# Patient Record
Sex: FEMALE | Race: WHITE | NOT HISPANIC OR LATINO | Employment: OTHER | URBAN - METROPOLITAN AREA
[De-identification: names, ages, dates, MRNs, and addresses within clinical notes are randomized per-mention and may not be internally consistent; named-entity substitution may affect disease eponyms.]

---

## 2017-01-23 ENCOUNTER — HOSPITAL ENCOUNTER (OUTPATIENT)
Dept: MAMMOGRAPHY | Facility: HOSPITAL | Age: 42
Discharge: HOME | End: 2017-01-23
Attending: OBSTETRICS & GYNECOLOGY | Admitting: OBSTETRICS & GYNECOLOGY

## 2017-01-23 DIAGNOSIS — Z12.31 VISIT FOR SCREENING MAMMOGRAM: ICD-10-CM

## 2017-01-23 PROCEDURE — 77063 BREAST TOMOSYNTHESIS BI: CPT

## 2017-01-23 PROCEDURE — 77063 BREAST TOMOSYNTHESIS BI: CPT | Performed by: RADIOLOGY

## 2017-01-23 PROCEDURE — G0202 SCR MAMMO BI INCL CAD: HCPCS

## 2017-01-23 PROCEDURE — 77067 SCR MAMMO BI INCL CAD: CPT | Performed by: RADIOLOGY

## 2017-10-09 ENCOUNTER — OFFICE VISIT (OUTPATIENT)
Dept: INTERNAL MEDICINE | Facility: CLINIC | Age: 42
End: 2017-10-09

## 2017-10-09 VITALS
DIASTOLIC BLOOD PRESSURE: 66 MMHG | BODY MASS INDEX: 19.99 KG/M2 | RESPIRATION RATE: 16 BRPM | HEART RATE: 72 BPM | OXYGEN SATURATION: 97 % | SYSTOLIC BLOOD PRESSURE: 94 MMHG | HEIGHT: 65 IN | WEIGHT: 120 LBS

## 2017-10-09 DIAGNOSIS — M54.41 CHRONIC RIGHT-SIDED LOW BACK PAIN WITH RIGHT-SIDED SCIATICA: ICD-10-CM

## 2017-10-09 DIAGNOSIS — Z00.00 ANNUAL PHYSICAL EXAM: Primary | ICD-10-CM

## 2017-10-09 DIAGNOSIS — J30.89 CHRONIC NON-SEASONAL ALLERGIC RHINITIS, UNSPECIFIED TRIGGER: ICD-10-CM

## 2017-10-09 DIAGNOSIS — N32.81 OAB (OVERACTIVE BLADDER): ICD-10-CM

## 2017-10-09 DIAGNOSIS — G89.29 CHRONIC RIGHT-SIDED LOW BACK PAIN WITH RIGHT-SIDED SCIATICA: ICD-10-CM

## 2017-10-09 LAB
25(OH)D3 SERPL-MCNC: 11.3 NG/ML
ALBUMIN SERPL-MCNC: 4.6 G/DL (ref 3.2–4.8)
ALBUMIN/GLOB SERPL: 1.8 G/DL (ref 1.5–2.5)
ALP SERPL-CCNC: 47 U/L (ref 25–100)
ALT SERPL W P-5'-P-CCNC: 13 U/L (ref 7–40)
ANION GAP SERPL CALCULATED.3IONS-SCNC: 7 MMOL/L (ref 3–11)
ARTICHOKE IGE QN: 79 MG/DL (ref 0–130)
AST SERPL-CCNC: 15 U/L (ref 0–33)
BILIRUB BLD-MCNC: NEGATIVE MG/DL
BILIRUB SERPL-MCNC: 0.7 MG/DL (ref 0.3–1.2)
BUN BLD-MCNC: 16 MG/DL (ref 9–23)
BUN/CREAT SERPL: 20 (ref 7–25)
CALCIUM SPEC-SCNC: 9.5 MG/DL (ref 8.7–10.4)
CHLORIDE SERPL-SCNC: 108 MMOL/L (ref 99–109)
CHOLEST SERPL-MCNC: 164 MG/DL (ref 0–200)
CLARITY, POC: CLEAR
CO2 SERPL-SCNC: 24 MMOL/L (ref 20–31)
COLOR UR: YELLOW
CREAT BLD-MCNC: 0.8 MG/DL (ref 0.6–1.3)
DEPRECATED RDW RBC AUTO: 39.4 FL (ref 37–54)
ERYTHROCYTE [DISTWIDTH] IN BLOOD BY AUTOMATED COUNT: 12.2 % (ref 11.3–14.5)
GFR SERPL CREATININE-BSD FRML MDRD: 78 ML/MIN/1.73
GLOBULIN UR ELPH-MCNC: 2.6 GM/DL
GLUCOSE BLD-MCNC: 83 MG/DL (ref 70–100)
GLUCOSE UR STRIP-MCNC: NEGATIVE MG/DL
HCT VFR BLD AUTO: 43.1 % (ref 34.5–44)
HDLC SERPL-MCNC: 76 MG/DL (ref 40–60)
HGB BLD-MCNC: 14.5 G/DL (ref 11.5–15.5)
KETONES UR QL: NEGATIVE
LEUKOCYTE EST, POC: ABNORMAL
MCH RBC QN AUTO: 30 PG (ref 27–31)
MCHC RBC AUTO-ENTMCNC: 33.6 G/DL (ref 32–36)
MCV RBC AUTO: 89 FL (ref 80–99)
NITRITE UR-MCNC: NEGATIVE MG/ML
PH UR: 5 [PH] (ref 5–8)
PLATELET # BLD AUTO: 228 10*3/MM3 (ref 150–450)
PMV BLD AUTO: 11.7 FL (ref 6–12)
POTASSIUM BLD-SCNC: 4.9 MMOL/L (ref 3.5–5.5)
PROT SERPL-MCNC: 7.2 G/DL (ref 5.7–8.2)
PROT UR STRIP-MCNC: ABNORMAL MG/DL
RBC # BLD AUTO: 4.84 10*6/MM3 (ref 3.89–5.14)
RBC # UR STRIP: ABNORMAL /UL
SODIUM BLD-SCNC: 139 MMOL/L (ref 132–146)
SP GR UR: 1.02 (ref 1–1.03)
TRIGL SERPL-MCNC: 68 MG/DL (ref 0–150)
TSH SERPL DL<=0.05 MIU/L-ACNC: 1.25 MIU/ML (ref 0.35–5.35)
UROBILINOGEN UR QL: ABNORMAL
WBC NRBC COR # BLD: 6.01 10*3/MM3 (ref 3.5–10.8)

## 2017-10-09 PROCEDURE — 82306 VITAMIN D 25 HYDROXY: CPT | Performed by: INTERNAL MEDICINE

## 2017-10-09 PROCEDURE — 90471 IMMUNIZATION ADMIN: CPT | Performed by: INTERNAL MEDICINE

## 2017-10-09 PROCEDURE — 99213 OFFICE O/P EST LOW 20 MIN: CPT | Performed by: INTERNAL MEDICINE

## 2017-10-09 PROCEDURE — 80053 COMPREHEN METABOLIC PANEL: CPT | Performed by: INTERNAL MEDICINE

## 2017-10-09 PROCEDURE — 85027 COMPLETE CBC AUTOMATED: CPT | Performed by: INTERNAL MEDICINE

## 2017-10-09 PROCEDURE — 81003 URINALYSIS AUTO W/O SCOPE: CPT | Performed by: INTERNAL MEDICINE

## 2017-10-09 PROCEDURE — 80061 LIPID PANEL: CPT | Performed by: INTERNAL MEDICINE

## 2017-10-09 PROCEDURE — 84443 ASSAY THYROID STIM HORMONE: CPT | Performed by: INTERNAL MEDICINE

## 2017-10-09 PROCEDURE — 99396 PREV VISIT EST AGE 40-64: CPT | Performed by: INTERNAL MEDICINE

## 2017-10-09 PROCEDURE — 90686 IIV4 VACC NO PRSV 0.5 ML IM: CPT | Performed by: INTERNAL MEDICINE

## 2017-10-09 RX ORDER — MONTELUKAST SODIUM 10 MG/1
TABLET ORAL
Qty: 30 TABLET | Refills: 11 | Status: SHIPPED | OUTPATIENT
Start: 2017-10-09 | End: 2018-02-19

## 2017-10-09 RX ORDER — CYCLOBENZAPRINE HCL 10 MG
10 TABLET ORAL NIGHTLY PRN
Qty: 30 TABLET | Refills: 5 | Status: SHIPPED | OUTPATIENT
Start: 2017-10-09 | End: 2018-11-08 | Stop reason: SDUPTHER

## 2017-10-09 NOTE — PROGRESS NOTES
Chief Complaint   Patient presents with   • Annual Exam       History of Present Illness  HM, Adult Female: The patient is being seen for a health maintenance evaluation.   Social history:  with Children. Occupation: .  General Health: The patient's health is described as good. She has regular dental visits. She denies vision problems. She denies hearing loss. Immunizations status: up to date.   Lifestyle:. She consumes a diverse and healthy diet. She does not have any weight concerns. She exercises regularly.Swims regularly and has had Yoga sessions at work.She does not use tobacco. She denies alcohol use. She denies drug use.   Reproductive health:. She reports normal menses. S/p Tubal.  Screening: Cancer screening reviewed and current.   Metabolic screening reviewed and current.   Risk screening reviewed and current.       Concerned about her HAs worse over the frontal area.  Neck is continuing to hurt, and mid and lower back pain. Helped by flexeril and ibuprofen.  Has seen [employer]now trying some hemp oil, whgich makes her feel better.  Periods regular, having incontinence issues, and given oxybutinin from   Has been on local honey, allergies have been at bay.  Co- Worker       Review of Systems   Constitutional: Negative.  Negative for chills and fever.   HENT: Negative for ear discharge, ear pain, sinus pressure and sore throat.    Respiratory: Negative for cough, chest tightness and shortness of breath.    Cardiovascular: Negative for chest pain, palpitations and leg swelling.   Gastrointestinal: Negative for diarrhea, nausea and vomiting.   Genitourinary: Positive for urgency.   Musculoskeletal: Positive for back pain, myalgias and neck pain. Negative for arthralgias.   Neurological: Positive for headaches. Negative for dizziness and syncope.   Psychiatric/Behavioral: Negative for confusion and sleep disturbance.       Patient Active Problem List   Diagnosis   • IBS (irritable  "bowel syndrome)   • Vitamin D deficiency       Social History     Social History   • Marital status:      Spouse name: N/A   • Number of children: N/A   • Years of education: N/A     Occupational History   • Not on file.     Social History Main Topics   • Smoking status: Never Smoker   • Smokeless tobacco: Not on file   • Alcohol use Yes      Comment: SOCIAL   • Drug use: Not on file   • Sexual activity: Not on file     Other Topics Concern   • Not on file     Social History Narrative       Current Outpatient Prescriptions on File Prior to Visit   Medication Sig Dispense Refill   • vitamin D (ERGOCALCIFEROL) 62946 UNITS capsule capsule Take 50,000 Units by mouth Every 7 (Seven) Days.       No current facility-administered medications on file prior to visit.        Allergies   Allergen Reactions   • Penicillins        BP 94/66 (BP Location: Right arm, Patient Position: Sitting)  Pulse 72 Comment: regular  Resp 16 Comment: normal  Ht 65\" (165.1 cm)  Wt 120 lb (54.4 kg)  SpO2 97% Comment: RA  BMI 19.97 kg/m2           The following portions of the patient's history were reviewed and updated as appropriate: allergies, current medications, past family history, past medical history, past social history, past surgical history and problem list.    Physical Exam   Constitutional: She is oriented to person, place, and time. She appears well-developed and well-nourished.   HENT:   Head: Normocephalic and atraumatic.   Right Ear: External ear normal.   Left Ear: External ear normal.   Mouth/Throat: No oropharyngeal exudate.   Eyes: Conjunctivae are normal. Pupils are equal, round, and reactive to light.   Neck: Neck supple. No thyromegaly present.   Cardiovascular: Normal rate, regular rhythm and intact distal pulses.  Exam reveals no friction rub.    No murmur heard.  Pulmonary/Chest: Effort normal and breath sounds normal. No respiratory distress. She has no wheezes. She has no rales.   Abdominal: Soft. Bowel " sounds are normal. She exhibits no distension. There is no tenderness. There is no rebound and no guarding.   Musculoskeletal: She exhibits tenderness (over upper aspect of neck.). She exhibits no edema.   Lymphadenopathy:     She has no cervical adenopathy.   Neurological: She is alert and oriented to person, place, and time. She displays normal reflexes. No cranial nerve deficit. Coordination normal.   Skin: Skin is warm and dry.   Psychiatric: She has a normal mood and affect. Judgment normal.   A little anxious/ stressed   Nursing note and vitals reviewed.      Results for orders placed or performed in visit on 10/09/17   CBC (No Diff)   Result Value Ref Range    WBC 6.01 3.50 - 10.80 10*3/mm3    RBC 4.84 3.89 - 5.14 10*6/mm3    Hemoglobin 14.5 11.5 - 15.5 g/dL    Hematocrit 43.1 34.5 - 44.0 %    MCV 89.0 80.0 - 99.0 fL    MCH 30.0 27.0 - 31.0 pg    MCHC 33.6 32.0 - 36.0 g/dL    RDW 12.2 11.3 - 14.5 %    RDW-SD 39.4 37.0 - 54.0 fl    MPV 11.7 6.0 - 12.0 fL    Platelets 228 150 - 450 10*3/mm3   Comprehensive Metabolic Panel   Result Value Ref Range    Glucose 83 70 - 100 mg/dL    BUN 16 9 - 23 mg/dL    Creatinine 0.80 0.60 - 1.30 mg/dL    Sodium 139 132 - 146 mmol/L    Potassium 4.9 3.5 - 5.5 mmol/L    Chloride 108 99 - 109 mmol/L    CO2 24.0 20.0 - 31.0 mmol/L    Calcium 9.5 8.7 - 10.4 mg/dL    Total Protein 7.2 5.7 - 8.2 g/dL    Albumin 4.60 3.20 - 4.80 g/dL    ALT (SGPT) 13 7 - 40 U/L    AST (SGOT) 15 0 - 33 U/L    Alkaline Phosphatase 47 25 - 100 U/L    Total Bilirubin 0.7 0.3 - 1.2 mg/dL    eGFR Non African Amer 78 >60 mL/min/1.73    Globulin 2.6 gm/dL    A/G Ratio 1.8 1.5 - 2.5 g/dL    BUN/Creatinine Ratio 20.0 7.0 - 25.0    Anion Gap 7.0 3.0 - 11.0 mmol/L   Lipid Panel   Result Value Ref Range    Total Cholesterol 164 0 - 200 mg/dL    Triglycerides 68 0 - 150 mg/dL    HDL Cholesterol 76 (H) 40 - 60 mg/dL    LDL Cholesterol  79 0 - 130 mg/dL   TSH   Result Value Ref Range    TSH 1.255 0.350 - 5.350 mIU/mL    Vitamin D 25 Hydroxy   Result Value Ref Range    25 Hydroxy, Vitamin D 11.3 ng/ml   POCT urinalysis dipstick, automated   Result Value Ref Range    Color Yellow Yellow, Straw, Dark Yellow, Clarissa    Clarity, UA Clear Clear    Glucose, UA Negative Negative, 1000 mg/dL (3+) mg/dL    Bilirubin Negative Negative    Ketones, UA Negative Negative    Specific Gravity  1.020 1.005 - 1.030    Blood, UA 50 Nick/ul (A) Negative    pH, Urine 5.0 5.0 - 8.0    Protein, POC Trace (A) Negative mg/dL    Urobilinogen, UA 1 E.U./dL  (A) Normal    Leukocytes 25 Evangelista/ul (A) Negative    Nitrite, UA Negative Negative       Celsa was seen today for annual exam.    Diagnoses and all orders for this visit:    Annual physical exam  -     CBC (No Diff)  -     Comprehensive Metabolic Panel  -     Lipid Panel  -     TSH  -     Vitamin D 25 Hydroxy    Chronic right-sided low back pain with right-sided sciatica  -     cyclobenzaprine (FLEXERIL) 10 MG tablet; Take 1 tablet by mouth At Night As Needed for Muscle Spasms.  -     Ambulatory Referral to Physical Therapy Evaluate and treat  -     XR Spine Lumbar AP & Lateral; Future    OAB (overactive bladder)  -     Mirabegron ER (MYRBETRIQ) 25 MG tablet sustained-release 24 hour 24 hr tablet; Take 1 tablet by mouth Daily.  -     Ambulatory Referral to Physical Therapy Evaluate and treat  -     POCT urinalysis dipstick, automated    Chronic non-seasonal allergic rhinitis, unspecified trigger  -     montelukast (SINGULAIR) 10 MG tablet; One PO daily    Other orders  -     Flu Vaccine Quad PF >18YR      Modifier 25: Allergic rhinitis, Neck pain, back pain, and OAB- start meds and PT as above.    Health Maintenance   Topic Date Due   • INFLUENZA VACCINE  08/01/2017   • PAP SMEAR  10/03/2019   • TDAP/TD VACCINES (2 - Td) 10/03/2026       Discussion/Summary  Impression: health maintenance visit. Currently, she eats an adequate diet and has an adequate exercise regimen.   Cervical cancer screening:Pap smear is  current.   Breast cancer screening: mammogram is current.   Colorectal cancer screening: colonoscopy is due at 49.  Osteoporosis screening: Bone mineral density test is not indicated.   Screening lab work includes hemoglobin, glucose, lipid profile, thyroid function testing, 25-hydroxyvitamin D and urinalysis.   Immunizations are needed, immunizations will be given as outlined in the orders     Return in about 1 year (around 10/9/2018) for Annual physical.

## 2017-10-17 ENCOUNTER — HOSPITAL ENCOUNTER (OUTPATIENT)
Dept: GENERAL RADIOLOGY | Facility: HOSPITAL | Age: 42
Discharge: HOME | End: 2017-10-17
Attending: INTERNAL MEDICINE | Admitting: INTERNAL MEDICINE

## 2017-10-17 DIAGNOSIS — G89.29 CHRONIC RIGHT-SIDED LOW BACK PAIN WITH RIGHT-SIDED SCIATICA: ICD-10-CM

## 2017-10-17 DIAGNOSIS — M54.41 CHRONIC RIGHT-SIDED LOW BACK PAIN WITH RIGHT-SIDED SCIATICA: ICD-10-CM

## 2017-10-17 PROCEDURE — 72100 X-RAY EXAM L-S SPINE 2/3 VWS: CPT

## 2017-10-19 ENCOUNTER — TELEPHONE (OUTPATIENT)
Dept: INTERNAL MEDICINE | Facility: CLINIC | Age: 42
End: 2017-10-19

## 2017-10-19 NOTE — TELEPHONE ENCOUNTER
PT CALLED RETURNING A PHONE CALL ABOUT DISCUSSING HER RADIOGRAPH.     PLEASE CONTACT PT WHEN ABLE TO DO SO.     THANKS!

## 2017-10-31 ENCOUNTER — TELEPHONE (OUTPATIENT)
Dept: INTERNAL MEDICINE | Facility: CLINIC | Age: 42
End: 2017-10-31

## 2017-11-01 NOTE — TELEPHONE ENCOUNTER
To be seen, so we can get CT scan to further evaluate and characterize the calcification in her kidney.  OK to overbook with physical if needed.    thanks

## 2017-11-03 ENCOUNTER — OFFICE VISIT (OUTPATIENT)
Dept: INTERNAL MEDICINE | Facility: CLINIC | Age: 42
End: 2017-11-03

## 2017-11-03 VITALS
RESPIRATION RATE: 16 BRPM | OXYGEN SATURATION: 97 % | SYSTOLIC BLOOD PRESSURE: 108 MMHG | DIASTOLIC BLOOD PRESSURE: 62 MMHG | BODY MASS INDEX: 20 KG/M2 | WEIGHT: 120.2 LBS | HEART RATE: 68 BPM

## 2017-11-03 DIAGNOSIS — R31.9 HEMATURIA, UNSPECIFIED TYPE: ICD-10-CM

## 2017-11-03 DIAGNOSIS — N20.0 KIDNEY STONE: Primary | ICD-10-CM

## 2017-11-03 LAB
BILIRUB BLD-MCNC: NEGATIVE MG/DL
CLARITY, POC: CLEAR
COLOR UR: YELLOW
GLUCOSE UR STRIP-MCNC: NEGATIVE MG/DL
KETONES UR QL: NEGATIVE
LEUKOCYTE EST, POC: NEGATIVE
NITRITE UR-MCNC: NEGATIVE MG/ML
PH UR: 6 [PH] (ref 5–8)
PROT UR STRIP-MCNC: NEGATIVE MG/DL
RBC # UR STRIP: ABNORMAL /UL
SP GR UR: 1.02 (ref 1–1.03)
UROBILINOGEN UR QL: NORMAL

## 2017-11-03 PROCEDURE — 81003 URINALYSIS AUTO W/O SCOPE: CPT | Performed by: INTERNAL MEDICINE

## 2017-11-03 PROCEDURE — 99214 OFFICE O/P EST MOD 30 MIN: CPT | Performed by: INTERNAL MEDICINE

## 2017-11-03 NOTE — PROGRESS NOTES
Flank Pain (Pt stated that her x ray showed a 6mm stone in left kidney. )    Subjective   Celsa Velez is a 42 y.o. female is here today for follow-up.    History of Present Illness   Celsa is here with left flank pain, recent abdominal xray showed a 6mm left kidney calcification. Worried if pain is from stone. Last visit UA +ve , negative culture though.    Current Outpatient Prescriptions:   •  cyclobenzaprine (FLEXERIL) 10 MG tablet, Take 1 tablet by mouth At Night As Needed for Muscle Spasms., Disp: 30 tablet, Rfl: 5  •  montelukast (SINGULAIR) 10 MG tablet, One PO daily, Disp: 30 tablet, Rfl: 11  •  vitamin D (ERGOCALCIFEROL) 42747 UNITS capsule capsule, Take 50,000 Units by mouth Every 7 (Seven) Days., Disp: , Rfl:       The following portions of the patient's history were reviewed and updated as appropriate: allergies, current medications, past family history, past medical history, past social history, past surgical history and problem list.    Review of Systems   Constitutional: Negative.  Negative for chills and fever.   HENT: Negative for ear discharge, ear pain, sinus pressure and sore throat.    Respiratory: Negative for cough, chest tightness and shortness of breath.    Cardiovascular: Negative for chest pain, palpitations and leg swelling.   Gastrointestinal: Negative for diarrhea, nausea and vomiting.   Genitourinary: Positive for flank pain (left, better with pressure).   Musculoskeletal: Negative for arthralgias, back pain and myalgias.   Neurological: Negative for dizziness, syncope and headaches.   Psychiatric/Behavioral: Negative for confusion and sleep disturbance.       Objective   /62  Pulse 68  Resp 16  Wt 120 lb 3.2 oz (54.5 kg)  SpO2 97%  BMI 20 kg/m2  Physical Exam   Constitutional: She is oriented to person, place, and time. She appears well-developed and well-nourished.   HENT:   Head: Normocephalic and atraumatic.   Cardiovascular: Normal rate and regular rhythm.     Pulmonary/Chest: Effort normal and breath sounds normal.   Abdominal: Soft. Bowel sounds are normal. There is tenderness (left flank).   Neurological: She is alert and oriented to person, place, and time.         Results for orders placed or performed in visit on 11/03/17   POCT urinalysis dipstick, automated   Result Value Ref Range    Color Yellow Yellow, Straw, Dark Yellow, Clarissa    Clarity, UA Clear Clear    Glucose, UA Negative Negative, 1000 mg/dL (3+) mg/dL    Bilirubin Negative Negative    Ketones, UA Negative Negative    Specific Gravity  1.020 1.005 - 1.030    Blood, UA 50 Nick/ul (A) Negative    pH, Urine 6.0 5.0 - 8.0    Protein, POC Negative Negative mg/dL    Urobilinogen, UA Normal Normal    Leukocytes Negative Negative    Nitrite, UA Negative Negative             Assessment/Plan   Diagnoses and all orders for this visit:    Kidney stone  Comments:  Persistent hematuria  Orders:  -     POCT urinalysis dipstick, automated  -     CT Abdomen Pelvis Stone Protocol  -     Ambulatory Referral to Urology    Hematuria, unspecified type  -     CT Abdomen Pelvis Stone Protocol  -     Ambulatory Referral to Urology      Flank pain with hematuria, proceed with CT scan.           Return if symptoms worsen or fail to improve.

## 2017-11-07 ENCOUNTER — HOSPITAL ENCOUNTER (OUTPATIENT)
Dept: CT IMAGING | Facility: HOSPITAL | Age: 42
Discharge: HOME | End: 2017-11-07
Attending: INTERNAL MEDICINE | Admitting: INTERNAL MEDICINE

## 2017-11-07 PROCEDURE — 74176 CT ABD & PELVIS W/O CONTRAST: CPT

## 2017-11-08 ENCOUNTER — TELEPHONE (OUTPATIENT)
Dept: INTERNAL MEDICINE | Facility: CLINIC | Age: 42
End: 2017-11-08

## 2017-11-08 NOTE — TELEPHONE ENCOUNTER
Please let her know:  There is a 6 mm non obstructing stone in hr left kidney,  We can fax the results to her urologist, but it would be best if she took her CT in a disk to the appointment.  Can call where she got the CT and would download it for her.

## 2017-11-08 NOTE — TELEPHONE ENCOUNTER
WANTS RADIOLOGY RESULTS.  GOING TO A UROLOGIST TOMORROW DEPENDS ON THESE RESULTS. CAN SOMEONE CALL HER TODAY?

## 2018-02-09 ENCOUNTER — TRANSCRIBE ORDERS (OUTPATIENT)
Dept: ADMINISTRATIVE | Facility: HOSPITAL | Age: 43
End: 2018-02-09

## 2018-02-09 DIAGNOSIS — Z12.31 VISIT FOR SCREENING MAMMOGRAM: Primary | ICD-10-CM

## 2018-02-19 ENCOUNTER — OFFICE VISIT (OUTPATIENT)
Dept: INTERNAL MEDICINE | Facility: CLINIC | Age: 43
End: 2018-02-19

## 2018-02-19 VITALS
WEIGHT: 124.13 LBS | RESPIRATION RATE: 18 BRPM | DIASTOLIC BLOOD PRESSURE: 60 MMHG | BODY MASS INDEX: 20.66 KG/M2 | SYSTOLIC BLOOD PRESSURE: 118 MMHG | HEART RATE: 62 BPM

## 2018-02-19 DIAGNOSIS — R31.9 HEMATURIA, UNSPECIFIED TYPE: Primary | ICD-10-CM

## 2018-02-19 LAB
BILIRUB BLD-MCNC: NEGATIVE MG/DL
CLARITY, POC: CLEAR
COLOR UR: YELLOW
GLUCOSE UR STRIP-MCNC: NEGATIVE MG/DL
KETONES UR QL: NEGATIVE
LEUKOCYTE EST, POC: NEGATIVE
NITRITE UR-MCNC: NEGATIVE MG/ML
PH UR: 5 [PH] (ref 5–8)
PROT UR STRIP-MCNC: NEGATIVE MG/DL
RBC # UR STRIP: NEGATIVE /UL
SP GR UR: 1.02 (ref 1–1.03)
UROBILINOGEN UR QL: NORMAL

## 2018-02-19 PROCEDURE — 99213 OFFICE O/P EST LOW 20 MIN: CPT | Performed by: INTERNAL MEDICINE

## 2018-02-19 PROCEDURE — 81003 URINALYSIS AUTO W/O SCOPE: CPT | Performed by: INTERNAL MEDICINE

## 2018-02-19 NOTE — PROGRESS NOTES
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    History of Present Illness   Has seen Dr. Sheffield, who adv. Her to just keep an eye.  Off singulair, and fatigue, and left forehead eczema is better.    Current Outpatient Prescriptions:   •  Cholecalciferol (VITAMIN D3) 5000 units capsule capsule, Take 5,000 Units by mouth Daily., Disp: , Rfl:   •  cyclobenzaprine (FLEXERIL) 10 MG tablet, Take 1 tablet by mouth At Night As Needed for Muscle Spasms., Disp: 30 tablet, Rfl: 5  •  vitamin D (ERGOCALCIFEROL) 92104 UNITS capsule capsule, Take 50,000 Units by mouth Every 7 (Seven) Days., Disp: , Rfl:       The following portions of the patient's history were reviewed and updated as appropriate: allergies, current medications, past family history, past medical history, past social history, past surgical history and problem list.    Review of Systems   Genitourinary: Positive for hematuria (better). Negative for flank pain, frequency, pelvic pain and urgency.       Objective   /60 (BP Location: Left arm, Patient Position: Sitting)  Pulse 62  Resp 18  Wt 56.3 kg (124 lb 2 oz)  BMI 20.66 kg/m2  Physical Exam   Constitutional: She is oriented to person, place, and time. She appears well-developed and well-nourished.   HENT:   Head: Normocephalic and atraumatic.   Mouth/Throat: No oropharyngeal exudate.   Cardiovascular: Normal rate and regular rhythm.    Pulmonary/Chest: Effort normal and breath sounds normal.   Abdominal: Soft. Bowel sounds are normal. She exhibits no distension. There is no tenderness.   Musculoskeletal: She exhibits no edema.   Neurological: She is alert and oriented to person, place, and time.   Psychiatric: She has a normal mood and affect. Judgment normal.   Nursing note and vitals reviewed.        Results for orders placed or performed in visit on 02/19/18   POC Urinalysis Dipstick, Automated   Result Value Ref Range    Color Yellow  Yellow, Straw, Dark Yellow, Clarissa    Clarity, UA Clear Clear    Glucose, UA Negative Negative, 1000 mg/dL (3+) mg/dL    Bilirubin Negative Negative    Ketones, UA Negative Negative    Specific Gravity  1.020 1.005 - 1.030    Blood, UA Negative Negative    pH, Urine 5.0 5.0 - 8.0    Protein, POC Negative Negative mg/dL    Urobilinogen, UA Normal Normal    Leukocytes Negative Negative    Nitrite, UA Negative Negative             Assessment/Plan   Diagnoses and all orders for this visit:    Hematuria, unspecified type  -     POC Urinalysis Dipstick, Automated    Other orders  -     Cholecalciferol (VITAMIN D3) 5000 units capsule capsule; Take 5,000 Units by mouth Daily.      Counseled to continue to monitor her UA at work. Negative today. Hence no need for any further workup unless worse.           Return for Next scheduled follow up.

## 2018-03-12 ENCOUNTER — HOSPITAL ENCOUNTER (OUTPATIENT)
Dept: MAMMOGRAPHY | Facility: HOSPITAL | Age: 43
Discharge: HOME | End: 2018-03-12
Attending: INTERNAL MEDICINE | Admitting: INTERNAL MEDICINE

## 2018-03-12 DIAGNOSIS — Z12.31 VISIT FOR SCREENING MAMMOGRAM: ICD-10-CM

## 2018-03-12 PROCEDURE — 77067 SCR MAMMO BI INCL CAD: CPT | Performed by: RADIOLOGY

## 2018-03-12 PROCEDURE — 77063 BREAST TOMOSYNTHESIS BI: CPT | Performed by: RADIOLOGY

## 2018-03-12 PROCEDURE — 77063 BREAST TOMOSYNTHESIS BI: CPT

## 2018-03-12 PROCEDURE — 77067 SCR MAMMO BI INCL CAD: CPT

## 2018-11-08 ENCOUNTER — OFFICE VISIT (OUTPATIENT)
Dept: INTERNAL MEDICINE | Facility: CLINIC | Age: 43
End: 2018-11-08

## 2018-11-08 VITALS
SYSTOLIC BLOOD PRESSURE: 100 MMHG | OXYGEN SATURATION: 99 % | WEIGHT: 127.6 LBS | DIASTOLIC BLOOD PRESSURE: 70 MMHG | BODY MASS INDEX: 21.26 KG/M2 | HEART RATE: 74 BPM | HEIGHT: 65 IN

## 2018-11-08 DIAGNOSIS — Z00.00 ROUTINE GENERAL MEDICAL EXAMINATION AT A HEALTH CARE FACILITY: Primary | ICD-10-CM

## 2018-11-08 DIAGNOSIS — N32.81 OAB (OVERACTIVE BLADDER): ICD-10-CM

## 2018-11-08 DIAGNOSIS — M54.41 CHRONIC RIGHT-SIDED LOW BACK PAIN WITH RIGHT-SIDED SCIATICA: ICD-10-CM

## 2018-11-08 DIAGNOSIS — G89.29 CHRONIC RIGHT-SIDED LOW BACK PAIN WITH RIGHT-SIDED SCIATICA: ICD-10-CM

## 2018-11-08 LAB
25(OH)D3 SERPL-MCNC: 18.1 NG/ML
ALBUMIN SERPL-MCNC: 4.5 G/DL (ref 3.2–4.8)
ALBUMIN/GLOB SERPL: 2.3 G/DL (ref 1.5–2.5)
ALP SERPL-CCNC: 46 U/L (ref 25–100)
ALT SERPL W P-5'-P-CCNC: 19 U/L (ref 7–40)
ANION GAP SERPL CALCULATED.3IONS-SCNC: 7 MMOL/L (ref 3–11)
ARTICHOKE IGE QN: 73 MG/DL (ref 0–130)
AST SERPL-CCNC: 21 U/L (ref 0–33)
BILIRUB BLD-MCNC: NEGATIVE MG/DL
BILIRUB SERPL-MCNC: 0.6 MG/DL (ref 0.3–1.2)
BUN BLD-MCNC: 15 MG/DL (ref 9–23)
BUN/CREAT SERPL: 21.7 (ref 7–25)
CALCIUM SPEC-SCNC: 9.2 MG/DL (ref 8.7–10.4)
CHLORIDE SERPL-SCNC: 102 MMOL/L (ref 99–109)
CHOLEST SERPL-MCNC: 152 MG/DL (ref 0–200)
CLARITY, POC: CLEAR
CO2 SERPL-SCNC: 28 MMOL/L (ref 20–31)
COLOR UR: YELLOW
CREAT BLD-MCNC: 0.69 MG/DL (ref 0.6–1.3)
DEPRECATED RDW RBC AUTO: 41 FL (ref 37–54)
ERYTHROCYTE [DISTWIDTH] IN BLOOD BY AUTOMATED COUNT: 12.5 % (ref 11.3–14.5)
GFR SERPL CREATININE-BSD FRML MDRD: 92 ML/MIN/1.73
GLOBULIN UR ELPH-MCNC: 2 GM/DL
GLUCOSE BLD-MCNC: 78 MG/DL (ref 70–100)
GLUCOSE UR STRIP-MCNC: NEGATIVE MG/DL
HCT VFR BLD AUTO: 42.6 % (ref 34.5–44)
HDLC SERPL-MCNC: 76 MG/DL (ref 40–60)
HGB BLD-MCNC: 14 G/DL (ref 11.5–15.5)
KETONES UR QL: ABNORMAL
LEUKOCYTE EST, POC: NEGATIVE
MCH RBC QN AUTO: 29.6 PG (ref 27–31)
MCHC RBC AUTO-ENTMCNC: 32.9 G/DL (ref 32–36)
MCV RBC AUTO: 90.1 FL (ref 80–99)
NITRITE UR-MCNC: NEGATIVE MG/ML
PH UR: 8 [PH] (ref 5–8)
PLATELET # BLD AUTO: 256 10*3/MM3 (ref 150–450)
PMV BLD AUTO: 12 FL (ref 6–12)
POTASSIUM BLD-SCNC: 4.6 MMOL/L (ref 3.5–5.5)
PROT SERPL-MCNC: 6.5 G/DL (ref 5.7–8.2)
PROT UR STRIP-MCNC: NEGATIVE MG/DL
RBC # BLD AUTO: 4.73 10*6/MM3 (ref 3.89–5.14)
RBC # UR STRIP: NEGATIVE /UL
SODIUM BLD-SCNC: 137 MMOL/L (ref 132–146)
SP GR UR: 1.01 (ref 1–1.03)
TRIGL SERPL-MCNC: 60 MG/DL (ref 0–150)
TSH SERPL DL<=0.05 MIU/L-ACNC: 0.92 MIU/ML (ref 0.35–5.35)
UROBILINOGEN UR QL: NORMAL
VIT B12 BLD-MCNC: 321 PG/ML (ref 211–911)
WBC NRBC COR # BLD: 8.62 10*3/MM3 (ref 3.5–10.8)

## 2018-11-08 PROCEDURE — 82306 VITAMIN D 25 HYDROXY: CPT | Performed by: INTERNAL MEDICINE

## 2018-11-08 PROCEDURE — 80053 COMPREHEN METABOLIC PANEL: CPT | Performed by: INTERNAL MEDICINE

## 2018-11-08 PROCEDURE — 85027 COMPLETE CBC AUTOMATED: CPT | Performed by: INTERNAL MEDICINE

## 2018-11-08 PROCEDURE — 82607 VITAMIN B-12: CPT | Performed by: INTERNAL MEDICINE

## 2018-11-08 PROCEDURE — 80061 LIPID PANEL: CPT | Performed by: INTERNAL MEDICINE

## 2018-11-08 PROCEDURE — 99396 PREV VISIT EST AGE 40-64: CPT | Performed by: INTERNAL MEDICINE

## 2018-11-08 PROCEDURE — 81003 URINALYSIS AUTO W/O SCOPE: CPT | Performed by: INTERNAL MEDICINE

## 2018-11-08 PROCEDURE — 84443 ASSAY THYROID STIM HORMONE: CPT | Performed by: INTERNAL MEDICINE

## 2018-11-08 RX ORDER — SOLIFENACIN SUCCINATE 5 MG/1
5 TABLET, FILM COATED ORAL DAILY
Qty: 30 TABLET | Refills: 2 | Status: SHIPPED | OUTPATIENT
Start: 2018-11-08 | End: 2019-11-11

## 2018-11-08 RX ORDER — CYCLOBENZAPRINE HCL 10 MG
10 TABLET ORAL NIGHTLY PRN
Qty: 30 TABLET | Refills: 3 | Status: SHIPPED | OUTPATIENT
Start: 2018-11-08 | End: 2019-11-11

## 2018-11-08 RX ORDER — LORATADINE 10 MG/1
1 CAPSULE, LIQUID FILLED ORAL
COMMUNITY
End: 2019-11-11

## 2018-11-08 NOTE — PROGRESS NOTES
Chief Complaint   Patient presents with   • Annual Exam   • Back Pain       History of Present Illness  HM, Adult Female: The patient is being seen for a health maintenance evaluation.   Social history:  with Children. Occupation: .  General Health: The patient's health is described as good. She has regular dental visits. She denies vision problems. She denies hearing loss. Immunizations status: up to date.   Lifestyle:. She consumes a diverse and healthy diet. She does not have any weight concerns. She exercises regularly.Swims regularly and has had Yoga sessions at work.She does not use tobacco. She denies alcohol use. She denies drug use.   Reproductive health:. She reports normal menses. S/p Tubal.  Screening: Cancer screening reviewed and current.   Metabolic screening reviewed and current.   Risk screening reviewed and current.     Back is acting up, rt sided ache, and feels like rt. Leg is draggy.  Also bladder issues are better, sometimes worse.    Review of Systems   Constitutional: Negative for chills, fatigue and fever.   HENT: Negative for ear pain, hearing loss, nosebleeds, postnasal drip, sinus pressure and sore throat.    Eyes: Negative for pain, redness and itching.   Respiratory: Negative for cough, shortness of breath and wheezing.    Cardiovascular: Negative for chest pain, palpitations and leg swelling.   Gastrointestinal: Negative for abdominal distention, abdominal pain, blood in stool, constipation and diarrhea.   Endocrine: Negative for cold intolerance, heat intolerance and polyuria.   Genitourinary: Negative for dysuria, flank pain, hematuria and pelvic pain.   Musculoskeletal: Negative for back pain, joint swelling and myalgias.   Skin: Negative for pallor and wound.   Neurological: Negative for tremors, seizures, weakness, light-headedness and headaches.   Psychiatric/Behavioral: Negative for agitation, confusion, hallucinations and sleep disturbance.       Patient  "Active Problem List   Diagnosis   • IBS (irritable bowel syndrome)   • Vitamin D deficiency   • Hematuria       Social History     Socioeconomic History   • Marital status:      Spouse name: Not on file   • Number of children: Not on file   • Years of education: Not on file   • Highest education level: Not on file   Social Needs   • Financial resource strain: Not on file   • Food insecurity - worry: Not on file   • Food insecurity - inability: Not on file   • Transportation needs - medical: Not on file   • Transportation needs - non-medical: Not on file   Occupational History   • Not on file   Tobacco Use   • Smoking status: Never Smoker   Substance and Sexual Activity   • Alcohol use: Yes     Comment: SOCIAL   • Drug use: Not on file   • Sexual activity: Not on file   Other Topics Concern   • Not on file   Social History Narrative   • Not on file       Current Outpatient Medications on File Prior to Visit   Medication Sig Dispense Refill   • Cholecalciferol (VITAMIN D3) 5000 units capsule capsule Take 5,000 Units by mouth Daily.     • Loratadine (CLARITIN) 10 MG capsule Take 1 tablet by mouth.       No current facility-administered medications on file prior to visit.        Allergies   Allergen Reactions   • Penicillins        /70   Pulse 74   Ht 165.1 cm (65\")   Wt 57.9 kg (127 lb 9.6 oz)   SpO2 99% Comment: ra  BMI 21.23 kg/m²            The following portions of the patient's history were reviewed and updated as appropriate: allergies, current medications, past family history, past medical history, past social history, past surgical history and problem list.    Physical Exam   Constitutional: She is oriented to person, place, and time. She appears well-developed and well-nourished.   HENT:   Head: Normocephalic and atraumatic.   Right Ear: External ear normal.   Left Ear: External ear normal.   Mouth/Throat: No oropharyngeal exudate.   Eyes: Conjunctivae are normal. Pupils are equal, round, and " reactive to light.   Neck: Neck supple. No thyromegaly present.   Cardiovascular: Normal rate, regular rhythm and intact distal pulses. Exam reveals no friction rub.   No murmur heard.  Pulmonary/Chest: Effort normal and breath sounds normal. No stridor. She has no wheezes.   Abdominal: Soft. Bowel sounds are normal. She exhibits no distension and no mass. There is no tenderness. There is no rebound.   Musculoskeletal: She exhibits no edema.   Neurological: She is alert and oriented to person, place, and time. No cranial nerve deficit.   Skin: Skin is warm and dry.   Psychiatric: She has a normal mood and affect. Her behavior is normal. Judgment and thought content normal.   Nursing note and vitals reviewed.      Results for orders placed or performed in visit on 11/08/18   CBC (No Diff)   Result Value Ref Range    WBC 8.62 3.50 - 10.80 10*3/mm3    RBC 4.73 3.89 - 5.14 10*6/mm3    Hemoglobin 14.0 11.5 - 15.5 g/dL    Hematocrit 42.6 34.5 - 44.0 %    MCV 90.1 80.0 - 99.0 fL    MCH 29.6 27.0 - 31.0 pg    MCHC 32.9 32.0 - 36.0 g/dL    RDW 12.5 11.3 - 14.5 %    RDW-SD 41.0 37.0 - 54.0 fl    MPV 12.0 6.0 - 12.0 fL    Platelets 256 150 - 450 10*3/mm3   Comprehensive Metabolic Panel   Result Value Ref Range    Glucose 78 70 - 100 mg/dL    BUN 15 9 - 23 mg/dL    Creatinine 0.69 0.60 - 1.30 mg/dL    Sodium 137 132 - 146 mmol/L    Potassium 4.6 3.5 - 5.5 mmol/L    Chloride 102 99 - 109 mmol/L    CO2 28.0 20.0 - 31.0 mmol/L    Calcium 9.2 8.7 - 10.4 mg/dL    Total Protein 6.5 5.7 - 8.2 g/dL    Albumin 4.50 3.20 - 4.80 g/dL    ALT (SGPT) 19 7 - 40 U/L    AST (SGOT) 21 0 - 33 U/L    Alkaline Phosphatase 46 25 - 100 U/L    Total Bilirubin 0.6 0.3 - 1.2 mg/dL    eGFR Non African Amer 92 >60 mL/min/1.73    Globulin 2.0 gm/dL    A/G Ratio 2.3 1.5 - 2.5 g/dL    BUN/Creatinine Ratio 21.7 7.0 - 25.0    Anion Gap 7.0 3.0 - 11.0 mmol/L   Lipid Panel   Result Value Ref Range    Total Cholesterol 152 0 - 200 mg/dL    Triglycerides 60 0 -  150 mg/dL    HDL Cholesterol 76 (H) 40 - 60 mg/dL    LDL Cholesterol  73 0 - 130 mg/dL   TSH   Result Value Ref Range    TSH 0.920 0.350 - 5.350 mIU/mL   Vitamin D 25 Hydroxy   Result Value Ref Range    25 Hydroxy, Vitamin D 18.1 ng/ml   Vitamin B12   Result Value Ref Range    Vitamin B-12 321 211 - 911 pg/mL   POCT urinalysis dipstick, automated   Result Value Ref Range    Color Yellow Yellow, Straw, Dark Yellow, Clarissa    Clarity, UA Clear Clear    Specific Gravity  1.010 1.005 - 1.030    pH, Urine 8.0 5.0 - 8.0    Leukocytes Negative Negative    Nitrite, UA Negative Negative    Protein, POC Negative Negative mg/dL    Glucose, UA Negative Negative, 1000 mg/dL (3+) mg/dL    Ketones, UA 15 mg/dL (A) Negative    Urobilinogen, UA Normal Normal    Bilirubin Negative Negative    Blood, UA Negative Negative       Celsa was seen today for annual exam and back pain.    Diagnoses and all orders for this visit:    Routine general medical examination at a health care facility  -     POCT urinalysis dipstick, automated  -     CBC (No Diff)  -     Comprehensive Metabolic Panel  -     Lipid Panel  -     TSH  -     Vitamin D 25 Hydroxy  -     Vitamin B12    Chronic right-sided low back pain with right-sided sciatica  -     cyclobenzaprine (FLEXERIL) 10 MG tablet; Take 1 tablet by mouth At Night As Needed for Muscle Spasms.    OAB (overactive bladder)  -     solifenacin (VESICARE) 5 MG tablet; Take 1 tablet by mouth Daily.          Health Maintenance   Topic Date Due   • COLONOSCOPY  11/08/2018   • PAP SMEAR  10/03/2019   • ANNUAL PHYSICAL  11/09/2019   • TDAP/TD VACCINES (2 - Td) 10/03/2026   • INFLUENZA VACCINE  Completed       Discussion/Summary  Impression: health maintenance visit. Currently, she eats an adequate diet and has an adequate exercise regimen.   Cervical cancer screening:Pap smear is current.   Breast cancer screening: mammogram is current.   Colorectal cancer screening: colonoscopy is due at 49.  Osteoporosis  screening: Bone mineral density test is not indicated.   Screening lab work includes hemoglobin, glucose, lipid profile, thyroid function testing, 25-hydroxyvitamin D and urinalysis.   Immunizations are needed, immunizations will be given as outlined in the orders     Return in about 1 year (around 11/8/2019).

## 2018-11-15 ENCOUNTER — TELEPHONE (OUTPATIENT)
Dept: INTERNAL MEDICINE | Facility: CLINIC | Age: 43
End: 2018-11-15

## 2018-11-15 NOTE — TELEPHONE ENCOUNTER
PT CALLED REGARDING HER RECENT LABS; HER VITAMIN D LEVEL WAS LOW AND  INSTRUCTED HER TO TAKE 5,000 UNITS OF VITAMIN D; PT STATED THAT SHE ALREADY TAKES 5,000 UNITS OF VITAMIN D AND HAS BEEN DOING SO FOR THE PAST YEAR; SHE WANT'S TO KNOW IF SHE NEEDS TO DOUBLE THE DOSAGE AND START TAKING 10,000 UNITS; PLEASE CALL PT AND ADVISE [phone]; IF NO ANSWER, PLEASE LEAVE DETAILED MESSAGE

## 2018-11-16 RX ORDER — ERGOCALCIFEROL 1.25 MG/1
50000 CAPSULE ORAL WEEKLY
Qty: 4 CAPSULE | Refills: 5 | Status: SHIPPED | OUTPATIENT
Start: 2018-11-16 | End: 2019-05-23 | Stop reason: SDUPTHER

## 2018-11-16 NOTE — TELEPHONE ENCOUNTER
Please let Pt. Know that I've sent in an Rx for the Vitamin D 50,000 IU weekly for the next 5-6 months.  This should help her levels come up, and after she completes it, she can go back to the 5000 IU daily to help maintain the level.

## 2019-02-08 ENCOUNTER — TRANSCRIBE ORDERS (OUTPATIENT)
Dept: ADMINISTRATIVE | Facility: HOSPITAL | Age: 44
End: 2019-02-08

## 2019-02-08 DIAGNOSIS — Z12.31 VISIT FOR SCREENING MAMMOGRAM: Primary | ICD-10-CM

## 2019-03-25 ENCOUNTER — HOSPITAL ENCOUNTER (OUTPATIENT)
Dept: MAMMOGRAPHY | Facility: HOSPITAL | Age: 44
Discharge: HOME | End: 2019-03-25
Admitting: INTERNAL MEDICINE

## 2019-03-25 DIAGNOSIS — Z12.31 VISIT FOR SCREENING MAMMOGRAM: ICD-10-CM

## 2019-03-25 PROCEDURE — 77067 SCR MAMMO BI INCL CAD: CPT

## 2019-03-25 PROCEDURE — 77063 BREAST TOMOSYNTHESIS BI: CPT

## 2019-03-25 PROCEDURE — 77067 SCR MAMMO BI INCL CAD: CPT | Performed by: RADIOLOGY

## 2019-03-25 PROCEDURE — 77063 BREAST TOMOSYNTHESIS BI: CPT | Performed by: RADIOLOGY

## 2019-05-23 RX ORDER — ERGOCALCIFEROL 1.25 MG/1
CAPSULE ORAL
Qty: 4 CAPSULE | Refills: 4 | Status: SHIPPED | OUTPATIENT
Start: 2019-05-23 | End: 2019-11-11

## 2019-07-08 ENCOUNTER — OFFICE VISIT (OUTPATIENT)
Dept: INTERNAL MEDICINE | Facility: CLINIC | Age: 44
End: 2019-07-08

## 2019-07-08 VITALS
HEART RATE: 66 BPM | DIASTOLIC BLOOD PRESSURE: 78 MMHG | SYSTOLIC BLOOD PRESSURE: 100 MMHG | WEIGHT: 127 LBS | TEMPERATURE: 98 F | BODY MASS INDEX: 21.16 KG/M2 | OXYGEN SATURATION: 99 % | HEIGHT: 65 IN

## 2019-07-08 DIAGNOSIS — R42 DIZZINESS: Primary | ICD-10-CM

## 2019-07-08 DIAGNOSIS — H65.93 MIDDLE EAR EFFUSION, BILATERAL: ICD-10-CM

## 2019-07-08 PROCEDURE — 99213 OFFICE O/P EST LOW 20 MIN: CPT | Performed by: NURSE PRACTITIONER

## 2019-07-08 RX ORDER — METHYLPREDNISOLONE 4 MG/1
TABLET ORAL
Qty: 1 EACH | Refills: 0 | Status: SHIPPED | OUTPATIENT
Start: 2019-07-08 | End: 2019-11-11

## 2019-07-08 RX ORDER — SCOLOPAMINE TRANSDERMAL SYSTEM 1 MG/1
1 PATCH, EXTENDED RELEASE TRANSDERMAL
Qty: 10 EACH | Refills: 0 | Status: SHIPPED | OUTPATIENT
Start: 2019-07-08 | End: 2019-11-11

## 2019-07-08 NOTE — PATIENT INSTRUCTIONS
Recommendations:  Oral antihistamine such as allegra or claritin or zyrtec: one tablet by mouth once daily  Nasal steroid spray such as flonase or Nasacort: 1 spray each nostril daily.

## 2019-07-08 NOTE — PROGRESS NOTES
Chief Complaint   Patient presents with   • Dizziness     with ear ache       History of Present Illness  43 y.o.female presents for dizziness and earache.  Onset sx a little over a week ago; had been on houseboat for weekend. Eyes were burning felt like allergies as first; took xyzal. Had some dizziness thought was due to boat.  At home still with some dizziness; worse when gets out of bed or if bends over.  Ears feel congested mild pain fullness. No fever or tinnitus. Mild nausea; no vomiting.  Tried dramamine otc sleepy.   No chest pain, palpitations, vision changes, headaches, numbness or tingling. No fever or chills.  She is going to be flying in 1 week and out of town 10 days.  Will be doing a lot more boating and water activities; needs to get resolved quickly.    Review of Systems   Constitutional: Negative for chills, fatigue and fever.   HENT: Positive for ear pain. Negative for congestion, ear discharge, hearing loss, postnasal drip, rhinorrhea, sinus pressure, sneezing, sore throat, tinnitus and trouble swallowing.    Eyes: Negative for blurred vision and visual disturbance.   Respiratory: Negative for cough and shortness of breath.    Cardiovascular: Negative for chest pain and palpitations.   Gastrointestinal: Positive for nausea. Negative for abdominal pain, anal bleeding, blood in stool, constipation and vomiting.   Genitourinary: Negative for difficulty urinating and dysuria.   Musculoskeletal: Negative for arthralgias and myalgias.   Skin: Negative for rash.   Neurological: Positive for dizziness. Negative for syncope, weakness, numbness and headache.         [facility]  The following portions of the patient's history were reviewed and updated as appropriate: allergies, current medications, past family history, past medical history, past social history, past surgical history and problem list.       Past Medical History:   Diagnosis Date   • Acute sinusitis    • Arthritis    • Seizure disorder (CMS/[facility])   "  • UTI (urinary tract infection)     Last Impression: 02 Nov 2014  Send urine for culture. Treat with cipro until results are  available.  Almaz Cavazos (Internal Medicine)      Past Surgical History:   Procedure Laterality Date   • BREAST BIOPSY Right 2014    us bx   • DILATION AND CURETTAGE, DIAGNOSTIC / THERAPEUTIC      Dilation And Curettage Of Cervical Stump   • KNEE SURGERY        Allergies   Allergen Reactions   • Penicillins       Family History   Problem Relation Age of Onset   • Colon cancer Father    • Arthritis Other    • Cancer Other    • Migraines Other    • Breast cancer Mother 68   • Breast cancer Paternal Grandmother 78   • Ovarian cancer Neg Hx             Current Outpatient Medications:   •  Cholecalciferol (VITAMIN D3) 5000 units capsule capsule, Take 1 capsule by mouth Daily., Disp: 30 capsule, Rfl: 5  •  cyclobenzaprine (FLEXERIL) 10 MG tablet, Take 1 tablet by mouth At Night As Needed for Muscle Spasms., Disp: 30 tablet, Rfl: 3  •  Loratadine (CLARITIN) 10 MG capsule, Take 1 tablet by mouth., Disp: , Rfl:   •  solifenacin (VESICARE) 5 MG tablet, Take 1 tablet by mouth Daily., Disp: 30 tablet, Rfl: 2  •  vitamin D (ERGOCALCIFEROL) 39908 units capsule capsule, TAKE ONE CAPSULE BY MOUTH ONCE WEEKLY, Disp: 4 capsule, Rfl: 4    VITALS:  /78   Pulse 66   Temp 98 °F (36.7 °C)   Ht 165.1 cm (65\")   Wt 57.6 kg (127 lb)   SpO2 99%   BMI 21.13 kg/m²     Physical Exam   Constitutional: She is oriented to person, place, and time. She appears well-developed and well-nourished. No distress.   HENT:   Head: Normocephalic.   Right Ear: External ear and ear canal normal. Tympanic membrane is not perforated, not erythematous and not bulging. A middle ear effusion is present.   Left Ear: External ear and ear canal normal. Tympanic membrane is not perforated, not erythematous and not bulging. A middle ear effusion is present.   Nose: Nose normal.   Mouth/Throat: Oropharynx is clear and moist and " mucous membranes are normal.   Eyes: Conjunctivae, EOM and lids are normal. Pupils are equal, round, and reactive to light.   Neck: Trachea normal and normal range of motion. Neck supple.   Cardiovascular: Normal rate, regular rhythm, normal heart sounds and intact distal pulses.   No edema   Pulmonary/Chest: Effort normal and breath sounds normal. No respiratory distress.   Abdominal: Soft. Bowel sounds are normal. There is no tenderness. There is no rigidity and no guarding.   Musculoskeletal: Normal range of motion.   Normal ROM all major joints   Lymphadenopathy:     She has no cervical adenopathy.   Neurological: She is alert and oriented to person, place, and time. She has normal strength. No cranial nerve deficit or sensory deficit. Coordination and gait normal.   Reflex Scores:       Bicep reflexes are 2+ on the right side and 2+ on the left side.       Brachioradialis reflexes are 2+ on the right side and 2+ on the left side.  Skin: Skin is warm and dry. Capillary refill takes less than 2 seconds. No rash noted.   Psychiatric: She has a normal mood and affect. Her behavior is normal.       LABS  No new labs    ASSESSMENT/PLAN  Celsa was seen today for dizziness.    Diagnoses and all orders for this visit:    Dizziness  -     methylPREDNISolone (MEDROL, TANO,) 4 MG tablet; Take as directed on package instructions.  -     Scopolamine (TRANSDERM-SCOP) 1.5 MG/3DAYS patch; Place 1 patch on the skin as directed by provider Every 72 (Seventy-Two) Hours.    Middle ear effusion, bilateral  Comments:  add nasal steroid spray such as flonase or nasacort 1 spray each nostril daily.  Orders:  -     methylPREDNISolone (MEDROL, TANO,) 4 MG tablet; Take as directed on package instructions.    she is going to first try the steroid tano and steroid nasal spray. If no improvement, she is going to start the scopolamine patch to help with dizziness and flying. Hoping will not make her as tired as the dramamine.  Encouraged stay  hydrated  Avoid alcohol with scopolamine and/or antihistamines as can increase drowsiness.  Pt verbalized understanding.  If worsening of symptoms or no improvement in symptoms or fever > 101.5 patient should contact our office for further evaluation treatment or seek emergency care.    I discussed the patients findings and my recommendations with patient.  Patient was encouraged to keep me informed of any acute changes, lack of improvement, or any new concerning symptoms.    Patient voiced understanding of all instructions and denied further questions.      FOLLOW-UP  Return if symptoms worsen or fail to improve.    Electronically signed by:    DONOVAN White  07/08/2019

## 2019-08-12 ENCOUNTER — TELEPHONE (OUTPATIENT)
Dept: INTERNAL MEDICINE | Facility: CLINIC | Age: 44
End: 2019-08-12

## 2019-08-12 ENCOUNTER — CLINICAL SUPPORT (OUTPATIENT)
Dept: INTERNAL MEDICINE | Facility: CLINIC | Age: 44
End: 2019-08-12

## 2019-08-12 DIAGNOSIS — Z23 NEED FOR HEPATITIS A VACCINATION: ICD-10-CM

## 2019-08-12 PROCEDURE — 90471 IMMUNIZATION ADMIN: CPT | Performed by: INTERNAL MEDICINE

## 2019-08-12 PROCEDURE — 90632 HEPA VACCINE ADULT IM: CPT | Performed by: INTERNAL MEDICINE

## 2019-08-12 NOTE — TELEPHONE ENCOUNTER
PATIENT CALLED WITH QUESTIONS ABOUT HER HEP A THAT SHE THOUGHT SHE HAD DONE AT HER [facility] BUT THEN SHE MIGHT OF THOUGHT SHE CAME IN OUR OFFICE AND HAD THE 6 MONTH BOOSTER DONE HERE? THE PATIENT WOULD LIKE TO GET  A CALL BACK -[phone]

## 2019-08-12 NOTE — TELEPHONE ENCOUNTER
Pt advised that she had her first Hep A 11/1/18 at her pharmacy, states she has not had her 2nd.  Advised she can stop in and have here, states verbal understanding.

## 2019-11-11 ENCOUNTER — OFFICE VISIT (OUTPATIENT)
Dept: INTERNAL MEDICINE | Facility: CLINIC | Age: 44
End: 2019-11-11

## 2019-11-11 VITALS
HEART RATE: 71 BPM | DIASTOLIC BLOOD PRESSURE: 68 MMHG | BODY MASS INDEX: 21.52 KG/M2 | OXYGEN SATURATION: 100 % | WEIGHT: 129.2 LBS | SYSTOLIC BLOOD PRESSURE: 96 MMHG | HEIGHT: 65 IN

## 2019-11-11 DIAGNOSIS — E55.9 VITAMIN D DEFICIENCY: ICD-10-CM

## 2019-11-11 DIAGNOSIS — Z00.00 ROUTINE GENERAL MEDICAL EXAMINATION AT A HEALTH CARE FACILITY: Primary | ICD-10-CM

## 2019-11-11 DIAGNOSIS — Z80.0 FAMILY HISTORY OF COLON CANCER: ICD-10-CM

## 2019-11-11 DIAGNOSIS — M54.2 CERVICALGIA: ICD-10-CM

## 2019-11-11 DIAGNOSIS — M54.16 LUMBAR RADICULOPATHY: ICD-10-CM

## 2019-11-11 DIAGNOSIS — Z12.11 SCREENING FOR COLON CANCER: ICD-10-CM

## 2019-11-11 PROBLEM — Z80.3 FAMILY HISTORY OF BREAST CANCER IN MOTHER: Status: ACTIVE | Noted: 2019-11-11

## 2019-11-11 LAB
BILIRUB BLD-MCNC: NEGATIVE MG/DL
CLARITY, POC: CLEAR
COLOR UR: YELLOW
GLUCOSE UR STRIP-MCNC: NEGATIVE MG/DL
KETONES UR QL: NEGATIVE
LEUKOCYTE EST, POC: NEGATIVE
NITRITE UR-MCNC: NEGATIVE MG/ML
PH UR: 5 [PH] (ref 5–8)
PROT UR STRIP-MCNC: NEGATIVE MG/DL
RBC # UR STRIP: ABNORMAL /UL
SP GR UR: 1.01 (ref 1–1.03)
UROBILINOGEN UR QL: NORMAL

## 2019-11-11 PROCEDURE — 99213 OFFICE O/P EST LOW 20 MIN: CPT | Performed by: INTERNAL MEDICINE

## 2019-11-11 PROCEDURE — 99396 PREV VISIT EST AGE 40-64: CPT | Performed by: INTERNAL MEDICINE

## 2019-11-11 PROCEDURE — 81003 URINALYSIS AUTO W/O SCOPE: CPT | Performed by: INTERNAL MEDICINE

## 2019-11-11 RX ORDER — ERGOCALCIFEROL 1.25 MG/1
50000 CAPSULE ORAL WEEKLY
Qty: 5 CAPSULE | Refills: 3 | Status: SHIPPED | OUTPATIENT
Start: 2019-11-11 | End: 2020-10-08

## 2019-11-11 NOTE — PROGRESS NOTES
Chief Complaint   Patient presents with   • Annual Exam   • Back Pain     with leg fatigue has been going to PT   • Neck Pain     with headaches       History of Present Illness  HM, Adult Female: The patient is being seen for a health maintenance evaluation.   Social history:  to David with Children. Occupation: .  General Health: The patient's health is described as good. She has regular dental visits. She denies vision problems. She denies hearing loss. Immunizations status: up to date.   Lifestyle:. She consumes a diverse and healthy diet. She does not have any weight concerns. She exercises regularly.Swims regularly and has had Yoga sessions at work.She does not use tobacco. She denies alcohol use. She denies drug use.   Reproductive health:. She reports normal menses. S/p Tubal.  Screening: Cancer screening reviewed and current.   Metabolic screening reviewed and current.   Risk screening reviewed and current.      still having back issues, tried PT, now doing it prn with cash pay. Doing them regularly at home.  Flares up on occasion, especially when she lifts. Left > rt LE TIRED with flares.    Rt SIDED ha, AFTER SOME STRAIN.  Starts in her neck and gors to the back of her head.    Review of Systems   Constitutional: Negative.  Negative for chills and fever.   HENT: Negative for ear discharge, ear pain, sinus pressure and sore throat.    Respiratory: Negative for cough, chest tightness and shortness of breath.    Cardiovascular: Negative for chest pain, palpitations and leg swelling.   Gastrointestinal: Negative for diarrhea, nausea and vomiting.   Musculoskeletal: Positive for arthralgias, back pain, myalgias, neck pain and neck stiffness.   Neurological: Negative for dizziness, syncope and headaches.   Psychiatric/Behavioral: Negative for confusion and sleep disturbance.       Patient Active Problem List   Diagnosis   • IBS (irritable bowel syndrome)   • Vitamin D deficiency   • Hematuria  "  • Family history of breast cancer in mother       Social History     Socioeconomic History   • Marital status:      Spouse name: Not on file   • Number of children: Not on file   • Years of education: Not on file   • Highest education level: Not on file   Tobacco Use   • Smoking status: Never Smoker   • Smokeless tobacco: Never Used   Substance and Sexual Activity   • Alcohol use: Yes     Comment: SOCIAL   • Drug use: No   • Sexual activity: Yes     Partners: Male       Current Outpatient Medications on File Prior to Visit   Medication Sig Dispense Refill   • Levocetirizine Dihydrochloride (XYZAL PO) Take  by mouth.       No current facility-administered medications on file prior to visit.        Allergies   Allergen Reactions   • Penicillins        BP 96/68   Pulse 71   Ht 165.1 cm (65\")   Wt 58.6 kg (129 lb 3.2 oz)   LMP 11/08/2019 (Approximate)   SpO2 100% Comment: ra  Breastfeeding? No   BMI 21.50 kg/m²            The following portions of the patient's history were reviewed and updated as appropriate: allergies, current medications, past family history, past medical history, past social history, past surgical history and problem list.    Physical Exam   Constitutional: She is oriented to person, place, and time. She appears well-developed and well-nourished.   HENT:   Head: Normocephalic and atraumatic.   Right Ear: External ear normal.   Left Ear: External ear normal.   Mouth/Throat: No oropharyngeal exudate.   Eyes: Conjunctivae are normal. Pupils are equal, round, and reactive to light.   Neck: Neck supple. No thyromegaly present.   Cardiovascular: Normal rate, regular rhythm and intact distal pulses.   Pulmonary/Chest: Effort normal and breath sounds normal.   Abdominal: Soft. Bowel sounds are normal. She exhibits no distension. There is no tenderness.   Musculoskeletal: She exhibits tenderness (l4- l5 and rt neck , paraspinal area.). She exhibits no edema.   Neurological: She is alert and " oriented to person, place, and time. No cranial nerve deficit.   Skin: Skin is warm and dry.   Psychiatric: She has a normal mood and affect. Judgment normal.   Nursing note and vitals reviewed.      Results for orders placed or performed in visit on 11/11/19   CBC (No Diff)   Result Value Ref Range    WBC 5.53 3.40 - 10.80 10*3/mm3    RBC 4.10 3.77 - 5.28 10*6/mm3    Hemoglobin 12.4 12.0 - 15.9 g/dL    Hematocrit 37.6 34.0 - 46.6 %    MCV 91.7 79.0 - 97.0 fL    MCH 30.2 26.6 - 33.0 pg    MCHC 33.0 31.5 - 35.7 g/dL    RDW 12.0 (L) 12.3 - 15.4 %    Platelets 248 140 - 450 10*3/mm3   Comprehensive Metabolic Panel   Result Value Ref Range    Glucose 87 65 - 99 mg/dL    BUN 9 6 - 20 mg/dL    Creatinine 0.74 0.57 - 1.00 mg/dL    eGFR Non African Am 85 >60 mL/min/1.73    eGFR African Am 103 >60 mL/min/1.73    BUN/Creatinine Ratio 12.2 7.0 - 25.0    Sodium 139 136 - 145 mmol/L    Potassium 5.2 3.5 - 5.2 mmol/L    Chloride 103 98 - 107 mmol/L    Total CO2 25.4 22.0 - 29.0 mmol/L    Calcium 9.0 8.6 - 10.5 mg/dL    Total Protein 6.7 6.0 - 8.5 g/dL    Albumin 4.70 3.50 - 5.20 g/dL    Globulin 2.0 gm/dL    A/G Ratio 2.4 g/dL    Total Bilirubin 0.2 0.2 - 1.2 mg/dL    Alkaline Phosphatase 47 39 - 117 U/L    AST (SGOT) 13 1 - 32 U/L    ALT (SGPT) 9 1 - 33 U/L   Lipid Panel   Result Value Ref Range    Total Cholesterol 156 0 - 200 mg/dL    Triglycerides 83 0 - 150 mg/dL    HDL Cholesterol 72 (H) 40 - 60 mg/dL    VLDL Cholesterol 16.6 mg/dL    LDL Cholesterol  67 0 - 100 mg/dL   TSH   Result Value Ref Range    TSH 0.996 0.270 - 4.200 uIU/mL   Vitamin D 25 Hydroxy   Result Value Ref Range    25 Hydroxy, Vitamin D 25.0 (L) 30.0 - 100.0 ng/ml   POCT urinalysis dipstick, automated   Result Value Ref Range    Color Yellow Yellow, Straw, Dark Yellow, Clarissa    Clarity, UA Clear Clear    Specific Gravity  1.015 1.005 - 1.030    pH, Urine 5.0 5.0 - 8.0    Leukocytes Negative Negative    Nitrite, UA Negative Negative    Protein, POC Negative  Negative mg/dL    Glucose, UA Negative Negative, 1000 mg/dL (3+) mg/dL    Ketones, UA Negative Negative    Urobilinogen, UA Normal Normal    Bilirubin Negative Negative    Blood,  Nick/ul (A) Negative       Celsa was seen today for annual exam, back pain and neck pain.    Diagnoses and all orders for this visit:    Routine general medical examination at a health care facility  -     POCT urinalysis dipstick, automated  -     CBC (No Diff)  -     Comprehensive Metabolic Panel  -     Lipid Panel  -     TSH  -     Vitamin D 25 Hydroxy    Lumbar radiculopathy  -     MRI Lumbar Spine Without Contrast; Future    Cervicalgia  -     XR Spine Cervical 2 or 3 View  -     Ambulatory Referral to Physical Therapy Evaluate and treat    Screening for colon cancer  -     Ambulatory Referral For Screening Colonoscopy    Family history of colon cancer  -     Ambulatory Referral For Screening Colonoscopy    Vitamin D deficiency  -     vitamin D (ERGOCALCIFEROL) 1.25 MG (71548 UT) capsule capsule; Take 1 capsule by mouth 1 (One) Time Per Week.  -     Vitamin D 25 Hydroxy          Health Maintenance   Topic Date Due   • COLONOSCOPY  01/24/2019   • ANNUAL PHYSICAL  11/12/2020   • PAP SMEAR  10/14/2022   • TDAP/TD VACCINES (2 - Td) 10/03/2026   • INFLUENZA VACCINE  Completed       Discussion/Summary  Impression: health maintenance visit. Currently, she eats an adequate diet and has an adequate exercise regimen.   Cervical cancer screening:Pap smear is current. DUE NEXT YEAR, WILL HAVE IT doneHERE.  Breast cancer screening: mammogram is current.   Colorectal cancer screening: colonoscopy is utd.  Osteoporosis screening: Bone mineral density test is not indicated.   Screening lab work includes hemoglobin, glucose, lipid profile, thyroid function testing, 25-hydroxyvitamin D and urinalysis.   Immunizations are needed, immunizations will be given as outlined in the orders   Advice and education were given regarding cardiovascular risk  reduction, healthy dietary habits, Seatbelt and helmet use and self skin examination.     Return in about 1 year (around 11/11/2020) for Annual WITH PAP.

## 2019-11-12 LAB
25(OH)D3+25(OH)D2 SERPL-MCNC: 25 NG/ML (ref 30–100)
ALBUMIN SERPL-MCNC: 4.7 G/DL (ref 3.5–5.2)
ALBUMIN/GLOB SERPL: 2.4 G/DL
ALP SERPL-CCNC: 47 U/L (ref 39–117)
ALT SERPL-CCNC: 9 U/L (ref 1–33)
AST SERPL-CCNC: 13 U/L (ref 1–32)
BILIRUB SERPL-MCNC: 0.2 MG/DL (ref 0.2–1.2)
BUN SERPL-MCNC: 9 MG/DL (ref 6–20)
BUN/CREAT SERPL: 12.2 (ref 7–25)
CALCIUM SERPL-MCNC: 9 MG/DL (ref 8.6–10.5)
CHLORIDE SERPL-SCNC: 103 MMOL/L (ref 98–107)
CHOLEST SERPL-MCNC: 156 MG/DL (ref 0–200)
CO2 SERPL-SCNC: 25.4 MMOL/L (ref 22–29)
CREAT SERPL-MCNC: 0.74 MG/DL (ref 0.57–1)
ERYTHROCYTE [DISTWIDTH] IN BLOOD BY AUTOMATED COUNT: 12 % (ref 12.3–15.4)
GLOBULIN SER CALC-MCNC: 2 GM/DL
GLUCOSE SERPL-MCNC: 87 MG/DL (ref 65–99)
HCT VFR BLD AUTO: 37.6 % (ref 34–46.6)
HDLC SERPL-MCNC: 72 MG/DL (ref 40–60)
HGB BLD-MCNC: 12.4 G/DL (ref 12–15.9)
LDLC SERPL CALC-MCNC: 67 MG/DL (ref 0–100)
MCH RBC QN AUTO: 30.2 PG (ref 26.6–33)
MCHC RBC AUTO-ENTMCNC: 33 G/DL (ref 31.5–35.7)
MCV RBC AUTO: 91.7 FL (ref 79–97)
PLATELET # BLD AUTO: 248 10*3/MM3 (ref 140–450)
POTASSIUM SERPL-SCNC: 5.2 MMOL/L (ref 3.5–5.2)
PROT SERPL-MCNC: 6.7 G/DL (ref 6–8.5)
RBC # BLD AUTO: 4.1 10*6/MM3 (ref 3.77–5.28)
SODIUM SERPL-SCNC: 139 MMOL/L (ref 136–145)
TRIGL SERPL-MCNC: 83 MG/DL (ref 0–150)
TSH SERPL DL<=0.005 MIU/L-ACNC: 1 UIU/ML (ref 0.27–4.2)
VLDLC SERPL CALC-MCNC: 16.6 MG/DL
WBC # BLD AUTO: 5.53 10*3/MM3 (ref 3.4–10.8)

## 2019-11-18 ENCOUNTER — HOSPITAL ENCOUNTER (OUTPATIENT)
Dept: GENERAL RADIOLOGY | Facility: HOSPITAL | Age: 44
Discharge: HOME | End: 2019-11-18
Admitting: INTERNAL MEDICINE

## 2019-11-18 PROCEDURE — 72040 X-RAY EXAM NECK SPINE 2-3 VW: CPT

## 2020-01-23 ENCOUNTER — HOSPITAL ENCOUNTER (OUTPATIENT)
Dept: MRI IMAGING | Facility: HOSPITAL | Age: 45
Discharge: HOME | End: 2020-01-23
Admitting: INTERNAL MEDICINE

## 2020-01-23 DIAGNOSIS — M54.16 LUMBAR RADICULOPATHY: ICD-10-CM

## 2020-01-23 PROCEDURE — 72148 MRI LUMBAR SPINE W/O DYE: CPT

## 2020-02-03 ENCOUNTER — TELEPHONE (OUTPATIENT)
Dept: INTERNAL MEDICINE | Facility: CLINIC | Age: 45
End: 2020-02-03

## 2020-02-03 NOTE — TELEPHONE ENCOUNTER
Pt advised of result note.    Pt states that her ins co will not pay for cscope at 5 years and it will cost her $1,200 out of pocket, wanting to know if you think it is necessary for her to have?

## 2020-02-04 NOTE — TELEPHONE ENCOUNTER
Please let her know its not necessary. Only if insurance were to cover it, she should proceed.  Otw, can wait until 48 yo.    thanks

## 2020-03-05 ENCOUNTER — OFFICE VISIT (OUTPATIENT)
Dept: NEUROSURGERY | Facility: CLINIC | Age: 45
End: 2020-03-05

## 2020-03-05 VITALS
HEIGHT: 65 IN | BODY MASS INDEX: 21.43 KG/M2 | SYSTOLIC BLOOD PRESSURE: 86 MMHG | WEIGHT: 128.6 LBS | DIASTOLIC BLOOD PRESSURE: 62 MMHG | TEMPERATURE: 97.9 F

## 2020-03-05 DIAGNOSIS — M54.9 MECHANICAL BACK PAIN: Primary | ICD-10-CM

## 2020-03-05 DIAGNOSIS — M51.36 DDD (DEGENERATIVE DISC DISEASE), LUMBAR: ICD-10-CM

## 2020-03-05 PROCEDURE — 99204 OFFICE O/P NEW MOD 45 MIN: CPT | Performed by: NEUROLOGICAL SURGERY

## 2020-03-05 RX ORDER — DULOXETIN HYDROCHLORIDE 30 MG/1
30 CAPSULE, DELAYED RELEASE ORAL DAILY
Qty: 60 CAPSULE | Refills: 1 | Status: SHIPPED | OUTPATIENT
Start: 2020-03-05 | End: 2020-11-16

## 2020-03-05 NOTE — PROGRESS NOTES
NAME: ANNABEL LE DR.   DOS: 3/5/2020  : 1975  PCP: Yecenia Giang MD    Chief Complaint:    Chief Complaint   Patient presents with   • Back Pain       History of Present Illness:  44 y.o. 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 she has a longstanding history and of some cervical and lumbar spinal issues    Her neck pain is chronic in nature she occasionally has suboccipital and migraine types of symptoms she occasionally has paraspinal tenderness but strictly denies any symptoms of myelopathy clumsiness of the hands are cervical radiculopathy is alleviated with some nonsteroidal anti-inflammatories and a occasional yoga    The reason for her visit is lumbar spinal pain she reports a history of paraspinal pain she denies any radiation down the legs she occasionally has top of sacral pain but denies any genitourinary symptoms other than pelvic floor laxity which she is been treated for she had some incontinence issues but no evidence of cauda equina syndrome she denies any neurogenic claudication she has alleviation occasionally with rest she has flareups periodically and takes nonsteroidal anti-inflammatories she is here for evaluation she is very active works as a  and has done yoga    She has an interesting history of potentially Sienna juvenile rheumatoid arthritis but has not been worked up for that recently    PMHX  Allergies:  Allergies   Allergen Reactions   • Penicillins      Medications    Current Outpatient Medications:   •  vitamin D (ERGOCALCIFEROL) 1.25 MG (02515 UT) capsule capsule, Take 1 capsule by mouth 1 (One) Time Per Week., Disp: 5 capsule, Rfl: 3  •  Levocetirizine Dihydrochloride (XYZAL PO), Take  by mouth., Disp: , Rfl:   Past Medical History:  Past Medical History:   Diagnosis Date   • Acute sinusitis    • Allergic    • Arthritis    • JRA (juvenile rheumatoid arthritis) (CMS/[facility])    • Seizure disorder (CMS/[facility])    • UTI  (urinary tract infection)     Last Impression: 02 Nov 2014  Send urine for culture. Treat with cipro until results are  available.  Almaz Cavazos (Internal Medicine)     Past Surgical History:  Past Surgical History:   Procedure Laterality Date   • BREAST BIOPSY Right 2014    us bx   • DILATION AND CURETTAGE, DIAGNOSTIC / THERAPEUTIC      Dilation And Curettage Of Cervical Stump   • KNEE SURGERY       Social Hx:  Social History     Tobacco Use   • Smoking status: Never Smoker   • Smokeless tobacco: Never Used   Substance Use Topics   • Alcohol use: Yes     Comment: SOCIAL   • Drug use: No     Family Hx:  Family History   Problem Relation Age of Onset   • Colon cancer Father    • Arthritis Other    • Cancer Other    • Migraines Other    • Breast cancer Mother 68   • Breast cancer Paternal Grandmother 78   • Ovarian cancer Neg Hx      Review of Systems:        Review of Systems   Constitutional: Negative for activity change, appetite change, chills, diaphoresis, fatigue, fever and unexpected weight change.   HENT: Negative for congestion, dental problem, drooling, ear discharge, ear pain, facial swelling, hearing loss, mouth sores, nosebleeds, postnasal drip, rhinorrhea, sinus pressure, sneezing, sore throat, tinnitus, trouble swallowing and voice change.    Eyes: Negative for photophobia, pain, discharge, redness, itching and visual disturbance.   Respiratory: Negative for apnea, cough, choking, chest tightness, shortness of breath, wheezing and stridor.    Cardiovascular: Negative for chest pain, palpitations and leg swelling.   Gastrointestinal: Negative for abdominal distention, abdominal pain, anal bleeding, blood in stool, constipation, diarrhea, nausea, rectal pain and vomiting.   Endocrine: Negative for cold intolerance, heat intolerance, polydipsia, polyphagia and polyuria.   Genitourinary: Negative for decreased urine volume, difficulty urinating, dysuria, enuresis, flank pain, frequency, genital sores,  hematuria and urgency.   Musculoskeletal: Positive for back pain and neck stiffness. Negative for arthralgias, gait problem, joint swelling, myalgias and neck pain.   Skin: Negative for color change, pallor, rash and wound.   Allergic/Immunologic: Negative for environmental allergies, food allergies and immunocompromised state.   Neurological: Negative for dizziness, tremors, seizures, syncope, facial asymmetry, speech difficulty, weakness, light-headedness, numbness and headaches.   Hematological: Negative for adenopathy. Does not bruise/bleed easily.   Psychiatric/Behavioral: Negative for agitation, behavioral problems, confusion, decreased concentration, dysphoric mood, hallucinations, self-injury, sleep disturbance and suicidal ideas. The patient is not nervous/anxious and is not hyperactive.    All other systems reviewed and are negative.     I have reviewed this note template and all pertinent parts of the review of systems social, family history, surgical history and medication list    Physical Examination:  Vitals:    03/05/20 1057   BP: (!) 86/62   Temp: 97.9 °F (36.6 °C)      General Appearance:   Well developed, well nourished, well groomed, alert, and cooperative.  Neurological examination:  Neurologic Exam  Vital signs were reviewed and documented in the chart  Patient appeared in good neurologic function with normal comprehension fluent speech  Mood and affect are normal  Sense of smell deferred    Pupils symmetric equally reactive funduscopic exam not visualized   Visual fields intact to confrontation  Extraocular movements intact  Face motor function is symmetric  Facial sensations normal  Hearing intact to finger rub hearing intact to finger rub  Tongue is midline  Palate symmetric  Swallowing normal  Shoulder shrug normal  Neck is supple range of motion normal no evidence of intrinsic shoulder dysfunction muscle bulk and tone normal  5 out of 5 strength no motor drift  Gait normal intact  Negative  Romberg  No clonus long tract signs or myelopathy    Reflexes symmetric  No edema noted and extremities skin appears normal    Straight leg raise sign absent  No signs of intrinsic hip dysfunction  Back is without any lesions or abnormality  Feet are warm and well perfused        Review of Imaging/DATA:  I reviewed her MRI of her lumbar spine that shows age-related changes I see nothing compressive she has no evidence of grow spondylolisthesis she does have some facet arthropathy at 4 5  Diagnoses/Plan:    Ms. Velez is a 44 y.o. female   This seems like a myofascial and/or arthritic type of back pain there is no evidence of neurogenic claudication certainly no evidence of sciatica and I spent about 30 minutes with her unpacking options for    1.  Given the chronicity of this I think nonsteroidal anti-inflammatories are okay I did bring up the option of using Cymbalta and explained its central nature and treating chronic arthritic type of symptoms it may be a good option for I written her prescription for this and instructed her on the side effects and the follow-up with her primary care doctor to try this if she wishes    2.  More than reasonable to have her former relationship with an interventional pain doctor I made a referral she can talk about lumbar epidural steroid blocks etc. to see if there is any injections when this flares up that would facilitate an earlier recovery    3.  Continue physical activity etc.    4.  I told her to give this some time if she still has persistent symptomatologies or developed sciatic leg pain I would consider EMG nerve conduction study lumbar flexion-extension film and/or myelogram that being said I highly would recommend steering clear of any surgical option given what her MRI looks like right now and explained that she should try to deal with this but would be happy to offer assistance if her symptoms persisted    To be a pleasure to see her back anytime in the future

## 2020-03-10 ENCOUNTER — TELEPHONE (OUTPATIENT)
Dept: PAIN MEDICINE | Facility: CLINIC | Age: 45
End: 2020-03-10

## 2020-03-10 NOTE — TELEPHONE ENCOUNTER
"      Chief Complaint: \"Pain in my lower back.\"     _____Back Pain       JRA (juvenile rheumatoid arthritis)   Seizure disorder     MRI of her lumbar spine facet arthropathy Borderline L4-5 canal stenosis, and mild right-sided   foraminal narrowing at this level and at L5-S1.     myofascial and/or arthritic type of back pain     lumbar epidural steroid blocks etc.   EMG     History of Present Illness:   Patient: Ms. Celsa Velez Dr., 44 y.o. female   Referring Physician: Ramiro Rico MD   Reason for Referral: Consultation for chronic intractable lower back pain.   Pain History: Patient reports a long-standing history of lower back pain, which began without incident. She is very active and works as a  and practices yoga. She has a history of juvenile rheumatoid arthritis. Pain has progressed in intensity over the past several months.   Pain Description: Constant pain with intermittent exacerbation, described as aching, burning and throbbing sensation.   Radiation of Pain: The pain does not radiate.   Pain intensity today: ___/10   Average pain intensity last week: ___/10   Pain intensity ranges from: ___/10 to __/10   Aggravating factors: Pain increases with twisting and bending.   Alleviating factors: Pain decreases with sitting and lying down.     Associated Symptoms:   Patient denies pain, numbness and weakness in the lower extremities.   Patient denies any new bladder or bowel problems.   Patient denies difficulties with her balance or recent falls.     Review of previous therapies and additional medical records:   Celsa Velez Dr. has already failed the following measures, including:   Conservative Measures: Oral analgesics, topical analgesics and physical therapy   Interventional Measures: None   Surgical Measures: No previous history of lumbar spine surgery   Celsa Velez Dr. underwent neurosurgical  consultation with Dr. Rico on 03/05/2020, and was found not to be a " surgical candidate.   Celsa Velez Dr. presents with significant comorbidities   _________________   In terms of current analgesics, Celsa Velez Dr. takes: ____________   I have reviewed [facility] Report #__________ consistent to medication reconciliation.   SOAPP: __________     Review of Diagnostic Studies:     MRI LUMBAR SPINE WO CONTRAST-   Sagittal images show no significant abnormalities of vertebral   alignment. Vertebral body heights and disc spaces are generally well   maintained. No compression deformity or significant vertebral marrow   edema is seen. Tip of the conus medullaris lies at L1. There is mild   relative narrowing of the canal at L4-5 due to broad-based disc bulge   and facet hypertrophy. Axial images   L1-L2, L2-L3: No significant canal or foraminal stenosis   L3-L4:Facet hypertrophy. The canal is lower limits of normal diameter as a result. Foramina appear well maintained.   L4-L5:Canal appears borderline stenotic due to broad-based disc   bulge and posterior element hypertrophy. Left neural foramen appears   normal. Right neural foramen appears only mildly narrowed.   L5-S1: Canal appears normal. There is perhaps mild right-sided   bony foraminal narrowing. Left neural foramen appears normal.

## 2020-06-01 ENCOUNTER — TRANSCRIBE ORDERS (OUTPATIENT)
Dept: ADMINISTRATIVE | Facility: HOSPITAL | Age: 45
End: 2020-06-01

## 2020-06-01 DIAGNOSIS — Z12.31 SCREENING MAMMOGRAM, ENCOUNTER FOR: Primary | ICD-10-CM

## 2020-06-18 ENCOUNTER — HOSPITAL ENCOUNTER (OUTPATIENT)
Dept: MAMMOGRAPHY | Facility: HOSPITAL | Age: 45
Discharge: HOME | End: 2020-06-18
Admitting: INTERNAL MEDICINE

## 2020-06-18 DIAGNOSIS — Z12.31 SCREENING MAMMOGRAM, ENCOUNTER FOR: ICD-10-CM

## 2020-06-18 PROCEDURE — 77063 BREAST TOMOSYNTHESIS BI: CPT

## 2020-06-18 PROCEDURE — 77067 SCR MAMMO BI INCL CAD: CPT

## 2020-06-18 PROCEDURE — 77067 SCR MAMMO BI INCL CAD: CPT | Performed by: RADIOLOGY

## 2020-06-18 PROCEDURE — 77063 BREAST TOMOSYNTHESIS BI: CPT | Performed by: RADIOLOGY

## 2020-10-08 DIAGNOSIS — E55.9 VITAMIN D DEFICIENCY: ICD-10-CM

## 2020-10-08 RX ORDER — ERGOCALCIFEROL 1.25 MG/1
CAPSULE ORAL
Qty: 5 CAPSULE | Refills: 2 | Status: SHIPPED | OUTPATIENT
Start: 2020-10-08 | End: 2021-11-22 | Stop reason: SDUPTHER

## 2020-11-05 PROCEDURE — U0003 INFECTIOUS AGENT DETECTION BY NUCLEIC ACID (DNA OR RNA); SEVERE ACUTE RESPIRATORY SYNDROME CORONAVIRUS 2 (SARS-COV-2) (CORONAVIRUS DISEASE [COVID-19]), AMPLIFIED PROBE TECHNIQUE, MAKING USE OF HIGH THROUGHPUT TECHNOLOGIES AS DESCRIBED BY CMS-2020-01-R: HCPCS | Performed by: FAMILY MEDICINE

## 2020-11-16 ENCOUNTER — OFFICE VISIT (OUTPATIENT)
Dept: INTERNAL MEDICINE | Facility: CLINIC | Age: 45
End: 2020-11-16

## 2020-11-16 ENCOUNTER — LAB (OUTPATIENT)
Dept: LAB | Facility: HOSPITAL | Age: 45
End: 2020-11-16

## 2020-11-16 VITALS
DIASTOLIC BLOOD PRESSURE: 64 MMHG | WEIGHT: 126.4 LBS | TEMPERATURE: 97.3 F | HEIGHT: 65 IN | HEART RATE: 86 BPM | BODY MASS INDEX: 21.06 KG/M2 | SYSTOLIC BLOOD PRESSURE: 90 MMHG | OXYGEN SATURATION: 100 %

## 2020-11-16 DIAGNOSIS — R31.9 HEMATURIA, UNSPECIFIED TYPE: ICD-10-CM

## 2020-11-16 DIAGNOSIS — Z00.00 ROUTINE GENERAL MEDICAL EXAMINATION AT A HEALTH CARE FACILITY: Primary | ICD-10-CM

## 2020-11-16 DIAGNOSIS — Z00.00 ROUTINE GENERAL MEDICAL EXAMINATION AT A HEALTH CARE FACILITY: ICD-10-CM

## 2020-11-16 DIAGNOSIS — Z12.11 SCREENING FOR COLON CANCER: ICD-10-CM

## 2020-11-16 DIAGNOSIS — Z23 NEED FOR INFLUENZA VACCINATION: ICD-10-CM

## 2020-11-16 LAB
BILIRUB BLD-MCNC: NEGATIVE MG/DL
CLARITY, POC: CLEAR
COLOR UR: YELLOW
DEPRECATED RDW RBC AUTO: 37.8 FL (ref 37–54)
ERYTHROCYTE [DISTWIDTH] IN BLOOD BY AUTOMATED COUNT: 11.7 % (ref 12.3–15.4)
GLUCOSE UR STRIP-MCNC: NEGATIVE MG/DL
HCT VFR BLD AUTO: 43.6 % (ref 34–46.6)
HGB BLD-MCNC: 14.4 G/DL (ref 12–15.9)
KETONES UR QL: NEGATIVE
LEUKOCYTE EST, POC: NEGATIVE
MCH RBC QN AUTO: 29.7 PG (ref 26.6–33)
MCHC RBC AUTO-ENTMCNC: 33 G/DL (ref 31.5–35.7)
MCV RBC AUTO: 89.9 FL (ref 79–97)
NITRITE UR-MCNC: NEGATIVE MG/ML
PH UR: 6 [PH] (ref 5–8)
PLATELET # BLD AUTO: 251 10*3/MM3 (ref 140–450)
PMV BLD AUTO: 11.7 FL (ref 6–12)
PROT UR STRIP-MCNC: NEGATIVE MG/DL
RBC # BLD AUTO: 4.85 10*6/MM3 (ref 3.77–5.28)
RBC # UR STRIP: ABNORMAL /UL
SP GR UR: 1.03 (ref 1–1.03)
UROBILINOGEN UR QL: NORMAL
WBC # BLD AUTO: 6.81 10*3/MM3 (ref 3.4–10.8)

## 2020-11-16 PROCEDURE — 90471 IMMUNIZATION ADMIN: CPT | Performed by: INTERNAL MEDICINE

## 2020-11-16 PROCEDURE — 80061 LIPID PANEL: CPT

## 2020-11-16 PROCEDURE — 87086 URINE CULTURE/COLONY COUNT: CPT

## 2020-11-16 PROCEDURE — 81003 URINALYSIS AUTO W/O SCOPE: CPT | Performed by: INTERNAL MEDICINE

## 2020-11-16 PROCEDURE — 90686 IIV4 VACC NO PRSV 0.5 ML IM: CPT | Performed by: INTERNAL MEDICINE

## 2020-11-16 PROCEDURE — 86803 HEPATITIS C AB TEST: CPT

## 2020-11-16 PROCEDURE — 85027 COMPLETE CBC AUTOMATED: CPT

## 2020-11-16 PROCEDURE — 80053 COMPREHEN METABOLIC PANEL: CPT

## 2020-11-16 PROCEDURE — 82306 VITAMIN D 25 HYDROXY: CPT

## 2020-11-16 PROCEDURE — 84443 ASSAY THYROID STIM HORMONE: CPT

## 2020-11-16 PROCEDURE — 99396 PREV VISIT EST AGE 40-64: CPT | Performed by: INTERNAL MEDICINE

## 2020-11-16 NOTE — PROGRESS NOTES
Chief Complaint   Patient presents with   • Annual Exam   • Gynecologic Exam   • Allergies       History of Present Illness  HM, Adult Female: The patient is being seen for a health maintenance evaluation.   Social history:  to David with Children. Occupation: .  General Health: The patient's health is described as good. She has regular dental visits. She denies vision problems. She denies hearing loss. Immunizations status: up to date.   Lifestyle:. She consumes a diverse and healthy diet. She does not have any weight concerns. She exercises regularly.Swims regularly and has had Yoga sessions at work.She does not use tobacco. She denies alcohol use. She denies drug use.   Reproductive health:. She reports normal menses. S/p Tubal.  Screening: Cancer screening reviewed and current.   Metabolic screening reviewed and current.   Risk screening reviewed and current.     Back doing better, did not go on cymbalta. Hip exercises helped.  Answers for HPI/ROS submitted by the patient on 11/16/2020   What is the primary reason for your visit?: Physical  To get allergy testing this afternoon.      Review of Systems   Constitutional: Negative for chills and fatigue.   HENT: Positive for postnasal drip. Negative for congestion, ear pain and sore throat.    Eyes: Positive for discharge and itching. Negative for pain, redness and visual disturbance.   Respiratory: Negative for cough and shortness of breath.    Cardiovascular: Negative for chest pain, palpitations and leg swelling.   Gastrointestinal: Negative for abdominal pain, diarrhea and nausea.   Endocrine: Negative for cold intolerance and heat intolerance.   Genitourinary: Negative for flank pain and urgency.   Musculoskeletal: Negative for arthralgias and gait problem.   Skin: Negative for pallor and rash.   Neurological: Positive for headaches. Negative for dizziness and weakness.   Psychiatric/Behavioral: Negative for dysphoric mood and sleep  "disturbance. The patient is not nervous/anxious.        Patient Active Problem List   Diagnosis   • IBS (irritable bowel syndrome)   • Vitamin D deficiency   • Hematuria   • Family history of breast cancer in mother       Social History     Socioeconomic History   • Marital status:      Spouse name: Not on file   • Number of children: Not on file   • Years of education: Not on file   • Highest education level: Not on file   Tobacco Use   • Smoking status: Never Smoker   • Smokeless tobacco: Never Used   Substance and Sexual Activity   • Alcohol use: Yes     Comment: SOCIAL   • Drug use: No   • Sexual activity: Yes     Partners: Male       Current Outpatient Medications on File Prior to Visit   Medication Sig Dispense Refill   • vitamin D (ERGOCALCIFEROL) 1.25 MG (41725 UT) capsule capsule TAKE ONE CAPSULE BY MOUTH ONCE WEEKLY 5 capsule 2   • Levocetirizine Dihydrochloride (XYZAL PO) Take  by mouth.       No current facility-administered medications on file prior to visit.        Allergies   Allergen Reactions   • Penicillins        BP 90/64   Pulse 86   Temp 97.3 °F (36.3 °C)   Ht 165.1 cm (65\")   Wt 57.3 kg (126 lb 6.4 oz)   LMP 10/20/2020 (Approximate)   SpO2 100% Comment: ra  Breastfeeding No   BMI 21.03 kg/m²            The following portions of the patient's history were reviewed and updated as appropriate: allergies, current medications, past family history, past medical history, past social history, past surgical history and problem list.    Physical Exam  Vitals signs and nursing note reviewed.   Constitutional:       Appearance: Normal appearance. She is well-developed.   HENT:      Head: Normocephalic and atraumatic.      Right Ear: External ear normal.      Left Ear: External ear normal.      Mouth/Throat:      Pharynx: No oropharyngeal exudate.   Eyes:      General: No scleral icterus.     Conjunctiva/sclera: Conjunctivae normal.      Pupils: Pupils are equal, round, and reactive to light. "   Neck:      Musculoskeletal: Normal range of motion and neck supple.      Thyroid: No thyromegaly.      Vascular: No carotid bruit.   Cardiovascular:      Rate and Rhythm: Normal rate and regular rhythm.      Pulses: Normal pulses.      Heart sounds: No murmur. No friction rub. No gallop.    Pulmonary:      Effort: Pulmonary effort is normal.      Breath sounds: Normal breath sounds. No rhonchi or rales.   Abdominal:      General: Bowel sounds are normal. There is no distension.      Palpations: Abdomen is soft.      Tenderness: There is no abdominal tenderness.   Skin:     General: Skin is warm and dry.      Findings: No erythema or rash.   Neurological:      Mental Status: She is alert and oriented to person, place, and time.      Cranial Nerves: No cranial nerve deficit.      Sensory: No sensory deficit.      Coordination: Coordination normal.      Gait: Gait normal.      Deep Tendon Reflexes: Reflexes normal.   Psychiatric:         Mood and Affect: Mood normal.         Judgment: Judgment normal.         Results for orders placed or performed in visit on 11/16/20   POCT urinalysis dipstick, automated    Specimen: Urine   Result Value Ref Range    Color Yellow Yellow, Straw, Dark Yellow, Clarissa    Clarity, UA Clear Clear    Specific Gravity  1.030 1.005 - 1.030    pH, Urine 6.0 5.0 - 8.0    Leukocytes Negative Negative    Nitrite, UA Negative Negative    Protein, POC Negative Negative mg/dL    Glucose, UA Negative Negative, 1000 mg/dL (3+) mg/dL    Ketones, UA Negative Negative    Urobilinogen, UA Normal Normal    Bilirubin Negative Negative    Blood, UA 1+ (A) Negative       Diagnoses and all orders for this visit:    1. Routine general medical examination at a health care facility (Primary)  -     CBC (No Diff); Future  -     Comprehensive Metabolic Panel; Future  -     Lipid Panel; Future  -     TSH; Future  -     Vitamin D 25 Hydroxy; Future  -     Hepatitis C Antibody; Future  -     POCT urinalysis dipstick,  automated    2. Need for influenza vaccination  -     Fluarix/Fluzone/Afluria Quad>6 Months    3. Screening for colon cancer  -     Ambulatory Referral For Screening Colonoscopy    4. Hematuria, unspecified type  -     Urine Culture - Urine, Urine, Clean Catch; Future          Health Maintenance   Topic Date Due   • COLONOSCOPY  01/24/2019   • MAMMOGRAM  06/18/2021   • ANNUAL PHYSICAL  11/17/2021   • PAP SMEAR  10/14/2022   • TDAP/TD VACCINES (2 - Td) 10/03/2026   • HEPATITIS C SCREENING  Completed   • INFLUENZA VACCINE  Completed   • Pneumococcal Vaccine 0-64  Aged Out       Discussion/Summary  Impression: health maintenance visit. Currently, she eats an adequate diet and has an adequate exercise regimen.   Cervical cancer screening:Pap smear is current.   Breast cancer screening: mammogram is current.   Colorectal cancer screening: colonoscopy is current.  Osteoporosis screening: Bone mineral density test is .   Screening lab work includes hemoglobin, glucose, lipid profile, thyroid function testing, 25-hydroxyvitamin D and urinalysis.   Immunizations are needed, immunizations will be given as outlined in the orders   Advice and education were given regarding cardiovascular risk reduction, healthy dietary habits, Seatbelt and helmet use and self skin examination.     Return in about 1 year (around 11/16/2021) for Annual.

## 2020-11-17 LAB
25(OH)D3 SERPL-MCNC: 34.6 NG/ML (ref 30–100)
ALBUMIN SERPL-MCNC: 4.8 G/DL (ref 3.5–5.2)
ALBUMIN/GLOB SERPL: 1.9 G/DL
ALP SERPL-CCNC: 45 U/L (ref 39–117)
ALT SERPL W P-5'-P-CCNC: 10 U/L (ref 1–33)
ANION GAP SERPL CALCULATED.3IONS-SCNC: 10.4 MMOL/L (ref 5–15)
AST SERPL-CCNC: 16 U/L (ref 1–32)
BACTERIA SPEC AEROBE CULT: NO GROWTH
BILIRUB SERPL-MCNC: 0.5 MG/DL (ref 0–1.2)
BUN SERPL-MCNC: 16 MG/DL (ref 6–20)
BUN/CREAT SERPL: 23.2 (ref 7–25)
CALCIUM SPEC-SCNC: 9.5 MG/DL (ref 8.6–10.5)
CHLORIDE SERPL-SCNC: 103 MMOL/L (ref 98–107)
CHOLEST SERPL-MCNC: 153 MG/DL (ref 0–200)
CO2 SERPL-SCNC: 24.6 MMOL/L (ref 22–29)
CREAT SERPL-MCNC: 0.69 MG/DL (ref 0.57–1)
GFR SERPL CREATININE-BSD FRML MDRD: 92 ML/MIN/1.73
GLOBULIN UR ELPH-MCNC: 2.5 GM/DL
GLUCOSE SERPL-MCNC: 82 MG/DL (ref 65–99)
HCV AB SER DONR QL: NORMAL
HDLC SERPL-MCNC: 76 MG/DL (ref 40–60)
LDLC SERPL CALC-MCNC: 65 MG/DL (ref 0–100)
LDLC/HDLC SERPL: 0.85 {RATIO}
POTASSIUM SERPL-SCNC: 4.4 MMOL/L (ref 3.5–5.2)
PROT SERPL-MCNC: 7.3 G/DL (ref 6–8.5)
SODIUM SERPL-SCNC: 138 MMOL/L (ref 136–145)
TRIGL SERPL-MCNC: 61 MG/DL (ref 0–150)
TSH SERPL DL<=0.05 MIU/L-ACNC: 1.17 UIU/ML (ref 0.27–4.2)
VLDLC SERPL-MCNC: 12 MG/DL (ref 5–40)

## 2021-04-22 ENCOUNTER — LAB (OUTPATIENT)
Dept: LAB | Facility: HOSPITAL | Age: 46
End: 2021-04-22

## 2021-04-22 ENCOUNTER — OFFICE VISIT (OUTPATIENT)
Dept: INTERNAL MEDICINE | Facility: CLINIC | Age: 46
End: 2021-04-22

## 2021-04-22 VITALS
WEIGHT: 126.4 LBS | OXYGEN SATURATION: 98 % | SYSTOLIC BLOOD PRESSURE: 108 MMHG | BODY MASS INDEX: 21.03 KG/M2 | DIASTOLIC BLOOD PRESSURE: 66 MMHG | TEMPERATURE: 97.8 F | HEART RATE: 68 BPM

## 2021-04-22 DIAGNOSIS — N92.1 MENORRHAGIA WITH IRREGULAR CYCLE: ICD-10-CM

## 2021-04-22 DIAGNOSIS — Z11.52 ENCOUNTER FOR SCREENING FOR COVID-19: ICD-10-CM

## 2021-04-22 DIAGNOSIS — N92.0 MENORRHAGIA WITH REGULAR CYCLE: Primary | ICD-10-CM

## 2021-04-22 LAB
ESTRADIOL SERPL HS-MCNC: 14.9 PG/ML
FSH SERPL-ACNC: 15.8 MIU/ML
LH SERPL-ACNC: 5.82 MIU/ML
TSH SERPL DL<=0.05 MIU/L-ACNC: 0.9 UIU/ML (ref 0.27–4.2)

## 2021-04-22 PROCEDURE — 86769 SARS-COV-2 COVID-19 ANTIBODY: CPT

## 2021-04-22 PROCEDURE — 82627 DEHYDROEPIANDROSTERONE: CPT

## 2021-04-22 PROCEDURE — 84443 ASSAY THYROID STIM HORMONE: CPT

## 2021-04-22 PROCEDURE — 99214 OFFICE O/P EST MOD 30 MIN: CPT | Performed by: NURSE PRACTITIONER

## 2021-04-22 PROCEDURE — 83002 ASSAY OF GONADOTROPIN (LH): CPT

## 2021-04-22 PROCEDURE — 82670 ASSAY OF TOTAL ESTRADIOL: CPT

## 2021-04-22 PROCEDURE — 83001 ASSAY OF GONADOTROPIN (FSH): CPT

## 2021-04-22 PROCEDURE — 36415 COLL VENOUS BLD VENIPUNCTURE: CPT

## 2021-04-22 RX ORDER — NORETHINDRONE ACETATE AND ETHINYL ESTRADIOL, ETHINYL ESTRADIOL AND FERROUS FUMARATE 1MG-10(24)
1 KIT ORAL DAILY
Qty: 28 TABLET | Refills: 0 | Status: SHIPPED | OUTPATIENT
Start: 2021-04-22 | End: 2021-11-22

## 2021-04-22 NOTE — PROGRESS NOTES
Chief Complaint   Patient presents with   • Menstrual Problem       History of Present Illness    45 y.o.female presents for menstrual problem.    Started period on 4th was seeming like normal, but then has not stopped bleeding. Her menses before are normal regular.  No dizziness or short of breath. Asking for a 1 month course birth control to reset periods. Nonsmoker; no hx dvt.  Pt requesting covid antibody test. Last covid vac end feb.      [facility]  The following portions of the patient's history were reviewed and updated as appropriate: allergies, current medications, past family history, past medical history, past social history, past surgical history and problem list.     Past Medical History:   Diagnosis Date   • Acute sinusitis    • Allergic    • Arthritis    • JRA (juvenile rheumatoid arthritis) (CMS/[facility])    • Seizure disorder (CMS/[facility])    • UTI (urinary tract infection)     Last Impression: 02 Nov 2014  Send urine for culture. Treat with cipro until results are  available.  Almaz Cavazos (Internal Medicine)      Allergies   Allergen Reactions   • Penicillins       Social History     Tobacco Use   • Smoking status: Never Smoker   • Smokeless tobacco: Never Used   Substance Use Topics   • Alcohol use: Yes     Comment: SOCIAL   • Drug use: No         Current Outpatient Medications:   •  Levocetirizine Dihydrochloride (XYZAL PO), Take  by mouth., Disp: , Rfl:   •  vitamin D (ERGOCALCIFEROL) 1.25 MG (91788 UT) capsule capsule, TAKE ONE CAPSULE BY MOUTH ONCE WEEKLY, Disp: 5 capsule, Rfl: 2    VITALS:  /66   Pulse 68   Temp 97.8 °F (36.6 °C)   Wt 57.3 kg (126 lb 6.4 oz)   SpO2 98%   Breastfeeding No   BMI 21.03 kg/m²     Physical Exam  HENT:      Head: Normocephalic.   Cardiovascular:      Rate and Rhythm: Normal rate and regular rhythm.      Heart sounds: Normal heart sounds.   Pulmonary:      Effort: Pulmonary effort is normal. No respiratory distress.      Breath sounds: Normal breath sounds.    Skin:     General: Skin is warm and dry.   Neurological:      Mental Status: She is alert and oriented to person, place, and time.   Psychiatric:         Mood and Affect: Mood normal.         Behavior: Behavior normal.           Assessment and Plan    Diagnoses and all orders for this visit:    1. Menorrhagia with regular cycle (Primary)  -     Estradiol; Future  -     DHEA-sulfate; Future  -     Follicle stimulating hormone; Future  -     Luteinizing hormone; Future  -     TSH; Future  -     Norethin-Eth Estrad-Fe Biphas (Lo Loestrin Fe) 1 MG-10 MCG / 10 MCG tablet; Take 1 tablet by mouth Daily.  Dispense: 28 tablet; Refill: 0  Will only do one month of LoLo, then stop.  2. Encounter for screening for COVID-19  -     SARS-CoV-2 Antibodies (Roche); Future          Follow Up   Return if symptoms worsen or fail to improve.      I discussed the patients findings and my recommendations with patient.  Patient was encouraged to keep me informed of any acute changes, lack of improvement, or any new concerning symptoms.  Patient voiced understanding of all instructions and denied further questions.    Electronically signed by:    DONOVAN White  04/22/2021    EMR Dragon/Transcription Disclaimer:  Much of this encounter note is an electronic transcription/translation of spoken language to printed text.  The electronic translation of spoken language may permit erroneous, or at times, nonsensical words or phrases to be inadvertently transcribed.  Although I have reviewed the note for such errors, some may still exist

## 2021-04-24 LAB
DHEA-S SERPL-MCNC: 71.9 UG/DL (ref 41.2–243.7)
SARS-COV-2 AB SERPL QL IA: NEGATIVE

## 2021-04-26 NOTE — PROGRESS NOTES
Hi Dr. Velez. Thyroid labs ok. Hormone levels maybe premenopausal with estradiol level 14.9; could still be that low in the follicular phase. Your covid antibody test was negative. Data and CDC website says vaccines may not produce a positive antibody test because the vaccine interferes with protein of the virus.  No further testing for antibodies is recommended at this time.

## 2021-09-30 ENCOUNTER — TRANSCRIBE ORDERS (OUTPATIENT)
Dept: ADMINISTRATIVE | Facility: HOSPITAL | Age: 46
End: 2021-09-30

## 2021-09-30 DIAGNOSIS — Z12.31 VISIT FOR SCREENING MAMMOGRAM: Primary | ICD-10-CM

## 2021-10-14 ENCOUNTER — HOSPITAL ENCOUNTER (OUTPATIENT)
Dept: MAMMOGRAPHY | Facility: HOSPITAL | Age: 46
Discharge: HOME | End: 2021-10-14
Admitting: INTERNAL MEDICINE

## 2021-10-14 DIAGNOSIS — Z12.31 VISIT FOR SCREENING MAMMOGRAM: ICD-10-CM

## 2021-10-14 PROCEDURE — 77063 BREAST TOMOSYNTHESIS BI: CPT | Performed by: RADIOLOGY

## 2021-10-14 PROCEDURE — 77063 BREAST TOMOSYNTHESIS BI: CPT

## 2021-10-14 PROCEDURE — 77067 SCR MAMMO BI INCL CAD: CPT | Performed by: RADIOLOGY

## 2021-10-14 PROCEDURE — 77067 SCR MAMMO BI INCL CAD: CPT

## 2021-11-18 ENCOUNTER — TELEPHONE (OUTPATIENT)
Dept: INTERNAL MEDICINE | Facility: CLINIC | Age: 46
End: 2021-11-18

## 2021-11-18 ENCOUNTER — HOSPITAL ENCOUNTER (OUTPATIENT)
Dept: MAMMOGRAPHY | Facility: HOSPITAL | Age: 46
Discharge: HOME | End: 2021-11-18

## 2021-11-18 ENCOUNTER — HOSPITAL ENCOUNTER (OUTPATIENT)
Dept: ULTRASOUND IMAGING | Facility: HOSPITAL | Age: 46
Discharge: HOME | End: 2021-11-18

## 2021-11-18 DIAGNOSIS — R92.8 ABNORMAL MAMMOGRAM: ICD-10-CM

## 2021-11-18 PROCEDURE — 76642 ULTRASOUND BREAST LIMITED: CPT

## 2021-11-18 PROCEDURE — 77066 DX MAMMO INCL CAD BI: CPT | Performed by: RADIOLOGY

## 2021-11-18 PROCEDURE — 76642 ULTRASOUND BREAST LIMITED: CPT | Performed by: RADIOLOGY

## 2021-11-18 PROCEDURE — G0279 TOMOSYNTHESIS, MAMMO: HCPCS

## 2021-11-18 PROCEDURE — 77062 BREAST TOMOSYNTHESIS BI: CPT | Performed by: RADIOLOGY

## 2021-11-18 PROCEDURE — 77066 DX MAMMO INCL CAD BI: CPT

## 2021-11-18 NOTE — TELEPHONE ENCOUNTER
Patient called and stated that she had mammogram today and they are requesting an MRI be ordered prior to proceeding with additional views.  States all should be on report.  States her R breast has Questionable areas.

## 2021-11-19 ENCOUNTER — TELEPHONE (OUTPATIENT)
Dept: INTERNAL MEDICINE | Facility: CLINIC | Age: 46
End: 2021-11-19

## 2021-11-19 NOTE — TELEPHONE ENCOUNTER
PT CALLED TO CHECK IN ON HER MRI ORDER. I ADVISED HER THAT WE WERE WAITING ON THE REPORT TO BE SENT TO US, AND SHE ASKED WE JUST GIVE HER A CALL WHEN EVERYTHING IS IN HER CHART.

## 2021-11-20 NOTE — TELEPHONE ENCOUNTER
MRI was ordered yesterday. Mammography does that as well, she should get a call from them soon. Please let her know.    thanks

## 2021-11-22 ENCOUNTER — OFFICE VISIT (OUTPATIENT)
Dept: INTERNAL MEDICINE | Facility: CLINIC | Age: 46
End: 2021-11-22

## 2021-11-22 VITALS
SYSTOLIC BLOOD PRESSURE: 100 MMHG | HEART RATE: 68 BPM | BODY MASS INDEX: 21.26 KG/M2 | OXYGEN SATURATION: 99 % | DIASTOLIC BLOOD PRESSURE: 64 MMHG | WEIGHT: 127.6 LBS | HEIGHT: 65 IN

## 2021-11-22 DIAGNOSIS — R92.8 ABNORMAL MAMMOGRAM: ICD-10-CM

## 2021-11-22 DIAGNOSIS — Z00.00 ROUTINE GENERAL MEDICAL EXAMINATION AT A HEALTH CARE FACILITY: Primary | ICD-10-CM

## 2021-11-22 DIAGNOSIS — E55.9 VITAMIN D DEFICIENCY: ICD-10-CM

## 2021-11-22 DIAGNOSIS — Z23 NEED FOR INFLUENZA VACCINATION: ICD-10-CM

## 2021-11-22 DIAGNOSIS — Z80.3 FAMILY HISTORY OF BREAST CANCER IN MOTHER: ICD-10-CM

## 2021-11-22 PROCEDURE — 99396 PREV VISIT EST AGE 40-64: CPT | Performed by: INTERNAL MEDICINE

## 2021-11-22 PROCEDURE — 90471 IMMUNIZATION ADMIN: CPT | Performed by: INTERNAL MEDICINE

## 2021-11-22 PROCEDURE — 90686 IIV4 VACC NO PRSV 0.5 ML IM: CPT | Performed by: INTERNAL MEDICINE

## 2021-11-22 RX ORDER — ERGOCALCIFEROL 1.25 MG/1
50000 CAPSULE ORAL WEEKLY
Qty: 5 CAPSULE | Refills: 4 | Status: SHIPPED | OUTPATIENT
Start: 2021-11-22 | End: 2022-09-06

## 2021-11-22 NOTE — PROGRESS NOTES
Immunization  Immunization performed in RD by Tana Barrios MA. Patient tolerated the procedure well without complications.  11/22/21   Tana Barrios MA

## 2021-11-22 NOTE — PROGRESS NOTES
Chief Complaint   Patient presents with   • Annual Exam       History of Present Illness  HM, Adult Female:  The patient is being seen for a health maintenance evaluation.   Social history:  to David with Children, 2 sons 16 yo and 12 yo. Occupation: .  General Health: The patient's health is described as good. She has regular dental visits. She denies vision problems. She denies hearing loss. Immunizations status: up to date.   Lifestyle:. She consumes a diverse and healthy diet. She does not have any weight concerns. She exercises regularly.Swims regularly and has had Yoga sessions at work.She does not use tobacco. She denies alcohol use. She denies drug use.   Reproductive health:. She reports normal menses. S/p Tubal.  Screening: Cancer screening reviewed and current.   Metabolic screening reviewed and current.   Risk screening reviewed and current. Mom had DCIS( late 60s- 70) and , maternal grandmother had Recurrent breast cancer( came back twice)  Mom's first cousin had Breast Cancer in her 70s.    Having some sinus headaches, came off the xyzal, an dn quercetin instead.  Awaiting breast MRI for breast mass and abnormal lymph nodes.  Was advised genetic screen.    Would like covid antibody test as has not had the booster yet.      Review of Systems   Constitutional: Negative for chills and fatigue.   HENT: Negative for congestion, ear pain and sore throat.    Eyes: Negative for pain, redness and visual disturbance.   Respiratory: Negative for cough and shortness of breath.    Cardiovascular: Negative for chest pain, palpitations and leg swelling.   Gastrointestinal: Negative for abdominal pain, diarrhea and nausea.   Endocrine: Negative for cold intolerance and heat intolerance.   Genitourinary: Negative for flank pain and urgency.   Musculoskeletal: Negative for arthralgias and gait problem.   Skin: Negative for pallor and rash.   Neurological: Positive for headaches. Negative for dizziness  "and weakness.   Psychiatric/Behavioral: Negative for dysphoric mood and sleep disturbance. The patient is not nervous/anxious.        Patient Active Problem List   Diagnosis   • IBS (irritable bowel syndrome)   • Vitamin D deficiency   • Hematuria   • Family history of breast cancer in mother       Social History     Socioeconomic History   • Marital status:    Tobacco Use   • Smoking status: Never Smoker   • Smokeless tobacco: Never Used   Substance and Sexual Activity   • Alcohol use: Yes     Comment: SOCIAL   • Drug use: No   • Sexual activity: Yes     Partners: Male       Current Outpatient Medications on File Prior to Visit   Medication Sig Dispense Refill   • [DISCONTINUED] vitamin D (ERGOCALCIFEROL) 1.25 MG (15967 UT) capsule capsule TAKE ONE CAPSULE BY MOUTH ONCE WEEKLY 5 capsule 2   • [DISCONTINUED] Levocetirizine Dihydrochloride (XYZAL PO) Take  by mouth.     • [DISCONTINUED] Norethin-Eth Estrad-Fe Biphas (Lo Loestrin Fe) 1 MG-10 MCG / 10 MCG tablet Take 1 tablet by mouth Daily. 28 tablet 0     No current facility-administered medications on file prior to visit.       Allergies   Allergen Reactions   • Penicillins        /64   Pulse 68   Ht 165.1 cm (65\")   Wt 57.9 kg (127 lb 9.6 oz)   LMP 11/21/2021 (Exact Date)   SpO2 99% Comment: ra  BMI 21.23 kg/m²            The following portions of the patient's history were reviewed and updated as appropriate: allergies, current medications, past family history, past medical history, past social history, past surgical history and problem list.    Physical Exam  Constitutional:       General: She is not in acute distress.     Appearance: Normal appearance.   HENT:      Head: Normocephalic and atraumatic.      Right Ear: Tympanic membrane and external ear normal.      Left Ear: Tympanic membrane and external ear normal.      Nose: Nose normal.      Mouth/Throat:      Mouth: Mucous membranes are moist.   Eyes:      General: No scleral " icterus.  Neck:      Vascular: No carotid bruit.   Cardiovascular:      Rate and Rhythm: Normal rate and regular rhythm.      Pulses: Normal pulses.      Heart sounds: Normal heart sounds. No murmur heard.  No friction rub. No gallop.    Pulmonary:      Effort: Pulmonary effort is normal.      Breath sounds: Normal breath sounds. No rhonchi or rales.   Abdominal:      General: Bowel sounds are normal. There is no distension.      Palpations: Abdomen is soft.      Tenderness: There is no right CVA tenderness, left CVA tenderness, guarding or rebound.      Hernia: No hernia is present.   Musculoskeletal:         General: No tenderness. Normal range of motion.      Cervical back: Normal range of motion.      Right lower leg: No edema.      Left lower leg: No edema.   Lymphadenopathy:      Cervical: No cervical adenopathy.   Skin:     General: Skin is warm.      Findings: No rash.   Neurological:      General: No focal deficit present.      Mental Status: She is alert and oriented to person, place, and time. Mental status is at baseline.      Cranial Nerves: No cranial nerve deficit.      Sensory: No sensory deficit.      Coordination: Coordination normal.      Gait: Gait normal.      Deep Tendon Reflexes: Reflexes normal.   Psychiatric:         Mood and Affect: Mood normal.         Behavior: Behavior normal.         Results for orders placed or performed in visit on 04/22/21   Estradiol    Specimen: Blood   Result Value Ref Range    Estradiol 14.9 pg/mL   DHEA-sulfate    Specimen: Blood   Result Value Ref Range    DHEA-Sulfate 71.9 41.2 - 243.7 ug/dL   Follicle stimulating hormone    Specimen: Blood   Result Value Ref Range    FSH 15.80 mIU/mL   Luteinizing hormone    Specimen: Blood   Result Value Ref Range    LH 5.82 mIU/mL   TSH    Specimen: Blood   Result Value Ref Range    TSH 0.903 0.270 - 4.200 uIU/mL   SARS-CoV-2 Antibodies (Roche)    Specimen: Blood   Result Value Ref Range    SARS-COV-2 ANTIBODIES Negative  Negative       Diagnoses and all orders for this visit:    1. Routine general medical examination at a health care facility (Primary)  -     Cancel: POCT urinalysis dipstick, automated  -     CBC (No Diff); Future  -     Comprehensive Metabolic Panel; Future  -     Lipid Panel; Future  -     TSH; Future  -     Vitamin B12; Future    2. Vitamin D deficiency  -     vitamin D (ERGOCALCIFEROL) 1.25 MG (73740 UT) capsule capsule; Take 1 capsule by mouth 1 (One) Time Per Week.  Dispense: 5 capsule; Refill: 4    3. Need for influenza vaccination  -     FluLaval/Fluarix/Fluzone >6 Months    4. Family history of breast cancer in mother  -     Ambulatory Referral to Genetic Counseling/Testing - [facility]    5. Abnormal mammogram  Comments:  awaiting breast MRI( ordered)          Health Maintenance   Topic Date Due   • PAP SMEAR  10/14/2022   • MAMMOGRAM  11/18/2022   • ANNUAL PHYSICAL  11/23/2022   • COLORECTAL CANCER SCREENING  02/16/2026   • TDAP/TD VACCINES (2 - Td or Tdap) 10/03/2026   • HEPATITIS C SCREENING  Completed   • COVID-19 Vaccine  Completed   • INFLUENZA VACCINE  Completed   • Pneumococcal Vaccine 0-64  Aged Out       Discussion/Summary  Impression: health maintenance visit. Currently, she eats an adequate diet and has an adequate exercise regimen.   Cervical cancer screening:Pap smear is current.   Breast cancer screening: mammogram is current.   Colorectal cancer screening: colonoscopy is current.  Osteoporosis screening: Bone mineral density test is not indcated .   CT low dose screen - not indicated  Screening lab work includes hemoglobin, glucose, lipid profile, thyroid function testing, 25-hydroxyvitamin D and urinalysis.   Immunizations are needed, immunizations will be given as outlined in the orders   Advice and education were given regarding cardiovascular risk reduction, healthy dietary habits, Seatbelt and helmet use and self skin examination.     Return in about 1 year (around 11/22/2022) for  Annual.      Electronically signed by:    Yecenia Giang MD

## 2021-11-23 LAB
ALBUMIN SERPL-MCNC: 4.6 G/DL (ref 3.8–4.8)
ALBUMIN/GLOB SERPL: 2.2 {RATIO} (ref 1.2–2.2)
ALP SERPL-CCNC: 52 IU/L (ref 44–121)
ALT SERPL-CCNC: 10 IU/L (ref 0–32)
AST SERPL-CCNC: 15 IU/L (ref 0–40)
BASOPHILS # BLD AUTO: 0.1 X10E3/UL (ref 0–0.2)
BASOPHILS NFR BLD AUTO: 1 %
BILIRUB SERPL-MCNC: 0.5 MG/DL (ref 0–1.2)
BUN SERPL-MCNC: 14 MG/DL (ref 6–24)
BUN/CREAT SERPL: 20 (ref 9–23)
CALCIUM SERPL-MCNC: 9.2 MG/DL (ref 8.7–10.2)
CHLORIDE SERPL-SCNC: 104 MMOL/L (ref 96–106)
CO2 SERPL-SCNC: 23 MMOL/L (ref 20–29)
CREAT SERPL-MCNC: 0.71 MG/DL (ref 0.57–1)
EOSINOPHIL # BLD AUTO: 0.2 X10E3/UL (ref 0–0.4)
EOSINOPHIL NFR BLD AUTO: 3 %
ERYTHROCYTE [DISTWIDTH] IN BLOOD BY AUTOMATED COUNT: 11.7 % (ref 11.7–15.4)
GLOBULIN SER CALC-MCNC: 2.1 G/DL (ref 1.5–4.5)
GLUCOSE SERPL-MCNC: 83 MG/DL (ref 65–99)
HCT VFR BLD AUTO: 41.4 % (ref 34–46.6)
HGB BLD-MCNC: 13.6 G/DL (ref 11.1–15.9)
IMM GRANULOCYTES # BLD AUTO: 0 X10E3/UL (ref 0–0.1)
IMM GRANULOCYTES NFR BLD AUTO: 0 %
LYMPHOCYTES # BLD AUTO: 1.4 X10E3/UL (ref 0.7–3.1)
LYMPHOCYTES NFR BLD AUTO: 23 %
MCH RBC QN AUTO: 30 PG (ref 26.6–33)
MCHC RBC AUTO-ENTMCNC: 32.9 G/DL (ref 31.5–35.7)
MCV RBC AUTO: 91 FL (ref 79–97)
MONOCYTES # BLD AUTO: 0.3 X10E3/UL (ref 0.1–0.9)
MONOCYTES NFR BLD AUTO: 5 %
NEUTROPHILS # BLD AUTO: 4.1 X10E3/UL (ref 1.4–7)
NEUTROPHILS NFR BLD AUTO: 68 %
PLATELET # BLD AUTO: 251 X10E3/UL (ref 150–450)
POTASSIUM SERPL-SCNC: 4.6 MMOL/L (ref 3.5–5.2)
PROT SERPL-MCNC: 6.7 G/DL (ref 6–8.5)
RBC # BLD AUTO: 4.54 X10E6/UL (ref 3.77–5.28)
SODIUM SERPL-SCNC: 139 MMOL/L (ref 134–144)
TSH SERPL DL<=0.005 MIU/L-ACNC: 0.91 UIU/ML (ref 0.45–4.5)
VIT B12 SERPL-MCNC: 278 PG/ML (ref 232–1245)
WBC # BLD AUTO: 6.1 X10E3/UL (ref 3.4–10.8)

## 2021-12-01 ENCOUNTER — APPOINTMENT (OUTPATIENT)
Dept: MRI IMAGING | Facility: HOSPITAL | Age: 46
End: 2021-12-01

## 2021-12-06 ENCOUNTER — HOSPITAL ENCOUNTER (OUTPATIENT)
Dept: MRI IMAGING | Facility: HOSPITAL | Age: 46
Discharge: HOME | End: 2021-12-06
Admitting: INTERNAL MEDICINE

## 2021-12-06 DIAGNOSIS — Z80.3 FAMILY HISTORY OF MALIGNANT NEOPLASM OF BREAST: ICD-10-CM

## 2021-12-06 DIAGNOSIS — R92.8 ABNORMAL FINDINGS ON DIAGNOSTIC IMAGING OF BREAST: ICD-10-CM

## 2021-12-06 PROCEDURE — 77049 MRI BREAST C-+ W/CAD BI: CPT

## 2021-12-06 PROCEDURE — A9577 INJ MULTIHANCE: HCPCS | Performed by: INTERNAL MEDICINE

## 2021-12-06 PROCEDURE — 77049 MRI BREAST C-+ W/CAD BI: CPT | Performed by: RADIOLOGY

## 2021-12-06 PROCEDURE — 0 GADOBENATE DIMEGLUMINE 529 MG/ML SOLUTION: Performed by: INTERNAL MEDICINE

## 2021-12-06 PROCEDURE — C8937 CAD BREAST MRI: HCPCS

## 2021-12-06 RX ADMIN — GADOBENATE DIMEGLUMINE 10 ML: 529 INJECTION, SOLUTION INTRAVENOUS at 09:08

## 2021-12-13 ENCOUNTER — HOSPITAL ENCOUNTER (OUTPATIENT)
Dept: ULTRASOUND IMAGING | Facility: HOSPITAL | Age: 46
Discharge: HOME | End: 2021-12-13

## 2021-12-13 ENCOUNTER — HOSPITAL ENCOUNTER (OUTPATIENT)
Dept: MAMMOGRAPHY | Facility: HOSPITAL | Age: 46
Discharge: HOME | End: 2021-12-13

## 2021-12-13 DIAGNOSIS — R92.8 ABNORMAL MAMMOGRAM: ICD-10-CM

## 2021-12-13 PROCEDURE — 0 LIDOCAINE 1 % SOLUTION: Performed by: RADIOLOGY

## 2021-12-13 PROCEDURE — 76642 ULTRASOUND BREAST LIMITED: CPT | Performed by: RADIOLOGY

## 2021-12-13 PROCEDURE — 76942 ECHO GUIDE FOR BIOPSY: CPT | Performed by: RADIOLOGY

## 2021-12-13 PROCEDURE — 88307 TISSUE EXAM BY PATHOLOGIST: CPT | Performed by: INTERNAL MEDICINE

## 2021-12-13 PROCEDURE — 77065 DX MAMMO INCL CAD UNI: CPT | Performed by: RADIOLOGY

## 2021-12-13 PROCEDURE — 88360 TUMOR IMMUNOHISTOCHEM/MANUAL: CPT | Performed by: INTERNAL MEDICINE

## 2021-12-13 PROCEDURE — A4648 IMPLANTABLE TISSUE MARKER: HCPCS

## 2021-12-13 PROCEDURE — 19083 BX BREAST 1ST LESION US IMAG: CPT | Performed by: RADIOLOGY

## 2021-12-13 PROCEDURE — 76642 ULTRASOUND BREAST LIMITED: CPT

## 2021-12-13 PROCEDURE — 88305 TISSUE EXAM BY PATHOLOGIST: CPT | Performed by: INTERNAL MEDICINE

## 2021-12-13 PROCEDURE — 76942 ECHO GUIDE FOR BIOPSY: CPT

## 2021-12-13 PROCEDURE — 88342 IMHCHEM/IMCYTCHM 1ST ANTB: CPT | Performed by: INTERNAL MEDICINE

## 2021-12-13 PROCEDURE — 38505 NEEDLE BIOPSY LYMPH NODES: CPT | Performed by: RADIOLOGY

## 2021-12-13 RX ORDER — LIDOCAINE HYDROCHLORIDE 10 MG/ML
5 INJECTION, SOLUTION INFILTRATION; PERINEURAL ONCE
Status: COMPLETED | OUTPATIENT
Start: 2021-12-13 | End: 2021-12-13

## 2021-12-13 RX ORDER — LIDOCAINE HYDROCHLORIDE AND EPINEPHRINE 10; 10 MG/ML; UG/ML
10 INJECTION, SOLUTION INFILTRATION; PERINEURAL ONCE
Status: COMPLETED | OUTPATIENT
Start: 2021-12-13 | End: 2021-12-13

## 2021-12-13 RX ADMIN — Medication 4 ML: at 11:43

## 2021-12-13 RX ADMIN — LIDOCAINE HYDROCHLORIDE,EPINEPHRINE BITARTRATE 10 ML: 10; .01 INJECTION, SOLUTION INFILTRATION; PERINEURAL at 11:43

## 2021-12-13 RX ADMIN — LIDOCAINE HYDROCHLORIDE,EPINEPHRINE BITARTRATE 5 ML: 10; .01 INJECTION, SOLUTION INFILTRATION; PERINEURAL at 11:35

## 2021-12-13 RX ADMIN — Medication 5 ML: at 11:34

## 2021-12-13 NOTE — PROGRESS NOTES
Alert and orientated. Denies discomfort. No active bleeding. Steri-strips not visualized, gauze dressing intact at both sites. Ice packs given. Verbalizes and demonstrates understanding of post-care instructions, written copy given.

## 2021-12-15 LAB
CYTO UR: NORMAL
LAB AP CASE REPORT: NORMAL
LAB AP CLINICAL INFORMATION: NORMAL
LAB AP DIAGNOSIS COMMENT: NORMAL
PATH REPORT.FINAL DX SPEC: NORMAL
PATH REPORT.GROSS SPEC: NORMAL

## 2021-12-16 ENCOUNTER — TELEPHONE (OUTPATIENT)
Dept: MAMMOGRAPHY | Facility: HOSPITAL | Age: 46
End: 2021-12-16

## 2021-12-16 NOTE — TELEPHONE ENCOUNTER
Returned patient call; patient informed pathology & recommendation has not been sent from the radiologist. Patient requests either Dr Barahona or Dr DAHLIA Brown for surgical consult. Encouraged to call back with further questions or concerns. Patient verbalized understanding.

## 2021-12-17 ENCOUNTER — TELEPHONE (OUTPATIENT)
Dept: MAMMOGRAPHY | Facility: HOSPITAL | Age: 46
End: 2021-12-17

## 2021-12-17 ENCOUNTER — TELEPHONE (OUTPATIENT)
Dept: INTERNAL MEDICINE | Facility: CLINIC | Age: 46
End: 2021-12-17

## 2021-12-17 NOTE — TELEPHONE ENCOUNTER
KIMBERLEY FROM [facility] CALLED AND STATED PT DOES HAVE BREAST CANCER. SHE IS AWARE, AND WANTED DR. HIGH TO BE AWARE.    SHE HAS RECEIVED THE PACKETS, AND KIMBERLEY IS SCHEDULING HER FOLLOW UP APPOINTMENTS.     IF YOU NEED TO SPEAK WITH KIMBERLEY, HER PHONE NUMBER [phone].

## 2021-12-17 NOTE — TELEPHONE ENCOUNTER
Referring provider's office notified pathology & cytology returned as cancer & patient has been notified.   Patient called in having seen pathology & cytology results; notified of recommendation. Verbalizes understanding. Denies discomfort. Denies signs and symptoms of infection.   Patient previously requested surgical consult with Dr Barahona. Patient notified of appointment on 12.22.21 @ 2895. Patient verbalized understanding.  Patient given office contact & location information. Told to bring photo ID, list of prescription & OTC medications, insurance information, must wear a mask & can bring one person for support also wearing a mask.    Briefly reviewed what would be discussed at surgical consult visit, treatment options & pros/cons, availability of Breast Nurse Navigator. Patient encouraged to call back or contact Breast Nurse Navigator, with any questions or concerns. Patient information sent to Genetics/Navigator pool for evaluation & possible referral for genetic counseling.

## 2021-12-21 NOTE — PROGRESS NOTES
"  Subjective     PROBLEM LIST:  1. [id number] ER+ ME+ Her2 negative invasive ductal carcinoma of the right breast  A) right breast biopsy of 1.3 cm mass on 12/13/21 showed a grade 2 IDC.  Biopsy of right axillary LN malignant.    CHIEF COMPLAINT: breast cancer      HISTORY OF PRESENT ILLNESS:  The patient is a 46 y.o. female, referred for evaluation of a recently diagnosed breast cancer.    She presented with an abnormality on screening mammogram.  She says that she had noticed something under her right arm, but everyone at work was sick around that time so she didn't think much about it initially.    She lives in [place] with her .  She is a  and does contract work.  She has 2 sons ages 17 and 13.    Premenopausal.  S/p BTL    She has a family history of breast cancer in her maternal grandmother, maternal 1st cousin, and DCIS in mom    REVIEW OF SYSTEMS:  A 14 point review of systems was performed and is negative except as noted above.    Past Medical History:   Diagnosis Date   • Acute sinusitis    • Allergic    • Arthritis    • JRA (juvenile rheumatoid arthritis) ([facility])    • Malignant neoplasm of right breast in female, estrogen receptor positive (HCC) 12/22/2021   • Seizure disorder ([facility])    • UTI (urinary tract infection)     Last Impression: 02 Nov 2014  Send urine for culture. Treat with cipro until results are  available.  Almaz Cavazos (Internal Medicine)               Objective     /69   Pulse 68   Temp 96.9 °F (36.1 °C) (Temporal)   Resp 16   Ht 165.1 cm (65\")   Wt 58.1 kg (128 lb)   SpO2 99%   BMI 21.30 kg/m²   Performance Status:0            General: well appearing female in no acute distress  Neuro: alert and oriented  HEENT: sclerae anicteric  Skin: no rashes, lesions, bruising, or petechiae  Psych: mood and affect appropriate    Lab Results   Component Value Date    WBC 6.1 11/22/2021    HGB 13.6 11/22/2021    HCT 41.4 11/22/2021    MCV 91 11/22/2021     " 11/22/2021     Lab Results   Component Value Date    GLUCOSE 83 11/22/2021    BUN 14 11/22/2021    CREATININE 0.71 11/22/2021    EGFRIFNONA 102 11/22/2021    EGFRIFAFRI 117 11/22/2021    BCR 20 11/22/2021    K 4.6 11/22/2021    CO2 23 11/22/2021    CALCIUM 9.2 11/22/2021    PROTENTOTREF 6.7 11/22/2021    ALBUMIN 4.6 11/22/2021    LABIL2 2.2 11/22/2021    AST 15 11/22/2021    ALT 10 11/22/2021                 Assessment/Plan     Celsa Velez is a 46 y.o. female with a [id number] ER+ Her2 negative IDC of the right breast.    I will order staging studies given evidence of vaibhav involvement to rule out distant disease.  If there is no evidence of distant disease, then I would recommend that she proceed with surgery.  We discussed that from a cancer recurrence standpoint, there is no advantage to mastectomy over lumpectomy, but mastectomy may be chosen for other reasons.  At this point she is leaning towards bilateral mastectomy with reconstruction.    Premenopausal patients appear to benefit from the use of adjuvant chemotherapy, regardless of oncotype score.   I told her that I likely will recommend adjuvant chemotherapy.  The exact regimen may depend on the final pathology, but we briefly discussed with Taxotere and cyclophosphamide, as well as Adriamycin, Cytoxan, and Taxol.    Following chemotherapy she is a candidate for adjuvant radiotherapy given node involvement.    We briefly discussed adjuvant endocrine therapy.  She asked about starting this now awaiting for other things to occur.  Because she is premenopausal, tamoxifen would be the initial drug of choice.  Since we generally stop tamoxifen 2 weeks before any surgical procedure, I do not think there is an advantage to starting this medication at this time.  In the longer term we can discuss ovarian suppression along with an aromatase inhibitor, as well as adjuvant abemaciclib depending on final pathology.  I will order a Ki-67 on her tumor.    We will  contact her with scan results when they are available.  Otherwise likely follow-up after surgery.         A total greater than 60 mins minutes was spent in face to face patient time, examination, counseling, charting, reviewing test results, and reviewing outside records.    Tana Alarcon MD    12/22/2021

## 2021-12-22 ENCOUNTER — CONSULT (OUTPATIENT)
Dept: ONCOLOGY | Facility: CLINIC | Age: 46
End: 2021-12-22

## 2021-12-22 ENCOUNTER — NURSE NAVIGATOR (OUTPATIENT)
Dept: OTHER | Facility: HOSPITAL | Age: 46
End: 2021-12-22

## 2021-12-22 VITALS
DIASTOLIC BLOOD PRESSURE: 69 MMHG | HEART RATE: 68 BPM | RESPIRATION RATE: 16 BRPM | SYSTOLIC BLOOD PRESSURE: 100 MMHG | TEMPERATURE: 96.9 F | OXYGEN SATURATION: 99 % | HEIGHT: 65 IN | BODY MASS INDEX: 21.33 KG/M2 | WEIGHT: 128 LBS

## 2021-12-22 DIAGNOSIS — Z17.0 MALIGNANT NEOPLASM OF NIPPLE OF RIGHT BREAST IN FEMALE, ESTROGEN RECEPTOR POSITIVE (HCC): Primary | ICD-10-CM

## 2021-12-22 DIAGNOSIS — C50.011 MALIGNANT NEOPLASM OF NIPPLE OF RIGHT BREAST IN FEMALE, ESTROGEN RECEPTOR POSITIVE (HCC): Primary | ICD-10-CM

## 2021-12-22 PROBLEM — C50.911 MALIGNANT NEOPLASM OF RIGHT BREAST IN FEMALE, ESTROGEN RECEPTOR POSITIVE (HCC): Status: ACTIVE | Noted: 2021-12-22

## 2021-12-22 PROCEDURE — 99205 OFFICE O/P NEW HI 60 MIN: CPT | Performed by: INTERNAL MEDICINE

## 2021-12-23 ENCOUNTER — PATIENT ROUNDING (BHMG ONLY) (OUTPATIENT)
Dept: ONCOLOGY | Facility: CLINIC | Age: 46
End: 2021-12-23

## 2021-12-23 DIAGNOSIS — C50.011 MALIGNANT NEOPLASM OF NIPPLE OF RIGHT BREAST IN FEMALE, ESTROGEN RECEPTOR POSITIVE (HCC): Primary | ICD-10-CM

## 2021-12-23 DIAGNOSIS — Z17.0 MALIGNANT NEOPLASM OF NIPPLE OF RIGHT BREAST IN FEMALE, ESTROGEN RECEPTOR POSITIVE (HCC): Primary | ICD-10-CM

## 2021-12-23 NOTE — PROGRESS NOTES
A My-Chart message has been sent to the patient for PATIENT ROUNDING with [facility].

## 2021-12-27 ENCOUNTER — TELEPHONE (OUTPATIENT)
Dept: INTERNAL MEDICINE | Facility: CLINIC | Age: 46
End: 2021-12-27

## 2021-12-27 NOTE — TELEPHONE ENCOUNTER
Caller: Annabel Velez    Relationship: Self    Best call back number: [phone]    Who are you requesting to speak with (clinical staff, provider,  specific staff member):CLINICAL STAFF    What was the call regarding:   ANNABEL WOULD LIKE TO KNOW IF A PRIOR AUTHORIZATION HAS BEEN SENT IN FOR HER UPCOMING PET SCAN.      Do you require a callback: YES

## 2021-12-27 NOTE — TELEPHONE ENCOUNTER
"HUB TO READ:  \"Pt scan was ordered by Dr. Alarcon's ofc; Central scheduling handles auth's.  Will need to call them:  Ph:  [phone]\"  "

## 2021-12-28 ENCOUNTER — TELEPHONE (OUTPATIENT)
Dept: ONCOLOGY | Facility: CLINIC | Age: 46
End: 2021-12-28

## 2021-12-28 ENCOUNTER — NURSE NAVIGATOR (OUTPATIENT)
Dept: OTHER | Facility: HOSPITAL | Age: 46
End: 2021-12-28

## 2021-12-28 ENCOUNTER — HOSPITAL ENCOUNTER (OUTPATIENT)
Dept: PET IMAGING | Facility: HOSPITAL | Age: 46
Discharge: HOME | End: 2021-12-28

## 2021-12-28 DIAGNOSIS — C50.011 MALIGNANT NEOPLASM OF NIPPLE OF RIGHT BREAST IN FEMALE, ESTROGEN RECEPTOR POSITIVE (HCC): ICD-10-CM

## 2021-12-28 DIAGNOSIS — Z17.0 MALIGNANT NEOPLASM OF NIPPLE OF RIGHT BREAST IN FEMALE, ESTROGEN RECEPTOR POSITIVE (HCC): ICD-10-CM

## 2021-12-28 LAB — GLUCOSE BLDC GLUCOMTR-MCNC: 78 MG/DL (ref 70–130)

## 2021-12-28 PROCEDURE — 0 FLUDEOXYGLUCOSE F18 SOLUTION: Performed by: INTERNAL MEDICINE

## 2021-12-28 PROCEDURE — A9552 F18 FDG: HCPCS | Performed by: INTERNAL MEDICINE

## 2021-12-28 PROCEDURE — 82962 GLUCOSE BLOOD TEST: CPT

## 2021-12-28 PROCEDURE — 78815 PET IMAGE W/CT SKULL-THIGH: CPT

## 2021-12-28 RX ADMIN — FLUDEOXYGLUCOSE F18 1 DOSE: 300 INJECTION INTRAVENOUS at 14:26

## 2021-12-28 NOTE — TELEPHONE ENCOUNTER
I called the patient as I had found out her pet was denied by insurance and wanted to offer her that Dr. Alarcon would change to CT and Bone scans instead and that insurance would probably approve those.  I had to leave voicemail for patient.  Her PET is scheduled today and patient told scheduling she was willing to pay for it.  I asked her to call me back before she went to her PET.

## 2021-12-28 NOTE — PROGRESS NOTES
Patient called left message to ask when her surgery would possibly be after she sees Dr Tim Brown on 1/12/2022. I returned her call and left her my number.

## 2021-12-28 NOTE — PROGRESS NOTES
Patient returned call to say that she is going to proceed with PET scan - they are waiting for approval from insurance pending her PCP's order - if this does not get approved with PCP order she has opted to proceed with PET and pay out of pocket. She states that she wants to have surgery (bilateral mastectomies with immediate reconstruction as soon as she can) she has appointment to see Dr Tim Brown on 1/12/2021. I called to see if that appointment could be moved up but it cannot according to the  at [facility]

## 2021-12-29 ENCOUNTER — TELEPHONE (OUTPATIENT)
Dept: INTERNAL MEDICINE | Facility: CLINIC | Age: 46
End: 2021-12-29

## 2021-12-29 ENCOUNTER — TELEPHONE (OUTPATIENT)
Dept: ONCOLOGY | Facility: CLINIC | Age: 46
End: 2021-12-29

## 2021-12-29 NOTE — TELEPHONE ENCOUNTER
I called patient per Dr.. Alarcon to tell her that the PET showed axillary node involvement but no areas of distant disease.  I had to leave voice mail as patient did not answer.

## 2021-12-29 NOTE — TELEPHONE ENCOUNTER
----- Message from Tana Alarcon MD sent at 12/28/2021  8:25 PM EST -----  Regarding: FW:  Please let her know pet scan shows axillary lymph node involvement but no distant metastatic disease  ----- Message -----  From: Interface, Rad Results [place] In  Sent: 12/28/2021   4:08 PM CST  To: Tana Alarcon MD

## 2022-01-03 ENCOUNTER — NURSE NAVIGATOR (OUTPATIENT)
Dept: OTHER | Facility: HOSPITAL | Age: 47
End: 2022-01-03

## 2022-01-03 NOTE — PROGRESS NOTES
I called patient to review PET scan result - she said that Dr Alarcon's office had called her - she did have a question about lymph node - we reviewed SLN mapping - patient verbalized understanding

## 2022-01-11 LAB — REF LAB TEST RESULTS: NORMAL

## 2022-01-17 ENCOUNTER — TRANSCRIBE ORDERS (OUTPATIENT)
Dept: ADMINISTRATIVE | Facility: HOSPITAL | Age: 47
End: 2022-01-17

## 2022-01-17 DIAGNOSIS — C50.211 MALIGNANT NEOPLASM OF UPPER-INNER QUADRANT OF RIGHT FEMALE BREAST, UNSPECIFIED ESTROGEN RECEPTOR STATUS: Primary | ICD-10-CM

## 2022-02-01 ENCOUNTER — PRE-ADMISSION TESTING (OUTPATIENT)
Dept: PREADMISSION TESTING | Facility: HOSPITAL | Age: 47
End: 2022-02-01

## 2022-02-01 VITALS — BODY MASS INDEX: 20.88 KG/M2 | WEIGHT: 125.33 LBS | HEIGHT: 65 IN

## 2022-02-01 DIAGNOSIS — Z01.89 LABORATORY TEST: Primary | ICD-10-CM

## 2022-02-01 LAB
ALBUMIN SERPL-MCNC: 4.6 G/DL (ref 3.5–5.2)
ALBUMIN/GLOB SERPL: 2.4 G/DL
ALP SERPL-CCNC: 42 U/L (ref 39–117)
ALT SERPL W P-5'-P-CCNC: 10 U/L (ref 1–33)
ANION GAP SERPL CALCULATED.3IONS-SCNC: 11 MMOL/L (ref 5–15)
AST SERPL-CCNC: 16 U/L (ref 1–32)
BILIRUB SERPL-MCNC: 0.4 MG/DL (ref 0–1.2)
BUN SERPL-MCNC: 16 MG/DL (ref 6–20)
BUN/CREAT SERPL: 20.8 (ref 7–25)
CALCIUM SPEC-SCNC: 9.3 MG/DL (ref 8.6–10.5)
CHLORIDE SERPL-SCNC: 103 MMOL/L (ref 98–107)
CO2 SERPL-SCNC: 24 MMOL/L (ref 22–29)
CREAT SERPL-MCNC: 0.77 MG/DL (ref 0.57–1)
DEPRECATED RDW RBC AUTO: 40.6 FL (ref 37–54)
ERYTHROCYTE [DISTWIDTH] IN BLOOD BY AUTOMATED COUNT: 12.3 % (ref 12.3–15.4)
GFR SERPL CREATININE-BSD FRML MDRD: 80 ML/MIN/1.73
GLOBULIN UR ELPH-MCNC: 1.9 GM/DL
GLUCOSE SERPL-MCNC: 117 MG/DL (ref 65–99)
HBA1C MFR BLD: 4.9 % (ref 4.8–5.6)
HCT VFR BLD AUTO: 39.6 % (ref 34–46.6)
HGB BLD-MCNC: 13.4 G/DL (ref 12–15.9)
MCH RBC QN AUTO: 30.1 PG (ref 26.6–33)
MCHC RBC AUTO-ENTMCNC: 33.8 G/DL (ref 31.5–35.7)
MCV RBC AUTO: 89 FL (ref 79–97)
PLATELET # BLD AUTO: 202 10*3/MM3 (ref 140–450)
PMV BLD AUTO: 10.9 FL (ref 6–12)
POTASSIUM SERPL-SCNC: 4.2 MMOL/L (ref 3.5–5.2)
PROT SERPL-MCNC: 6.5 G/DL (ref 6–8.5)
RBC # BLD AUTO: 4.45 10*6/MM3 (ref 3.77–5.28)
SODIUM SERPL-SCNC: 138 MMOL/L (ref 136–145)
WBC NRBC COR # BLD: 6.22 10*3/MM3 (ref 3.4–10.8)

## 2022-02-01 PROCEDURE — 83036 HEMOGLOBIN GLYCOSYLATED A1C: CPT

## 2022-02-01 PROCEDURE — 86900 BLOOD TYPING SEROLOGIC ABO: CPT

## 2022-02-01 PROCEDURE — 86901 BLOOD TYPING SEROLOGIC RH(D): CPT

## 2022-02-01 PROCEDURE — 80053 COMPREHEN METABOLIC PANEL: CPT

## 2022-02-01 PROCEDURE — 36415 COLL VENOUS BLD VENIPUNCTURE: CPT

## 2022-02-01 PROCEDURE — 85027 COMPLETE CBC AUTOMATED: CPT

## 2022-02-01 RX ORDER — LEVOCETIRIZINE DIHYDROCHLORIDE 5 MG/1
5 TABLET, FILM COATED ORAL EVERY EVENING
COMMUNITY

## 2022-02-01 RX ORDER — MULTIVITAMIN WITH IRON
100 TABLET ORAL DAILY
COMMUNITY
End: 2022-09-06

## 2022-02-01 RX ORDER — OMEGA-3/DHA/EPA/FISH OIL 300-1000MG
2 CAPSULE ORAL DAILY
COMMUNITY
End: 2022-09-06

## 2022-02-01 RX ORDER — MAGNESIUM 200 MG
1 TABLET ORAL DAILY
COMMUNITY
End: 2022-09-06

## 2022-02-02 LAB
ABO GROUP BLD: NORMAL
RH BLD: NEGATIVE

## 2022-02-06 ENCOUNTER — APPOINTMENT (OUTPATIENT)
Dept: PREADMISSION TESTING | Facility: HOSPITAL | Age: 47
End: 2022-02-06

## 2022-02-06 LAB — SARS-COV-2 RNA PNL SPEC NAA+PROBE: NOT DETECTED

## 2022-02-06 PROCEDURE — U0004 COV-19 TEST NON-CDC HGH THRU: HCPCS

## 2022-02-06 PROCEDURE — C9803 HOPD COVID-19 SPEC COLLECT: HCPCS

## 2022-02-08 ENCOUNTER — ANESTHESIA EVENT CONVERTED (OUTPATIENT)
Dept: ANESTHESIOLOGY | Facility: HOSPITAL | Age: 47
End: 2022-02-08

## 2022-02-08 ENCOUNTER — ANESTHESIA EVENT (OUTPATIENT)
Dept: PERIOP | Facility: HOSPITAL | Age: 47
End: 2022-02-08

## 2022-02-08 ENCOUNTER — APPOINTMENT (OUTPATIENT)
Dept: NUCLEAR MEDICINE | Facility: HOSPITAL | Age: 47
End: 2022-02-08

## 2022-02-08 ENCOUNTER — ANESTHESIA (OUTPATIENT)
Dept: PERIOP | Facility: HOSPITAL | Age: 47
End: 2022-02-08

## 2022-02-08 ENCOUNTER — HOSPITAL ENCOUNTER (OUTPATIENT)
Facility: HOSPITAL | Age: 47
Discharge: HOME | End: 2022-02-09
Attending: SURGERY | Admitting: SURGERY

## 2022-02-08 DIAGNOSIS — C50.919 BREAST CANCER: ICD-10-CM

## 2022-02-08 PROBLEM — C50.211 MALIGNANT NEOPLASM OF UPPER-INNER QUADRANT OF RIGHT FEMALE BREAST (HCC): Status: ACTIVE | Noted: 2022-02-08

## 2022-02-08 LAB
B-HCG UR QL: NEGATIVE
EXPIRATION DATE: NORMAL
INTERNAL NEGATIVE CONTROL: NORMAL
INTERNAL POSITIVE CONTROL: NORMAL
Lab: NORMAL

## 2022-02-08 PROCEDURE — 25010000002 PROPOFOL 10 MG/ML EMULSION

## 2022-02-08 PROCEDURE — 25010000002 HYDROMORPHONE PER 4 MG: Performed by: NURSE ANESTHETIST, CERTIFIED REGISTERED

## 2022-02-08 PROCEDURE — 25010000002 NEOSTIGMINE 10 MG/10ML SOLUTION: Performed by: NURSE ANESTHETIST, CERTIFIED REGISTERED

## 2022-02-08 PROCEDURE — C1889 IMPLANT/INSERT DEVICE, NOC: HCPCS | Performed by: SURGERY

## 2022-02-08 PROCEDURE — 88341 IMHCHEM/IMCYTCHM EA ADD ANTB: CPT | Performed by: SURGERY

## 2022-02-08 PROCEDURE — 25010000002 ONDANSETRON PER 1 MG: Performed by: NURSE ANESTHETIST, CERTIFIED REGISTERED

## 2022-02-08 PROCEDURE — 19307 MAST MOD RAD: CPT | Performed by: PHYSICIAN ASSISTANT

## 2022-02-08 PROCEDURE — 0 CEFAZOLIN IN DEXTROSE 2-4 GM/100ML-% SOLUTION: Performed by: SURGERY

## 2022-02-08 PROCEDURE — 19303 MAST SIMPLE COMPLETE: CPT | Performed by: PHYSICIAN ASSISTANT

## 2022-02-08 PROCEDURE — 88342 IMHCHEM/IMCYTCHM 1ST ANTB: CPT | Performed by: SURGERY

## 2022-02-08 PROCEDURE — 25010000002 DEXAMETHASONE PER 1 MG

## 2022-02-08 PROCEDURE — 88307 TISSUE EXAM BY PATHOLOGIST: CPT | Performed by: SURGERY

## 2022-02-08 PROCEDURE — 25010000002 FENTANYL CITRATE (PF) 50 MCG/ML SOLUTION: Performed by: NURSE ANESTHETIST, CERTIFIED REGISTERED

## 2022-02-08 PROCEDURE — 25010000002 DEXAMETHASONE SODIUM PHOSPHATE 10 MG/ML SOLUTION: Performed by: NURSE ANESTHETIST, CERTIFIED REGISTERED

## 2022-02-08 PROCEDURE — C1789 PROSTHESIS, BREAST, IMP: HCPCS | Performed by: SURGERY

## 2022-02-08 PROCEDURE — 0 MEPERIDINE PER 100 MG: Performed by: NURSE ANESTHETIST, CERTIFIED REGISTERED

## 2022-02-08 PROCEDURE — 81025 URINE PREGNANCY TEST: CPT | Performed by: ANESTHESIOLOGY

## 2022-02-08 PROCEDURE — 25010000002 FENTANYL CITRATE (PF) 50 MCG/ML SOLUTION

## 2022-02-08 DEVICE — EXPNDR TISS BRST F/HT XPROJ STL 133S FX/T 350CC: Type: IMPLANTABLE DEVICE | Site: BREAST | Status: FUNCTIONAL

## 2022-02-08 DEVICE — GRFT TISS ALLODERM RTM PERF 16X20CM 2.4MM/THK .4MM: Type: IMPLANTABLE DEVICE | Site: BREAST | Status: FUNCTIONAL

## 2022-02-08 DEVICE — DEV CONTRL TISS STRATAFIX SPIRAL MNCRYL UD 3/0 PLS 30CM: Type: IMPLANTABLE DEVICE | Site: BREAST | Status: FUNCTIONAL

## 2022-02-08 RX ORDER — CEFAZOLIN SODIUM 2 G/100ML
2 INJECTION, SOLUTION INTRAVENOUS ONCE
Status: COMPLETED | OUTPATIENT
Start: 2022-02-08 | End: 2022-02-08

## 2022-02-08 RX ORDER — SODIUM CHLORIDE, SODIUM LACTATE, POTASSIUM CHLORIDE, CALCIUM CHLORIDE 600; 310; 30; 20 MG/100ML; MG/100ML; MG/100ML; MG/100ML
9 INJECTION, SOLUTION INTRAVENOUS CONTINUOUS PRN
Status: DISCONTINUED | OUTPATIENT
Start: 2022-02-08 | End: 2022-02-09 | Stop reason: HOSPADM

## 2022-02-08 RX ORDER — SODIUM CHLORIDE 0.9 % (FLUSH) 0.9 %
10 SYRINGE (ML) INJECTION EVERY 12 HOURS SCHEDULED
Status: DISCONTINUED | OUTPATIENT
Start: 2022-02-08 | End: 2022-02-08 | Stop reason: HOSPADM

## 2022-02-08 RX ORDER — FENTANYL CITRATE 50 UG/ML
INJECTION, SOLUTION INTRAMUSCULAR; INTRAVENOUS
Status: DISPENSED
Start: 2022-02-08 | End: 2022-02-09

## 2022-02-08 RX ORDER — ROCURONIUM BROMIDE 10 MG/ML
INJECTION, SOLUTION INTRAVENOUS AS NEEDED
Status: DISCONTINUED | OUTPATIENT
Start: 2022-02-08 | End: 2022-02-08 | Stop reason: SURG

## 2022-02-08 RX ORDER — PROMETHAZINE HYDROCHLORIDE 25 MG/1
25 TABLET ORAL ONCE AS NEEDED
Status: DISCONTINUED | OUTPATIENT
Start: 2022-02-08 | End: 2022-02-08 | Stop reason: HOSPADM

## 2022-02-08 RX ORDER — HYDRALAZINE HYDROCHLORIDE 20 MG/ML
5 INJECTION INTRAMUSCULAR; INTRAVENOUS
Status: DISCONTINUED | OUTPATIENT
Start: 2022-02-08 | End: 2022-02-08 | Stop reason: HOSPADM

## 2022-02-08 RX ORDER — LIDOCAINE HYDROCHLORIDE 10 MG/ML
0.5 INJECTION, SOLUTION EPIDURAL; INFILTRATION; INTRACAUDAL; PERINEURAL ONCE AS NEEDED
Status: DISCONTINUED | OUTPATIENT
Start: 2022-02-08 | End: 2022-02-08 | Stop reason: HOSPADM

## 2022-02-08 RX ORDER — PROPOFOL 10 MG/ML
VIAL (ML) INTRAVENOUS AS NEEDED
Status: DISCONTINUED | OUTPATIENT
Start: 2022-02-08 | End: 2022-02-08 | Stop reason: SURG

## 2022-02-08 RX ORDER — ONDANSETRON 2 MG/ML
4 INJECTION INTRAMUSCULAR; INTRAVENOUS ONCE AS NEEDED
Status: DISCONTINUED | OUTPATIENT
Start: 2022-02-08 | End: 2022-02-08 | Stop reason: HOSPADM

## 2022-02-08 RX ORDER — BUPIVACAINE HYDROCHLORIDE 2.5 MG/ML
INJECTION, SOLUTION EPIDURAL; INFILTRATION; INTRACAUDAL
Status: COMPLETED | OUTPATIENT
Start: 2022-02-08 | End: 2022-02-08

## 2022-02-08 RX ORDER — DEXAMETHASONE SODIUM PHOSPHATE 10 MG/ML
INJECTION INTRAMUSCULAR; INTRAVENOUS AS NEEDED
Status: DISCONTINUED | OUTPATIENT
Start: 2022-02-08 | End: 2022-02-08 | Stop reason: SURG

## 2022-02-08 RX ORDER — CEFAZOLIN SODIUM 2 G/100ML
2 INJECTION, SOLUTION INTRAVENOUS EVERY 8 HOURS
Status: DISCONTINUED | OUTPATIENT
Start: 2022-02-08 | End: 2022-02-09 | Stop reason: HOSPADM

## 2022-02-08 RX ORDER — FAMOTIDINE 10 MG/ML
20 INJECTION, SOLUTION INTRAVENOUS ONCE
Status: DISCONTINUED | OUTPATIENT
Start: 2022-02-08 | End: 2022-02-08 | Stop reason: HOSPADM

## 2022-02-08 RX ORDER — PREGABALIN 75 MG/1
75 CAPSULE ORAL ONCE
Status: COMPLETED | OUTPATIENT
Start: 2022-02-08 | End: 2022-02-08

## 2022-02-08 RX ORDER — DIPHENHYDRAMINE HYDROCHLORIDE 50 MG/ML
12.5 INJECTION INTRAMUSCULAR; INTRAVENOUS EVERY 6 HOURS PRN
Status: DISCONTINUED | OUTPATIENT
Start: 2022-02-08 | End: 2022-02-09 | Stop reason: HOSPADM

## 2022-02-08 RX ORDER — SODIUM CHLORIDE 0.9 % (FLUSH) 0.9 %
3-10 SYRINGE (ML) INJECTION AS NEEDED
Status: DISCONTINUED | OUTPATIENT
Start: 2022-02-08 | End: 2022-02-08 | Stop reason: HOSPADM

## 2022-02-08 RX ORDER — MAGNESIUM HYDROXIDE 1200 MG/15ML
LIQUID ORAL AS NEEDED
Status: DISCONTINUED | OUTPATIENT
Start: 2022-02-08 | End: 2022-02-08 | Stop reason: HOSPADM

## 2022-02-08 RX ORDER — LIDOCAINE HYDROCHLORIDE 20 MG/ML
INJECTION, SOLUTION INFILTRATION; PERINEURAL AS NEEDED
Status: DISCONTINUED | OUTPATIENT
Start: 2022-02-08 | End: 2022-02-08 | Stop reason: SURG

## 2022-02-08 RX ORDER — PROMETHAZINE HYDROCHLORIDE 25 MG/1
25 SUPPOSITORY RECTAL ONCE AS NEEDED
Status: DISCONTINUED | OUTPATIENT
Start: 2022-02-08 | End: 2022-02-08 | Stop reason: HOSPADM

## 2022-02-08 RX ORDER — MEPERIDINE HYDROCHLORIDE 25 MG/ML
12.5 INJECTION INTRAMUSCULAR; INTRAVENOUS; SUBCUTANEOUS
Status: DISCONTINUED | OUTPATIENT
Start: 2022-02-08 | End: 2022-02-08 | Stop reason: HOSPADM

## 2022-02-08 RX ORDER — PROMETHAZINE HYDROCHLORIDE 12.5 MG/1
6.25 TABLET ORAL EVERY 6 HOURS PRN
Status: DISCONTINUED | OUTPATIENT
Start: 2022-02-08 | End: 2022-02-09 | Stop reason: HOSPADM

## 2022-02-08 RX ORDER — ACETAMINOPHEN 500 MG
1000 TABLET ORAL ONCE
Status: COMPLETED | OUTPATIENT
Start: 2022-02-08 | End: 2022-02-08

## 2022-02-08 RX ORDER — OXYCODONE HYDROCHLORIDE 5 MG/1
5 TABLET ORAL EVERY 4 HOURS PRN
Status: DISCONTINUED | OUTPATIENT
Start: 2022-02-08 | End: 2022-02-09 | Stop reason: HOSPADM

## 2022-02-08 RX ORDER — BUPIVACAINE HCL/0.9 % NACL/PF 0.125 %
PLASTIC BAG, INJECTION (ML) EPIDURAL AS NEEDED
Status: DISCONTINUED | OUTPATIENT
Start: 2022-02-08 | End: 2022-02-08 | Stop reason: SURG

## 2022-02-08 RX ORDER — FAMOTIDINE 20 MG/1
20 TABLET, FILM COATED ORAL ONCE
Status: DISCONTINUED | OUTPATIENT
Start: 2022-02-08 | End: 2022-02-08 | Stop reason: HOSPADM

## 2022-02-08 RX ORDER — DROPERIDOL 2.5 MG/ML
0.62 INJECTION, SOLUTION INTRAMUSCULAR; INTRAVENOUS ONCE AS NEEDED
Status: DISCONTINUED | OUTPATIENT
Start: 2022-02-08 | End: 2022-02-08 | Stop reason: HOSPADM

## 2022-02-08 RX ORDER — NALOXONE HCL 0.4 MG/ML
0.4 VIAL (ML) INJECTION AS NEEDED
Status: DISCONTINUED | OUTPATIENT
Start: 2022-02-08 | End: 2022-02-08 | Stop reason: HOSPADM

## 2022-02-08 RX ORDER — MELOXICAM 7.5 MG/1
15 TABLET ORAL DAILY
Status: DISCONTINUED | OUTPATIENT
Start: 2022-02-09 | End: 2022-02-09 | Stop reason: HOSPADM

## 2022-02-08 RX ORDER — MELOXICAM 15 MG/1
15 TABLET ORAL ONCE
Status: COMPLETED | OUTPATIENT
Start: 2022-02-08 | End: 2022-02-08

## 2022-02-08 RX ORDER — LABETALOL HYDROCHLORIDE 5 MG/ML
5 INJECTION, SOLUTION INTRAVENOUS
Status: DISCONTINUED | OUTPATIENT
Start: 2022-02-08 | End: 2022-02-08 | Stop reason: HOSPADM

## 2022-02-08 RX ORDER — GLYCOPYRROLATE 0.2 MG/ML
INJECTION INTRAMUSCULAR; INTRAVENOUS AS NEEDED
Status: DISCONTINUED | OUTPATIENT
Start: 2022-02-08 | End: 2022-02-08 | Stop reason: SURG

## 2022-02-08 RX ORDER — FENTANYL CITRATE 50 UG/ML
INJECTION, SOLUTION INTRAMUSCULAR; INTRAVENOUS AS NEEDED
Status: DISCONTINUED | OUTPATIENT
Start: 2022-02-08 | End: 2022-02-08 | Stop reason: SURG

## 2022-02-08 RX ORDER — HYDROMORPHONE HYDROCHLORIDE 1 MG/ML
0.5 INJECTION, SOLUTION INTRAMUSCULAR; INTRAVENOUS; SUBCUTANEOUS
Status: DISCONTINUED | OUTPATIENT
Start: 2022-02-08 | End: 2022-02-08 | Stop reason: HOSPADM

## 2022-02-08 RX ORDER — IPRATROPIUM BROMIDE AND ALBUTEROL SULFATE 2.5; .5 MG/3ML; MG/3ML
3 SOLUTION RESPIRATORY (INHALATION) ONCE AS NEEDED
Status: DISCONTINUED | OUTPATIENT
Start: 2022-02-08 | End: 2022-02-08 | Stop reason: HOSPADM

## 2022-02-08 RX ORDER — MEPERIDINE HYDROCHLORIDE 25 MG/ML
INJECTION INTRAMUSCULAR; INTRAVENOUS; SUBCUTANEOUS
Status: DISPENSED
Start: 2022-02-08 | End: 2022-02-09

## 2022-02-08 RX ORDER — SODIUM CHLORIDE 0.9 % (FLUSH) 0.9 %
10 SYRINGE (ML) INJECTION AS NEEDED
Status: DISCONTINUED | OUTPATIENT
Start: 2022-02-08 | End: 2022-02-08 | Stop reason: HOSPADM

## 2022-02-08 RX ORDER — PROMETHAZINE HYDROCHLORIDE 12.5 MG/1
6.25 SUPPOSITORY RECTAL EVERY 6 HOURS PRN
Status: DISCONTINUED | OUTPATIENT
Start: 2022-02-08 | End: 2022-02-09 | Stop reason: HOSPADM

## 2022-02-08 RX ORDER — NALOXONE HCL 0.4 MG/ML
0.1 VIAL (ML) INJECTION
Status: DISCONTINUED | OUTPATIENT
Start: 2022-02-08 | End: 2022-02-09 | Stop reason: HOSPADM

## 2022-02-08 RX ORDER — ONDANSETRON 2 MG/ML
INJECTION INTRAMUSCULAR; INTRAVENOUS AS NEEDED
Status: DISCONTINUED | OUTPATIENT
Start: 2022-02-08 | End: 2022-02-08 | Stop reason: SURG

## 2022-02-08 RX ORDER — MIDAZOLAM HYDROCHLORIDE 1 MG/ML
1 INJECTION INTRAMUSCULAR; INTRAVENOUS
Status: DISCONTINUED | OUTPATIENT
Start: 2022-02-08 | End: 2022-02-08 | Stop reason: HOSPADM

## 2022-02-08 RX ORDER — SCOLOPAMINE TRANSDERMAL SYSTEM 1 MG/1
1 PATCH, EXTENDED RELEASE TRANSDERMAL CONTINUOUS
Status: DISCONTINUED | OUTPATIENT
Start: 2022-02-08 | End: 2022-02-09 | Stop reason: HOSPADM

## 2022-02-08 RX ORDER — DEXAMETHASONE SODIUM PHOSPHATE 10 MG/ML
INJECTION, SOLUTION INTRAMUSCULAR; INTRAVENOUS
Status: COMPLETED | OUTPATIENT
Start: 2022-02-08 | End: 2022-02-08

## 2022-02-08 RX ORDER — ONDANSETRON 2 MG/ML
4 INJECTION INTRAMUSCULAR; INTRAVENOUS EVERY 6 HOURS PRN
Status: DISCONTINUED | OUTPATIENT
Start: 2022-02-08 | End: 2022-02-09 | Stop reason: HOSPADM

## 2022-02-08 RX ORDER — DIAZEPAM 2 MG/1
2 TABLET ORAL EVERY 8 HOURS PRN
Status: DISCONTINUED | OUTPATIENT
Start: 2022-02-08 | End: 2022-02-09 | Stop reason: HOSPADM

## 2022-02-08 RX ORDER — SODIUM CHLORIDE 0.9 % (FLUSH) 0.9 %
3 SYRINGE (ML) INJECTION EVERY 12 HOURS SCHEDULED
Status: DISCONTINUED | OUTPATIENT
Start: 2022-02-08 | End: 2022-02-08 | Stop reason: HOSPADM

## 2022-02-08 RX ORDER — SODIUM CHLORIDE 9 MG/ML
INJECTION, SOLUTION INTRAVENOUS AS NEEDED
Status: DISCONTINUED | OUTPATIENT
Start: 2022-02-08 | End: 2022-02-08 | Stop reason: HOSPADM

## 2022-02-08 RX ORDER — DROPERIDOL 2.5 MG/ML
0.62 INJECTION, SOLUTION INTRAMUSCULAR; INTRAVENOUS AS NEEDED
Status: DISCONTINUED | OUTPATIENT
Start: 2022-02-08 | End: 2022-02-08 | Stop reason: HOSPADM

## 2022-02-08 RX ORDER — SODIUM CHLORIDE 9 MG/ML
50 INJECTION, SOLUTION INTRAVENOUS CONTINUOUS
Status: DISCONTINUED | OUTPATIENT
Start: 2022-02-08 | End: 2022-02-09

## 2022-02-08 RX ORDER — HYDROCODONE BITARTRATE AND ACETAMINOPHEN 5; 325 MG/1; MG/1
1 TABLET ORAL ONCE AS NEEDED
Status: DISCONTINUED | OUTPATIENT
Start: 2022-02-08 | End: 2022-02-08 | Stop reason: HOSPADM

## 2022-02-08 RX ORDER — FENTANYL CITRATE 50 UG/ML
50 INJECTION, SOLUTION INTRAMUSCULAR; INTRAVENOUS
Status: DISCONTINUED | OUTPATIENT
Start: 2022-02-08 | End: 2022-02-08 | Stop reason: HOSPADM

## 2022-02-08 RX ORDER — SODIUM CHLORIDE, SODIUM LACTATE, POTASSIUM CHLORIDE, CALCIUM CHLORIDE 600; 310; 30; 20 MG/100ML; MG/100ML; MG/100ML; MG/100ML
9 INJECTION, SOLUTION INTRAVENOUS CONTINUOUS
Status: DISCONTINUED | OUTPATIENT
Start: 2022-02-08 | End: 2022-02-09 | Stop reason: HOSPADM

## 2022-02-08 RX ORDER — FAMOTIDINE 20 MG/1
20 TABLET, FILM COATED ORAL
Status: COMPLETED | OUTPATIENT
Start: 2022-02-08 | End: 2022-02-08

## 2022-02-08 RX ORDER — NEOSTIGMINE METHYLSULFATE 1 MG/ML
INJECTION, SOLUTION INTRAVENOUS AS NEEDED
Status: DISCONTINUED | OUTPATIENT
Start: 2022-02-08 | End: 2022-02-08 | Stop reason: SURG

## 2022-02-08 RX ORDER — ACETAMINOPHEN 500 MG
1000 TABLET ORAL EVERY 6 HOURS SCHEDULED
Status: DISCONTINUED | OUTPATIENT
Start: 2022-02-08 | End: 2022-02-09 | Stop reason: HOSPADM

## 2022-02-08 RX ADMIN — FENTANYL CITRATE 100 MCG: 50 INJECTION, SOLUTION INTRAMUSCULAR; INTRAVENOUS at 12:59

## 2022-02-08 RX ADMIN — PROPOFOL 20 MG: 10 INJECTION, EMULSION INTRAVENOUS at 17:07

## 2022-02-08 RX ADMIN — ACETAMINOPHEN 1000 MG: 500 TABLET ORAL at 21:02

## 2022-02-08 RX ADMIN — FENTANYL CITRATE 50 MCG: 50 INJECTION, SOLUTION INTRAMUSCULAR; INTRAVENOUS at 17:21

## 2022-02-08 RX ADMIN — ACETAMINOPHEN 1000 MG: 500 TABLET ORAL at 10:41

## 2022-02-08 RX ADMIN — DEXAMETHASONE SODIUM PHOSPHATE 4 MG: 10 INJECTION INTRAMUSCULAR; INTRAVENOUS at 13:06

## 2022-02-08 RX ADMIN — FENTANYL CITRATE 50 MCG: 50 INJECTION, SOLUTION INTRAMUSCULAR; INTRAVENOUS at 17:53

## 2022-02-08 RX ADMIN — MEPERIDINE HYDROCHLORIDE 12.5 MG: 25 INJECTION INTRAMUSCULAR; INTRAVENOUS; SUBCUTANEOUS at 18:00

## 2022-02-08 RX ADMIN — CEFAZOLIN SODIUM 2 G: 2 INJECTION, SOLUTION INTRAVENOUS at 13:13

## 2022-02-08 RX ADMIN — MELOXICAM 15 MG: 15 TABLET ORAL at 10:41

## 2022-02-08 RX ADMIN — SODIUM CHLORIDE, POTASSIUM CHLORIDE, SODIUM LACTATE AND CALCIUM CHLORIDE 9 ML/HR: 600; 310; 30; 20 INJECTION, SOLUTION INTRAVENOUS at 10:41

## 2022-02-08 RX ADMIN — NEOSTIGMINE METHYLSULFATE 2 MG: 0.5 INJECTION INTRAVENOUS at 17:13

## 2022-02-08 RX ADMIN — OXYCODONE 5 MG: 5 TABLET ORAL at 21:02

## 2022-02-08 RX ADMIN — DEXAMETHASONE SODIUM PHOSPHATE 4 MG: 10 INJECTION, SOLUTION INTRAMUSCULAR; INTRAVENOUS at 13:06

## 2022-02-08 RX ADMIN — Medication 100 MCG: at 13:17

## 2022-02-08 RX ADMIN — LIDOCAINE HYDROCHLORIDE 50 MG: 20 INJECTION, SOLUTION INFILTRATION; PERINEURAL at 12:58

## 2022-02-08 RX ADMIN — HYDROMORPHONE HYDROCHLORIDE 0.5 MG: 1 INJECTION, SOLUTION INTRAMUSCULAR; INTRAVENOUS; SUBCUTANEOUS at 18:09

## 2022-02-08 RX ADMIN — PREGABALIN 75 MG: 75 CAPSULE ORAL at 10:43

## 2022-02-08 RX ADMIN — HYDROMORPHONE HYDROCHLORIDE 0.5 MG: 1 INJECTION, SOLUTION INTRAMUSCULAR; INTRAVENOUS; SUBCUTANEOUS at 18:23

## 2022-02-08 RX ADMIN — ONDANSETRON 4 MG: 2 INJECTION INTRAMUSCULAR; INTRAVENOUS at 17:02

## 2022-02-08 RX ADMIN — BUPIVACAINE HYDROCHLORIDE 50 ML: 2.5 INJECTION, SOLUTION EPIDURAL; INFILTRATION; INTRACAUDAL at 13:06

## 2022-02-08 RX ADMIN — PROPOFOL 200 MG: 10 INJECTION, EMULSION INTRAVENOUS at 12:58

## 2022-02-08 RX ADMIN — SCOPALAMINE 1 PATCH: 1 PATCH, EXTENDED RELEASE TRANSDERMAL at 10:41

## 2022-02-08 RX ADMIN — CEFAZOLIN SODIUM 2 G: 2 INJECTION, SOLUTION INTRAVENOUS at 21:02

## 2022-02-08 RX ADMIN — SODIUM CHLORIDE, POTASSIUM CHLORIDE, SODIUM LACTATE AND CALCIUM CHLORIDE: 600; 310; 30; 20 INJECTION, SOLUTION INTRAVENOUS at 15:47

## 2022-02-08 RX ADMIN — FAMOTIDINE 20 MG: 20 TABLET ORAL at 10:41

## 2022-02-08 RX ADMIN — GLYCOPYRROLATE 0.2 MG: 0.2 INJECTION INTRAMUSCULAR; INTRAVENOUS at 17:13

## 2022-02-08 RX ADMIN — ROCURONIUM BROMIDE 50 MG: 10 INJECTION, SOLUTION INTRAVENOUS at 12:59

## 2022-02-08 NOTE — ANESTHESIA PROCEDURE NOTES
Airway  Urgency: elective    Date/Time: 2/8/2022 1:00 PM  Airway not difficult    General Information and Staff    Patient location during procedure: OR  Anesthesiologist: Areli Lamb MD  CRNA: Schirmer, Shelley P, CRNA    Indications and Patient Condition  Indications for airway management: airway protection    Preoxygenated: yes  MILS not maintained throughout  Mask difficulty assessment: 1 - vent by mask    Final Airway Details  Final airway type: endotracheal airway      Successful airway: ETT  Cuffed: yes   Successful intubation technique: direct laryngoscopy  Endotracheal tube insertion site: oral  Blade: Maday  Blade size: 3  ETT size (mm): 6.5  Cormack-Lehane Classification: grade I - full view of glottis  Placement verified by: chest auscultation and capnometry   Measured from: lips  ETT/EBT  to lips (cm): 21  Number of attempts at approach: 1  Assessment: lips, teeth, and gum same as pre-op and atraumatic intubation    Additional Comments  Negative epigastric sounds, Breath sound equal bilaterally with symmetric chest rise and fall

## 2022-02-08 NOTE — OP NOTE
Plastic Surgery Operative Report        Patient Name:  Celsa Velez  :  1975  MRN:  [id number]    Date of Surgery:  2022       PREOPERATIVE DIAGNOSIS: Bilateral absent breasts, status post bilateral mastectomies for breast cancer.   POSTOPERATIVE DIAGNOSIS: Bilateral absent breasts, status post bilateral mastectomies for breast cancer.     PROCEDURES PERFORMED:  1. Bilateral breast reconstruction with immediate insertion of tissue expanders in a prepectoral position.    2. Use of AlloDerm in breast reconstruction bilaterally.     SURGEON: Tim Brown MD      CPT® Codes: 24963, 19596     Staff:  Surgeon(s):  Claudia Barahona MD Moore, Evan M, MD    Circulator: Vivien Aguilar RN; Christopher Mcarthur RN  Physician Assistant: Shannon Bains PA-C  Scrub Person: Karina Lehman; Faheem Bermudez; Cyrus Murdock  Assistant: Venkat Ballesteros PA     Assistant: Venkat Ballesteros PA  was responsible for performing the following activities: Retraction, Suction, Irrigation, Suturing, Closing and Placing Dressing and their skilled assistance was necessary for the success of this case.      Anesthesia: General      Indications for the Procedure:  46yoF healthy nonsmoker  with [id number] ER+ DE+ Her2 negative invasive ductal carcinoma of the right breast A) right breast biopsy of 1.3 cm mass on 21 showed a grade 2 IDC.  Biopsy of right axillary LN malignant with plans requiring chemotherapy and radiation.       Operative Findings:  1. Placement of 11 cm Tissue expanders filled to 150 cc        Description of the Procedure:   Patient was seen in preop holding risks and benefits were discussed with the patient who elected to proceed with the procedure. At this point in time H&P was updated, consent was obtained, and the patient was then marked appropriately (including the breast borders and the mastectomy incision patterns). The patient was then taken back to the OR placed in a  supine position on the operating room table and general anesthesia was induced followed by intubation. The patient was prepped and draped in a sterile like fashion. A time out was performed in which the patient and the procedure were gone over. Everyone was in agreement in terms of the patient and the procedure.      Dr. GARCIA then started the procedure by performing the bilateral mastectomies and the SLNBs. On the sterile back table the tissue expanders were then prepped and wrapped in alloderm. Tissue expanders were then selected checking expiration date to be sure they were not . The alloderms were also selected checking expiration date to be sure they were not . The air was then taken out of the tissue expanders. Injectable normal saline was then placed into the tissue expanders a total of 150 cc into each tissue expander. The alloderm was then prepped in a sterile like fashion. The alloderm was then used to wrap the individual tissue expanders suturing together with 3-0 vicryl and tailoring the ends with bustillos scissors. Once the tissue expanders were wrapped in alloderm they were placed in sterile bowels with antibiotic solution and covered until they were needed during the case.      Following the end of the bilateral mastectomies and SLNBs Dr. GARCIA had completed the procedures at this point Plastic surgery took over the remainder of the case. The patient's skin was then re prepped with betadine paint. The patient was then re draped in a sterile like fashion. New light handle covers, new bovie and new suction were then placed. Working in both breast pockets. The pockets were then irrigated with warm normal saline. Hemostasis was then checked with bovie cautery taking care not to burn the skin flaps. Once hemostasis was obtained the pockets were irrigated with antibiotic solution.      The tissue expanders wrapped in alloderm were then placed into the breast pockets in a prepectoral position and the  suture tabs were sutured in place with 2-0 silk suture. Top gloves were changed during each handling of the tissue expanders. Two 15 Greek drains were placed into each breast pocket one placed medially in the breast and the other placed laterally in the breast. Drains were sutured in place with 3-0 nylon suture. Biopatches were placed around the drains. The mastectomies skin edges were then freshened with a 10 blade. The mastectomies incision was then closed by deep 3-0 monocryls,  deep dermal 3-0 monocryls followed by running 3-0 stratifix suture. Provena incisional wound vac was placed over bilateral mastectomy incisions. The drain sites were then dressed. Drapes were taken down and the patient was placed into a surgical bra with fluffs. At this point in time the procedure had finished. Patient was the extubated and taken to PACU for a full recovery.       Implants:    Implant Name Type Inv. Item Serial No.  Lot No. LRB No. Used Action   DEV CONTRL TISS STRATAFIX SPIRAL MNCRYL UD 3/0 PLS 30CM - [id number] Implant DEV CONTRL TISS STRATAFIX SPIRAL MNCRYL UD 3/0 PLS 30CM  [employer]  DIV [employer] J [employer] J  Left 1 Implanted   DEV CONTRL TISS STRATAFIX SPIRAL MNCRYL UD 3/0 PLS 30CM - [id number] Implant DEV CONTRL TISS STRATAFIX SPIRAL MNCRYL UD 3/0 PLS 30CM  [employer]  DIV OF J [employer] J  Right 1 Implanted   EXPNDR TISS BRST F/HT XPROJ STL 133S FX/T 350CC - [id number] Implant EXPNDR TISS BRST F/HT XPROJ STL 133S FX/T 350CC  [employer] [facility] [employer] [id number] Right 1 Implanted   GRFT TISS ALLODERM RTM PERF [id number] 2.4MM/THK .4MM - [id number] Implant GRFT TISS ALLODERM RTM PERF [id number] 2.4MM/THK .4MM  [employer] [facility] [id number] Right 1 Implanted   GRFT TISS ALLODERM RTM PERF [id number] 2.4MM/THK .4MM - [id number] Implant GRFT TISS ALLODERM RTM PERF [id number] 2.4MM/THK .4MM  [employer] [facility] [id number] Left 1 Implanted   EXPNDR TISS BRST F/HT XPROJ STL 133S FX/T  350CC - [id number] Implant EXPNDR TISS BRST F/HT XPROJ STL 133S FX/T 350CC  [employer] [id number] Left 1 Implanted       Specimen Removed:        Left and right breast tissue    Estimated Blood Loss: 10 cc plastic surgery portion    Drains Placed: 4 drains total placed, Two in each breast    Complications: none immediate      Tim Brown MD     Date: 2/8/2022  Time: 17:34 EST

## 2022-02-08 NOTE — ANESTHESIA PROCEDURE NOTES
Peripheral Block    Pre-sedation assessment completed: 2/8/2022 1:00 PM    Patient reassessed immediately prior to procedure    Patient location during procedure: OR  Start time: 2/8/2022 1:00 PM  Stop time: 2/8/2022 1:06 PM  Reason for block: at surgeon's request and post-op pain management  Performed by  Anesthesiologist: Areli Lamb MD  CRNA: Denisha Urrutia CRNA  Preanesthetic Checklist  Completed: patient identified, IV checked, site marked, risks and benefits discussed, surgical consent, monitors and equipment checked, pre-op evaluation and timeout performed  Prep:  Pt Position: supine  Sterile barriers:cap, gloves, gown and mask  Prep: ChloraPrep  Patient monitoring: blood pressure monitoring, continuous pulse oximetry and EKG  Procedure  Performed under: general  Guidance:ultrasound guided and landmark technique  Images:still images obtained, printed/placed on chart    Laterality:Bilateral  Block Type:PECS I and PECS II  Injection Technique:single-shot  Needle Type:short-bevel  Needle Gauge:20 G  Resistance on Injection: none    Medications Used: bupivacaine PF (MARCAINE) 0.25 % injection, 50 mL  dexamethasone sodium phosphate injection, 4 mg  Med administered at 2/8/2022 1:06 PM      Medications  Preservative Free Saline:10ml  Comment:Block Injection:  Total volume of LA divided between Right and Left sided blocks         Post Assessment  Injection Assessment: negative aspiration for heme and incremental injection  Patient Tolerance:comfortable throughout block  Complications:no  Additional Notes  The pt. Was placed in the Supine Position and GA was induced     The insertion site was prepped with CHG and Ultrasound guidance with In-Plane techniquewas  a 4inch BBraun 360 degree echogenic needle was visualized.  Normal Saline PSF was  utilized for hydrodissection of tissue. PECS 1 Block- Pectoralis Major and Minor where identified and LA was injected between PMM and PmM at the level of the 3rd  Rib(10ml),  PECS 2-  Pectoralis Minor and Serratus muscle where identified and the needle was advanced laterally in-plane with the 4th rib as a backstop, pleura was monitored.  LA was injected between SA and PmM at the level of 4th rib( 20ml).  LA injection spread was visualized, injection was incremental 1-5ml, normal or low injection pressure, no intravascular injection, no pneumothorax appreciated.  Thank You.

## 2022-02-08 NOTE — H&P
Plastic Surgery Consult      Admission Date:  2022  LOS:  0  Patient Care Team:  Yecenia Giang MD as PCP - General  Yecenia Giang MD as PCP - Family Medicine  Francoise Mcgarry MD as Consulting Physician (Obstetrics and Gynecology)    Patient Name:  Celsa Velez  :  1975  MRN:  [id number]    Date:  2022      History of Present Illness:  Dr. Celsa Velez 46yoF healthy nonsmoker  with [id number] ER+ [place]+ Her2 negative invasive ductal carcinoma of the right breast A) right breast biopsy of 1.3 cm mass on 21 showed a grade 2 IDC.  Biopsy of right axillary LN malignant. Patient states she will need chemotherapy and radiation. She has done a lot of research and would like to have immediate reconstruction with implants. Patient states no breast surgeries. She has elected for bilateral mastectomies. Patient was seen in clinic and found appropriate for immediate breast reconstruction with tissue expanders. Patient was then scheduled for OR.     Breast Surgery: None  Breast Goal: Breast D to a B cup    Breast pathology  Final Diagnosis  RIGHT BREAST, 2:00, 1.3 CM IRREGULAR MASS, ULTRASOUND-GUIDED BIOPSY:  Invasive ductal carcinoma, [place] histologic grade 2  Tubule formation: 3  Nuclear pleomorphism: 3  Mitotic rate: 1  Intermediate grade ductal carcinoma in situ  Estrogen receptor (ER) - Positive  Progesterone receptor (MN) - Positive  Her2/kassie - Negative    PMH: JRA,  PSH: Knee Surgery, DNC  SH: denies smoking, drinking, or illicit drug abuse. Lives in [place]. Patient is a . Lives at home with her  and two children  FH: Family History of Breast cancer: Mother with breast cancer  Allergies: Penicillin  Medications: No blood thinners or herbal supplements.          History:   Past Medical History:   Diagnosis Date   • Acute sinusitis    • Allergic    • Arthritis    • JRA (juvenile rheumatoid arthritis) (HCC)    • Malignant neoplasm of right  breast in female, estrogen receptor positive (HCC) 12/22/2021   • Seizure disorder (HCC)    • UTI (urinary tract infection)     Last Impression: 02 Nov 2014  Send urine for culture. Treat with cipro until results are  available.  Almaz Cavazos (Internal Medicine)     Past Surgical History:   Procedure Laterality Date   • BREAST BIOPSY Right 2014    us bx   • COLONOSCOPY     • DILATION AND CURETTAGE, DIAGNOSTIC / THERAPEUTIC      Dilation And Curettage Of Cervical Stump   • KNEE SURGERY     • US GUIDED LYMPH NODE BIOPSY  12/13/2021     Family History   Problem Relation Age of Onset   • Colon cancer Father    • Arthritis Other    • Cancer Other    • Migraines Other    • Breast cancer Mother 68   • Breast cancer Paternal Grandmother 78   • Breast cancer Maternal Cousin    • Ovarian cancer Neg Hx      Social History     Socioeconomic History   • Marital status:    Tobacco Use   • Smoking status: Never Smoker   • Smokeless tobacco: Never Used   Vaping Use   • Vaping Use: Never used   Substance and Sexual Activity   • Alcohol use: Yes     Comment: SOCIAL   • Drug use: No   • Sexual activity: Defer     Allergies   Allergen Reactions   • Hydrocodone Nausea And Vomiting     Pt states she is unsure if reaction was due to waking up from anesthesia or from the pain med   • Penicillins Hives       Medication:    Current Facility-Administered Medications:   •  ceFAZolin in dextrose (ANCEF) IVPB solution 2 g, 2 g, Intravenous, Once, Claudia Barahona MD  •  famotidine (PEPCID) injection 20 mg, 20 mg, Intravenous, Once, Venkat Pal MD  •  famotidine (PEPCID) tablet 20 mg, 20 mg, Oral, Once, Venkat Pal MD  •  lactated ringers infusion, 9 mL/hr, Intravenous, Continuous PRN, Venkat Pal MD, Last Rate: 9 mL/hr at 02/08/22 1041, 9 mL/hr at 02/08/22 1041  •  lactated ringers infusion, 9 mL/hr, Intravenous, Continuous, Venkat Pal MD  •  lidocaine PF 1% (XYLOCAINE) injection 0.5 mL, 0.5 mL, Injection, Once  PRPARVEEN, Venkat Pal MD  •  midazolam (VERSED) injection 1 mg, 1 mg, Intravenous, Q10 Min PRN, Venkat Pal MD  •  scopolamine patch 1 mg/72 hr, 1 patch, Transdermal, Continuous, Claudia Barahona MD, 1 patch at 22 1041  •  sodium chloride 0.9 % flush 10 mL, 10 mL, Intravenous, Q12H, Venkat Pal MD  •  sodium chloride 0.9 % flush 10 mL, 10 mL, Intravenous, Q12H, Venkat Pal MD  •  sodium chloride 0.9 % flush 10 mL, 10 mL, Intravenous, PRN, Venkat Pal MD    Antibiotics:  Anti-Infectives (From admission, onward)    Ordered     Dose/Rate Route Frequency Start Stop    22 1012  ceFAZolin in dextrose (ANCEF) IVPB solution 2 g        Ordering Provider: Claudia Barahona MD    2 g  over 30 Minutes Intravenous Once 22 1014            Allergies   Allergen Reactions   • Hydrocodone Nausea And Vomiting     Pt states she is unsure if reaction was due to waking up from anesthesia or from the pain med   • Penicillins Hives          Review of Systems:  Constitutional-- No Fever, chills or sweats.  Appetite okay, and no malaise. No fatigue.  HEENT-- No new vision, hearing or throat complaints.  No epistaxis or oral sores.  Denies odynophagia or dysphagia. No headache, photophobia or neck stiffness.  CV-- No chest pain, palpitation or syncope  Resp-- No SOB/cough/Hemoptysis  GI- No nausea, vomiting, or diarrhea.  No hematochezia, melena, or hematemesis. Denies jaundice or chronic liver disease.  -- No dysuria, hematuria, or flank pain.  Denies hesitancy, urgency or flank pain.  Lymph- no swollen lymph nodes in neck/axilla or groin.   Heme- No active bruising or bleeding; no Hx of DVT or PE.  MS-- no swelling or pain in the bones or joints of arms/legs.  No new back pain.  He does not verbalize pain per sister at bedside.  Neuro-- No acute focal weakness or numbness in the arms or legs.  No seizures.  Skin--No rashes.       Physical Exam:   Vital Signs:  Temp (24hrs), Av.6 °F (36.4  °C), Min:97.6 °F (36.4 °C), Max:97.6 °F (36.4 °C)    Temp  Min: 97.6 °F (36.4 °C)  Max: 97.6 °F (36.4 °C)  BP  Min: 110/69  Max: 110/69  Pulse  Min: 70  Max: 70  Resp  Min: 18  Max: 18  SpO2  Min: 100 %  Max: 100 %     General: Well developed, well nourished, alert and cooperative, and appears to be in no acute distress.  Head: normocephalic.  HEENT: Normocephalic, atraumatic.  EOMI. No conjunctival injection. No icterus. No nasal discharge.  Heart: Rhythm is regular. There is no peripheral edema, cyanosis or pallor. Extremities are warm and well perfused. Capillary refill is less than 2 seconds.  Lungs: Clear to auscultation bilaterally, Normal respiratory effort. Nonlabored. No dullness.  Abdomen: Soft, nontender, nondistended. No rebound or guarding. NO mass or HSM.  Musculoskeletal: ROM intact spine and extremities. No joint erythema or tenderness. Normal muscular development. Normal gait.  Extremity:  No cyanosis, No cord. No significant deformity or joint abnormality. No edema.  SKIN: Warm and dry without cutaneous eruptions on Inspection/palpation.      Focused Breast Exam: bilateral breast grade II breast ptosis, medium sized areola, laterally displaced areolas    Rt breast:  BW: 12 cm  SN-N: 15 cm  N-IMF: 9 cm    Lt breast:  BW: 12 cm  SN-N: 15 cm  N-IMF: 9 cm      Laboratory Data:  Results from last 7 days   Lab Units 02/01/22  1219   WBC 10*3/mm3 6.22   HEMOGLOBIN g/dL 13.4   HEMATOCRIT % 39.6   PLATELETS 10*3/mm3 202     Results from last 7 days   Lab Units 02/01/22  1219   SODIUM mmol/L 138   POTASSIUM mmol/L 4.2   CHLORIDE mmol/L 103   CO2 mmol/L 24.0   BUN mg/dL 16   CREATININE mg/dL 0.77   GLUCOSE mg/dL 117*   CALCIUM mg/dL 9.3     Results from last 7 days   Lab Units 02/01/22  1219   ALK PHOS U/L 42   BILIRUBIN mg/dL 0.4   ALT (SGPT) U/L 10   AST (SGOT) U/L 16                         Estimated Creatinine Clearance: 81 mL/min (by C-G formula based on SCr of 0.77 mg/dL).    Microbiology:  No results  found for: ACANTHNAEG, AFBCX, BPERTUSSISCX, BLOODCX  No results found for: BCIDPCR, CXREFLEX, CSFCX, CULTURETIS  No results found for: CULTURES, HSVCX, URCX  No results found for: EYECULTURE, GCCX, HSVCULTURE, LABHSV  No results found for: LEGIONELLA, MRSACX, MUMPSCX, MYCOPLASCX  No results found for: NOCARDIACX, STOOLCX  No results found for: THROATCX, UNSTIMCULT, URINECX, CULTURE, VZVCULTUR  No results found for: VIRALCULTU, WOUNDCX          Assessment: 46yoF healthy nonsmoker  with [id number] ER+ [place]+ Her2 negative invasive ductal carcinoma of the right breast A) right breast biopsy of 1.3 cm mass on 12/13/21 showed a grade 2 IDC.  Biopsy of right axillary LN malignant with plans requiring chemotherapy and radiation.    Plan:  - Discussed again risks and benefits of implant-based reconstruction.    - Discussed again reconstruction with implants and radiation and how there is increased risk of complications and issues with radiation including infection, wounds break down, asymmetry. Discussed that radiation she will have two different breast. Patient states she understands.  - Patient to OR for bilateral skin sparring mastectomies with Dr. GARCIA followed by immediate breast reconstruction with tissue expanders wrapped in alloderm            Tim Brown MD  02/08/22  11:39 EST

## 2022-02-08 NOTE — OP NOTE
Operative Note    Celsa Velez  [id number]   1975     Date of Surgery:  2/8/2022    Pre-Operative Diagnosis: Right breast cancer in the upper inner quadrant with axillary node metastases    Post-Operative Diagnosis: Same    Procedure: Right skin sparing mastectomy                       Right axillary dissection                       Prophylactic left skin sparing mastectomy    Anesthesia:  General     Surgeon:  Claudia Barahona MD    Circulator: Vivien Aguilar RN; Christopher Mcarthur RN  Physician Assistant: Shannon Bains PA-C  Scrub Person: Karina Lehman; Faheem Bermudez; Cyrus Murdock  Assistant: Venkat Ballesteros PA     Assistant: Venkat Ballesteros PA    Estimated Blood Loss: Very minimal    Findings: Multiple large right axillary nodes noted.  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  Breast MRI showed multiple masses in the right breast as well as masses in the left breast.  The latter concerning for fibroadenomas.  Patient opted to proceed with above surgery with immediate reconstruction by Dr. Brown.    Procedure: Patient was taken to the operating room by anesthesia and placed supine on the table.  Following induction of general endotracheal anesthesia, SCDs were placed.  A Driver catheter was anchored.  She received 2 g of Kefzol IV.  Anesthesia placed ultrasound-guided pectoralis nerve blocks bilaterally.  The breasts, chest, axillas and upper arms were then prepped and draped in a sterile fashion.  Timeout was observed.  We proceeded with the prophylactic left skin sparing mastectomy first which was done via a transverse elliptical incision, encompassing the areola and nipple complex.  A superior than  inferior flaps were raised dissecting the breast tissue away from the flaps down to where the muscle was exposed, maintaining adequate thickness of the flaps.  The breast was then removed off of the underlying pectoralis major muscle taking the fascia along with the specimen.  Larger vessels of the breast were ligated with 3-0 silk suture.  The breast was removed as a whole, marked with a silk suture laterally, weighed and sent to pathology for permanent section.  The wound was then irrigated with warm water with clear return fluid noted.  We then proceeded with the right skin sparing mastectomy which was done in a similar fashion.  The breast was also removed as a whole, marked with a silk suture laterally, weighed and sent to pathology for permanent section.  We then proceeded with the axial dissection where multiple palpable nodes were appreciated.  These were excised while preserving the long thoracic, thoracodorsal as well as the intercostal brachial nerves.  Specimen x-ray showed the clip in the specimen.  The specimen was sent to pathology for permanent section.  I could not appreciate any other large axillary nodes.  The wound was then irrigated with warm water with clear return fluid noted.  At this time Dr. Brown proceeded with immediate reconstruction.  The patient was doing relatively well with anesthesia.  Sponge count and needle count were correct at the end of my procedure.    Assistant: Venkat Ballesteros PA  was responsible for performing the following activities: Retraction and Suction and their skilled assistance was necessary for the success of this case.      Claudia Barahona MD  02/08/22  15:40 EST

## 2022-02-08 NOTE — ANESTHESIA PREPROCEDURE EVALUATION
Anesthesia Evaluation     Patient summary reviewed and Nursing notes reviewed   NPO Solid Status: > 8 hours  NPO Liquid Status: > 8 hours           Airway   Mallampati: I  TM distance: >3 FB  Neck ROM: full  No difficulty expected  Dental - normal exam     Pulmonary     breath sounds clear to auscultation  Cardiovascular   Exercise tolerance: good (4-7 METS)    Rhythm: regular  Rate: normal        Neuro/Psych  GI/Hepatic/Renal/Endo      Musculoskeletal     Abdominal    Substance History      OB/GYN          Other                      Anesthesia Plan    ASA 2     general with block     intravenous induction         pec block post induction for post op pain  CODE STATUS:

## 2022-02-08 NOTE — PROGRESS NOTES
"Patient Name:  Celsa Velez  YOB: 1975  [id number]    Surgery Post - Operative Note    Date of visit: 2/8/2022    Subjective   Subjective: Feels OK, pain controlled.       Objective     Objective:    /78   Pulse 80   Temp 97.6 °F (36.4 °C) (Temporal)   Resp 20   Ht 165.1 cm (65\")   Wt 56.8 kg (125 lb 3.5 oz)   LMP 01/19/2022 (Exact Date)   SpO2 100%   BMI 20.84 kg/m²     CV:  Rate  regular and rhythm  regular  L:  Clear  to auscultation bilaterally. Dressings c/d/i. MARINA's serosanguinous, no hematomas  ABD:  Soft, nontender  EXT:  No cyanosis, clubbing or edema         Assessment/Plan     Assessment/ Plan: Doing well after B mastectomy. Continue Pulmonary toilet    Problem List Items Addressed This Visit     None      Visit Diagnoses     Breast cancer (HCC)        Relevant Orders    Tissue Pathology Exam           Active Hospital Problems    Diagnosis  POA   • Malignant neoplasm of upper-inner quadrant of right female breast (HCC) [C50.211]  Yes      Resolved Hospital Problems   No resolved problems to display.            Kota Lopez MD  2/8/2022  18:56 EST    "

## 2022-02-08 NOTE — H&P
Pre-Op H&P  Celsa Velez  [id number]  1975      Chief complaint: breast cancer      Subjective:  Patient is a 46 y.o.female presents for scheduled surgery by Dr. GARCIA. She anticipates a RIGHT BREAST MASTECTOMY WITH RIGHT SENTINEL NODE BIOPSY, LEFT PROPHYLATIC SKIN SPARRING MASTECTOMY; IMMEDIATE BILATERAL BREAST RECONSTRUCTION WITH TISSUE EXPANDER today. She had abnormal mammogram on 10/14/21. She had US guided biopsy on 12/13/21 and was found to have right breast IDC.      Review of Systems:  Constitutional-- No fever, chills or sweats. No fatigue.  CV-- No chest pain, palpitation or syncope  Resp-- No SOB, cough, hemoptysis  Skin--No rashes or lesions      Allergies:   Allergies   Allergen Reactions   • Hydrocodone Nausea And Vomiting     Pt states she is unsure if reaction was due to waking up from anesthesia or from the pain med   • Penicillins Hives         Home Meds:  Medications Prior to Admission   Medication Sig Dispense Refill Last Dose   • ASCORBIC ACID BUFFERED PO Take 1 tablet by mouth Daily.      • Cyanocobalamin (B-12) 1000 MCG sublingual tablet Place 1 tablet under the tongue Daily.      • fish oil-omega-3 fatty acids 1000 MG capsule Take 2 g by mouth Daily.      • levocetirizine (XYZAL) 5 MG tablet Take 5 mg by mouth Every Evening.      • TURMERIC PO Take 2 tablets by mouth Daily. TURMERIC - QUERCETIN - BROMELAIN COMBO      • vitamin B-6 (PYRIDOXINE) 100 MG tablet Take 100 mg by mouth Daily.      • vitamin D (ERGOCALCIFEROL) 1.25 MG (62461 UT) capsule capsule Take 1 capsule by mouth 1 (One) Time Per Week. 5 capsule 4          PMH:   Past Medical History:   Diagnosis Date   • Acute sinusitis    • Allergic    • Arthritis    • JRA (juvenile rheumatoid arthritis) ([facility])    • Malignant neoplasm of right breast in female, estrogen receptor positive ([facility]) 12/22/2021   • Seizure disorder ([facility])    • UTI (urinary tract infection)     Last Impression: 02 Nov 2014  Send urine for culture. Treat  with cipro until results are  available.  Almaz Cavazos (Internal Medicine)     PSH:    Past Surgical History:   Procedure Laterality Date   • BREAST BIOPSY Right 2014    us bx   • COLONOSCOPY     • DILATION AND CURETTAGE, DIAGNOSTIC / THERAPEUTIC      Dilation And Curettage Of Cervical Stump   • KNEE SURGERY     • US GUIDED LYMPH NODE BIOPSY  12/13/2021       Immunization History:  Influenza: 2021  Pneumococcal: No  Tetanus: UTD  Covid x3: 2021    Social History:   Tobacco:   Social History     Tobacco Use   Smoking Status Never Smoker   Smokeless Tobacco Never Used      Alcohol:     Social History     Substance and Sexual Activity   Alcohol Use Yes    Comment: SOCIAL         Physical Exam: VS: /69  HR 74  RR 16  T 97.6  Sat 100%RA      General Appearance:    Alert, cooperative, no distress, appears stated age   Head:    Normocephalic, without obvious abnormality, atraumatic   Lungs:     Clear to auscultation bilaterally, respirations unlabored    Heart:   Regular rate and rhythm, S1 and S2 normal    Abdomen:    Soft without tenderness   Extremities:   Extremities normal, atraumatic, no cyanosis or edema   Skin:   Skin color, texture, turgor normal, no rashes or lesions   Neurologic:   Grossly intact     Results Review:     LABS:  Lab Results   Component Value Date    WBC 6.22 02/01/2022    HGB 13.4 02/01/2022    HCT 39.6 02/01/2022    MCV 89.0 02/01/2022     02/01/2022    NEUTROABS 4.1 11/22/2021    GLUCOSE 117 (H) 02/01/2022    BUN 16 02/01/2022    CREATININE 0.77 02/01/2022    EGFRIFNONA 80 02/01/2022    EGFRIFAFRI 117 11/22/2021     02/01/2022    K 4.2 02/01/2022     02/01/2022    CO2 24.0 02/01/2022    CALCIUM 9.3 02/01/2022    ALBUMIN 4.60 02/01/2022    AST 16 02/01/2022    ALT 10 02/01/2022    BILITOT 0.4 02/01/2022     Final Diagnosis   RIGHT BREAST, 2:00, 1.3 CM IRREGULAR MASS, ULTRASOUND-GUIDED BIOPSY:  Invasive ductal carcinoma, [place] histologic grade 2      Tubule  formation: 3      Nuclear pleomorphism: 3       Mitotic rate: 1  Intermediate grade ductal carcinoma in situ  Estrogen receptor (ER) - Positive  Progesterone receptor ([place]) - Positive  Her2/kassie - Negative   Electronically signed by Trena Chaudhry DO on 12/15/2021 at 1434   Comment    This report was sent to the radiologist at [facility] on 12/15/2021.    Gross Description    Received in formalin labeled as right breast 1.3 cm irregular mass at 2:00 is a right ultrasound-guided biopsy consisting of a 1.2 x 0.8 x 0.3 cm aggregate of fibroadipose breast tissue fragments, submitted entirely in block 1A.  Time in formalin: 11:15 on 12/13/2021.  Cold ischemic time is less than 60 minutes and total time in formalin is greater than 6 and less than 72 hours.  LDP      Special Stains    IMMUNOHISTOCHEMICAL RESULTS (I[facility]):  E-Cadherin: Positive (supports invasive ductal carcinoma)  Estrogen Receptor            RESULT:  Positive          90%         3+ (INTENSITY SCORE)   Progesterone Receptor     RESULT:  Positive          50%          2+ (INTENSITY SCORE)   Her2/Kassie oncoprotein       RESULT:  Negative        30%          1+ (INTENSITY SCORE)       RADIOLOGY:  Imaging Results (Last 72 Hours)     ** No results found for the last 72 hours. **          I reviewed the patient's new clinical results.    Cancer Staging (if applicable)  Cancer Patient: _x_ yes __no __unknown; If yes, clinical stage II, T:_1_ N:_1_M:_0_,       Impression: [id number] ER+ [place]+ Her2 negative invasive ductal carcinoma of the right breast       Plan: RIGHT SKIN SPARING MASTECTOMY WITH RIGHT SENTINEL NODE BIOPSY, LEFT PROPHYLATIC SKIN SPARRING MASTECTOMY;   IMMEDIATE BILATERAL BREAST RECONSTRUCTION WITH TISSUE EXPANDER      DONOVAN Montejo   2/8/2022   10:23 EST

## 2022-02-08 NOTE — BRIEF OP NOTE
BREAST MASTECTOMY WITH SENTINEL NODE BIOPSY  Progress Note    Celsa Velez  2/8/2022    Pre-op Diagnosis:   Right breast cancer in the upper inner quadrant with axillary node metastases       Post-Op Diagnosis Codes:     * Malignant neoplasm of upper-inner quadrant of right female breast [C50.211]    Procedure/CPT® Codes:        Procedure(s):  RIGHT BREAST MASTECTOMY WITH RIGHT axillary node dissection,   LEFT PROPHYLATIC SKIN SPARRING MASTECTOMY  IMMEDIATE BILATERAL BREAST RECONSTRUCTION WITH TISSUE EXPANDER    Surgeon(s):  Claudia Barahona MD Moore, Evan M, MD    Anesthesia: General    Staff:   Circulator: Vivien Aguilar RN; Christopher Mcarthur RN  Physician Assistant: Shannon Bains PA-C  Scrub Person: Karina Lehman; Faheem Bermudez; Cyrus Murdock  Assistant: Venkat Ballesteros PA  Assistant: Venkat Ballesteros PA      Estimated Blood Loss: minimal    Urine Voided: 170 mL    Specimens:                Specimens     ID Source Type Tests Collected By Collected At Frozen?    A Breast, Left Tissue · TISSUE PATHOLOGY EXAM   Claudia Barahona MD 2/8/22 1412     Description: Left breast, suture lateral    This specimen was not marked as sent.    B Breast, Right Tissue · TISSUE PATHOLOGY EXAM   Claudia Barahona MD 2/8/22 1511     Description: Right breast, suture lateral    This specimen was not marked as sent.    C Axilla, Right Tissue · TISSUE PATHOLOGY EXAM   Claudia Barahona MD 2/8/22 1531     Description: Right axillary dissection    This specimen was not marked as sent.                Drains: * No LDAs found *    Findings: Multiple palpable right axillary nodes    Complications: None    Assistant: Venkat Ballesteros PA  was responsible for performing the following activities: Retraction and Suction and their skilled assistance was necessary for the success of this case.    Claudia Barahona MD     Date: 2/8/2022  Time: 15:35 EST

## 2022-02-08 NOTE — ANESTHESIA POSTPROCEDURE EVALUATION
Patient: Celsa Velez    Procedure Summary     Date: 02/08/22 Room / Location:  [facility]  [facility]  [facility]    Anesthesia Start: 1255 Anesthesia Stop: 1726    Procedures:       RIGHT BREAST MASTECTOMY WITH RIGHT SENTINEL NODE BIOPSY, LEFT PROPHYLATIC SKIN SPARRING MASTECTOMY (N/A Breast)      IMMEDIATE BILATERAL BREAST RECONSTRUCTION WITH TISSUE EXPANDER (Bilateral Breast) Diagnosis: Malignant neoplasm of upper-inner quadrant of right female breast    Surgeons: Claudia Barahona MD; Tim Brown MD Provider: Areli Lamb MD    Anesthesia Type: general with block ASA Status: 2          Anesthesia Type: general with block    Vitals  Vitals Value Taken Time   BP     Temp     Pulse     Resp     SpO2 98 % 02/08/22 1726           Post Anesthesia Care and Evaluation    Patient location during evaluation: PACU  Patient participation: waiting for patient participation  Level of consciousness: lethargic and responsive to physical stimuli  Pain management: adequate  Airway patency: patent  Anesthetic complications: No anesthetic complications  PONV Status: none  Cardiovascular status: hemodynamically stable and acceptable  Respiratory status: nonlabored ventilation, acceptable, nasal cannula and oral airway  Hydration status: acceptable

## 2022-02-09 VITALS
HEART RATE: 67 BPM | TEMPERATURE: 98.1 F | BODY MASS INDEX: 20.86 KG/M2 | DIASTOLIC BLOOD PRESSURE: 53 MMHG | WEIGHT: 125.22 LBS | SYSTOLIC BLOOD PRESSURE: 85 MMHG | OXYGEN SATURATION: 98 % | RESPIRATION RATE: 18 BRPM | HEIGHT: 65 IN

## 2022-02-09 PROCEDURE — 0 CEFAZOLIN IN DEXTROSE 2-4 GM/100ML-% SOLUTION: Performed by: SURGERY

## 2022-02-09 RX ADMIN — ACETAMINOPHEN 1000 MG: 500 TABLET ORAL at 06:05

## 2022-02-09 RX ADMIN — MELOXICAM 15 MG: 7.5 TABLET ORAL at 08:19

## 2022-02-09 RX ADMIN — CEFAZOLIN SODIUM 2 G: 2 INJECTION, SOLUTION INTRAVENOUS at 06:05

## 2022-02-09 NOTE — PROGRESS NOTES
"Celsa Velez  1975  [id number]    Surgery Progress Note    Date of visit: 2/9/2022    Subjective: Comfortable with very minimal pain  Ambulating    Objective:    BP (!) 85/53 (BP Location: Left arm)   Pulse 67   Temp 98.1 °F (36.7 °C) (Oral)   Resp 18   Ht 165.1 cm (65\")   Wt 56.8 kg (125 lb 3.5 oz)   LMP 01/19/2022 (Exact Date)   SpO2 98%   BMI 20.84 kg/m²     Intake/Output Summary (Last 24 hours) at 2/9/2022 0753  Last data filed at 2/9/2022 0613  Gross per 24 hour   Intake 1000 ml   Output 1630 ml   Net -630 ml       CV: Regular rate and rhythm  L: normal air entry  BREAST: Bilateral dressings and Prevena appliance are intact  Flaps are viable  Adequate [employer] drainage    LABS:              Invalid input(s): LABALBU, PROT      No results found for: LIPASE      Assessment/ Plan: Stable course status post bilateral skin sparing mastectomies and right axillary node dissection with immediate reconstruction  Patient is progressing well  Instructions were given to her  She already has prescriptions at home  Home today  Follow-up in the cancer clinic in 1 week    Problem List Items Addressed This Visit     None      Visit Diagnoses     Breast cancer (HCC)        Relevant Orders    Tissue Pathology Exam            Claudia Barahona MD  2/9/2022  07:53 EST    "

## 2022-02-09 NOTE — CASE MANAGEMENT/SOCIAL WORK
Continued Stay Note  [facility]     Patient Name: Celsa Velez  MRN: [id number]  Today's Date: 2/9/2022    Admit Date: 2/8/2022     Discharge Plan     Row Name 02/09/22 0859       Plan    Plan Home    Patient/Family in Agreement with Plan yes    Final Discharge Disposition Code 01 - home or self-care               Discharge Codes    No documentation.               Expected Discharge Date and Time     Expected Discharge Date Expected Discharge Time    Feb 9, 2022             Deepthi Alvarado RN

## 2022-02-09 NOTE — PROGRESS NOTES
Plastic Surgery Progress Note      Admission Date:  2022  LOS:  0  Patient Care Team:  Yecenia Giang MD as PCP - General  Yecenia Giang MD as PCP - Family Medicine  Francoise Mcgarry MD as Consulting Physician (Obstetrics and Gynecology)    Patient Name:  Celsa Velez  :  1975  MRN:  [id number]    Date:  2022    Subjective:    Did well overnight, VSS afebrile, pain controlled ambulating and voiding    History:   Past Medical History:   Diagnosis Date   • Acute sinusitis    • Allergic    • Arthritis    • JRA (juvenile rheumatoid arthritis) ([facility])    • Malignant neoplasm of right breast in female, estrogen receptor positive ([facility]) 2021   • Seizure disorder ([facility])    • UTI (urinary tract infection)     Last Impression: 2014  Send urine for culture. Treat with cipro until results are  available.  Almaz Cavazos (Internal Medicine)     Past Surgical History:   Procedure Laterality Date   • BREAST BIOPSY Right     us bx   • COLONOSCOPY     • DILATION AND CURETTAGE, DIAGNOSTIC / THERAPEUTIC      Dilation And Curettage Of Cervical Stump   • KNEE SURGERY     • US GUIDED LYMPH NODE BIOPSY  2021     Family History   Problem Relation Age of Onset   • Colon cancer Father    • Arthritis Other    • Cancer Other    • Migraines Other    • Breast cancer Mother 68   • Breast cancer Paternal Grandmother 78   • Breast cancer Maternal Cousin    • Ovarian cancer Neg Hx      Social History     Socioeconomic History   • Marital status:    Tobacco Use   • Smoking status: Never Smoker   • Smokeless tobacco: Never Used   Vaping Use   • Vaping Use: Never used   Substance and Sexual Activity   • Alcohol use: Yes     Comment: SOCIAL   • Drug use: No   • Sexual activity: Defer     Allergies   Allergen Reactions   • Hydrocodone Nausea And Vomiting     Pt states she is unsure if reaction was due to waking up from anesthesia or from the pain med   • Penicillins Hives        Medication:    Current Facility-Administered Medications:   •  acetaminophen (TYLENOL) tablet 1,000 mg, 1,000 mg, Oral, Q6H, Claudia Barahona MD, 1,000 mg at 02/09/22 0605  •  ceFAZolin in dextrose (ANCEF) IVPB solution 2 g, 2 g, Intravenous, Q8H, Claudia Barahona MD, Last Rate: 0 mL/hr at 02/08/22 2135, 2 g at 02/09/22 0605  •  diazePAM (VALIUM) tablet 2 mg, 2 mg, Oral, Q8H PRN, Claudia Barahona MD  •  diphenhydrAMINE (BENADRYL) injection 12.5 mg, 12.5 mg, Intravenous, Q6H PRN, Claudia Barahona MD  •  HYDROmorphone (DILAUDID) injection 0.3 mg, 0.3 mg, Intravenous, Q2H PRN **AND** naloxone (NARCAN) injection 0.1 mg, 0.1 mg, Intravenous, Q5 Min PRN, Claudia Barahona MD  •  lactated ringers infusion, 9 mL/hr, Intravenous, Continuous PRN, Claudia Barahona MD, Last Rate: 9 mL/hr at 02/08/22 1255, New Bag at 02/08/22 1547  •  lactated ringers infusion, 9 mL/hr, Intravenous, Continuous, Claudia Barahona MD  •  meloxicam (MOBIC) tablet 15 mg, 15 mg, Oral, Daily, Claudia Barahona MD, 15 mg at 02/09/22 0819  •  ondansetron (ZOFRAN) injection 4 mg, 4 mg, Intravenous, Q6H PRN, Claudia Barahona MD  •  oxyCODONE (ROXICODONE) immediate release tablet 5 mg, 5 mg, Oral, Q4H PRN, Claudia Barahona MD, 5 mg at 02/08/22 2102  •  promethazine (PHENERGAN) tablet 6.25 mg, 6.25 mg, Oral, Q6H PRN **OR** promethazine (PHENERGAN) suppository 6.25 mg, 6.25 mg, Rectal, Q6H PRN, Claudia Barahona MD  •  scopolamine patch 1 mg/72 hr, 1 patch, Transdermal, Continuous, Claudia Barahona MD, 1 patch at 02/08/22 1041    Antibiotics:  Anti-Infectives (From admission, onward)    Ordered     Dose/Rate Route Frequency Start Stop    02/08/22 1539  ceFAZolin in dextrose (ANCEF) IVPB solution 2 g        Ordering Provider: Claudia Barahona MD    2 g  over 30 Minutes Intravenous Every 8 Hours 02/08/22 2100 02/10/22 2059    02/08/22 1012  ceFAZolin in dextrose (ANCEF) IVPB solution 2 g         Ordering Provider: Claudia Barahona MD    2 g  over 30 Minutes Intravenous Once 22 1014 22 1313            Objective:    Physical Exam:   Vital Signs:  Temp (24hrs), Av.6 °F (36.4 °C), Min:96.5 °F (35.8 °C), Max:98.3 °F (36.8 °C)    Temp  Min: 96.5 °F (35.8 °C)  Max: 98.3 °F (36.8 °C)  BP  Min: 85/53  Max: 139/57  Pulse  Min: 60  Max: 89  Resp  Min: 16  Max: 20  SpO2  Min: 95 %  Max: 100 %    GENERAL: Awake and alert, in no acute distress.   HEENT: Normocephalic, atraumatic.  EOMI. No conjunctival injection. No icterus.   NECK: Supple without nuchal rigidity. No mass.  LYMPH: No cervical, axillary or inguinal lymphadenopathy.  HEART: RRR;   LUNGS: Clear to auscultation bilaterally, Normal respiratory effort. Nonlabored. No dullness.  ABDOMEN: Soft, nontender, nondistended. No rebound or guarding. NO mass or HSM.    Chest: incisional wound vac in place no pus or drainage, drains in place, no palpable hematoma or seroma    Laboratory Data:                                  Estimated Creatinine Clearance: 81 mL/min (by C-G formula based on SCr of 0.77 mg/dL).    Microbiology:  No results found for: ACANTHNAEG, AFBCX, BPERTUSSISCX, BLOODCX  No results found for: BCIDPCR, CXREFLEX, CSFCX, CULTURETIS  No results found for: CULTURES, HSVCX, URCX  No results found for: EYECULTURE, GCCX, HSVCULTURE, LABHSV  No results found for: LEGIONELLA, MRSACX, MUMPSCX, MYCOPLASCX  No results found for: NOCARDIACX, STOOLCX  No results found for: THROATCX, UNSTIMCULT, URINECX, CULTURE, VZVCULTUR  No results found for: VIRALCULTU, WOUNDCX      Assessment: 46yoF healthy nonsmoker  with pH1xA2P2 ER+ VA+ Her2 negative invasive ductal carcinoma of the right breast A) right breast biopsy of 1.3 cm mass on 21 showed a grade 2 IDC.  Biopsy of right axillary LN malignant with plans requiring chemotherapy and radiation. Patient now s/p 22 bilateral skin sparring mastectomies with Rt axillary dissection  followed by immediate breast reconstruction with TE wrapped in alloderm      Plan:  - no acute surgery, patient may be discharged from a plastic surgery standpoint  - continue incisional wound vac  - drain care and drain teaching  - ambulate  - no heavy lifting to upper extremity  - no shower or baths  - follow up with Dr. Tim Brown in 1 week as scheduled          Tim Brown MD  02/09/22  11:42 EST

## 2022-02-09 NOTE — PLAN OF CARE
Goal Outcome Evaluation:  Plan of Care Reviewed With: patient        Progress: improving  Outcome Summary: VSS. pain controlled with oral meds. Tolerating diet. UOP-WNL. IVF and IV ABX infusing. anticipate d/c today. will continue to monitor.

## 2022-02-09 NOTE — PLAN OF CARE
Goal Outcome Evaluation:           Progress: improving  Outcome Summary: Pain controlled with oral meds. Tolerating diet. NASIM x4 to bulb suction; tubes stripped and home education provided. Pt dem. understanding of home care. Discharged home.

## 2022-02-13 NOTE — PROGRESS NOTES
"      PROBLEM LIST:  1. [id number] (prognostic stage IB) ER+ (90%)  TX+ (50%)  Her2 negative (1+) invasive ductal carcinoma of the right breast  A) right breast biopsy of 1.3 cm mass on 12/13/21 showed a grade 2 IDC.   Ki67 10-15%. Biopsy of right axillary LN malignant.  Pet/ct 12/28/21 showed hypermetabolic right axillary LN, no other sites of metastatic disease  B) bilateral mastectomy on 2/8/22.  Pathology showed a grade 2 multifocal IDC in the right breast, largest focus 1.1 cm, 0.8 and 0.4 cm tumors as well.  5/5 right axillary LN involved, focal extracapsular extension in 1.    Subjective     CHIEF COMPLAINT: breast cancer    HISTORY OF PRESENT ILLNESS:   Celsa Velez returns for follow-up.   She had bilateral mastectomy last week.    She had tissue expanders placed.  She met with Dr. Martins earlier today about radiation treatment.      Objective      /62   Pulse 82   Temp 98 °F (36.7 °C)   Resp 16   Ht 165.1 cm (65\")   Wt 58.1 kg (128 lb)   LMP 01/19/2022 (Exact Date)   SpO2 97%   BMI 21.30 kg/m²      Performance Status:  ECOG score: 0             General: well appearing female in no acute distress  Neuro: alert and oriented  HEENT: sclera anicteric  Skin: no rashes, lesions, bruising, or petechiae  Psych: mood and affect appropriate        RECENT LABS:  Lab Results   Component Value Date    WBC 6.22 02/01/2022    HGB 13.4 02/01/2022    HCT 39.6 02/01/2022    MCV 89.0 02/01/2022     02/01/2022       Lab Results   Component Value Date    GLUCOSE 117 (H) 02/01/2022    BUN 16 02/01/2022    CREATININE 0.77 02/01/2022    EGFRIFNONA 80 02/01/2022    EGFRIFAFRI 117 11/22/2021    BCR 20.8 02/01/2022    K 4.2 02/01/2022    CO2 24.0 02/01/2022    CALCIUM 9.3 02/01/2022    PROTENTOTREF 6.7 11/22/2021    ALBUMIN 4.60 02/01/2022    LABIL2 2.2 11/22/2021    AST 16 02/01/2022    ALT 10 02/01/2022                   Assessment/Plan   Celsa Inez Velez is a 46 y.o. female with a [id number] ER+ " Her2 negative IDC of the right breast.    I would recommend adjuvant chemotherapy in her case.  We discussed treatment with dose dense AC followed by taxol.   We reviewed the side effects of this regimen including nausea, fatigue, myelosuppression, infusion reaction, cardiotoxicity, myelodysplasia, neuropathy, alopecia, and constipation or diarrhea.    Following chemotherapy she is a candidate for radiation therapy followed by adjuvant endocrine therapy, as well as the addition of abemaciclib given multiple node involvement.  We also discussed ovarian suppression with an AI as an alternative to tamoxifen.  At this point she says she would prefer to just do tamoxifen.    In terms of the timing of her reconstruction surgery, we discussed that sometimes the permanent implants can be placed between chemotherapy and radiation, but it may depend on whether there are any wound issues or delays that occur.    We will tentatively plan to start treatment in about a month.  We will order a baseline echocardiogram and have her see cardio oncology.  She will also need port placement.    Follow-up with her first treatment.      Total time of patient care on day of service including time prior to, face to face with patient, and following visit spent in reviewing records, lab results, imaging studies, discussion with patient, and documentation/charting was > 45 minutes.                        Tana Alarocn MD  [facility]    2/16/2022          CC:

## 2022-02-15 DIAGNOSIS — C50.011 MALIGNANT NEOPLASM OF NIPPLE OF RIGHT BREAST IN FEMALE, ESTROGEN RECEPTOR POSITIVE: Primary | ICD-10-CM

## 2022-02-15 DIAGNOSIS — Z17.0 MALIGNANT NEOPLASM OF NIPPLE OF RIGHT BREAST IN FEMALE, ESTROGEN RECEPTOR POSITIVE: Primary | ICD-10-CM

## 2022-02-16 ENCOUNTER — OFFICE VISIT (OUTPATIENT)
Dept: RADIATION ONCOLOGY | Facility: HOSPITAL | Age: 47
End: 2022-02-16

## 2022-02-16 ENCOUNTER — OFFICE VISIT (OUTPATIENT)
Dept: ONCOLOGY | Facility: CLINIC | Age: 47
End: 2022-02-16

## 2022-02-16 ENCOUNTER — HOSPITAL ENCOUNTER (OUTPATIENT)
Dept: OCCUPATIONAL THERAPY | Facility: HOSPITAL | Age: 47
Setting detail: THERAPIES SERIES
Discharge: HOME | End: 2022-02-16

## 2022-02-16 ENCOUNTER — HOSPITAL ENCOUNTER (OUTPATIENT)
Dept: RADIATION ONCOLOGY | Facility: HOSPITAL | Age: 47
Setting detail: RADIATION/ONCOLOGY SERIES
Discharge: HOME | End: 2022-02-16

## 2022-02-16 VITALS
SYSTOLIC BLOOD PRESSURE: 102 MMHG | RESPIRATION RATE: 16 BRPM | BODY MASS INDEX: 21.33 KG/M2 | WEIGHT: 128 LBS | TEMPERATURE: 98 F | DIASTOLIC BLOOD PRESSURE: 62 MMHG | HEART RATE: 82 BPM | OXYGEN SATURATION: 97 % | HEIGHT: 65 IN

## 2022-02-16 VITALS — WEIGHT: 127.9 LBS | BODY MASS INDEX: 21.28 KG/M2

## 2022-02-16 VITALS — WEIGHT: 125 LBS | BODY MASS INDEX: 20.83 KG/M2 | HEIGHT: 65 IN

## 2022-02-16 DIAGNOSIS — T45.1X5A: Primary | ICD-10-CM

## 2022-02-16 DIAGNOSIS — C50.911 MALIGNANT NEOPLASM OF RIGHT BREAST IN FEMALE, ESTROGEN RECEPTOR POSITIVE, UNSPECIFIED SITE OF BREAST: ICD-10-CM

## 2022-02-16 DIAGNOSIS — Z17.0 MALIGNANT NEOPLASM OF RIGHT BREAST IN FEMALE, ESTROGEN RECEPTOR POSITIVE, UNSPECIFIED SITE OF BREAST: ICD-10-CM

## 2022-02-16 DIAGNOSIS — Z17.0 MALIGNANT NEOPLASM OF UPPER-INNER QUADRANT OF RIGHT BREAST IN FEMALE, ESTROGEN RECEPTOR POSITIVE: Primary | ICD-10-CM

## 2022-02-16 DIAGNOSIS — C50.211 MALIGNANT NEOPLASM OF UPPER-INNER QUADRANT OF RIGHT BREAST IN FEMALE, ESTROGEN RECEPTOR POSITIVE: Primary | ICD-10-CM

## 2022-02-16 DIAGNOSIS — C50.911 MALIGNANT NEOPLASM OF RIGHT FEMALE BREAST, UNSPECIFIED ESTROGEN RECEPTOR STATUS, UNSPECIFIED SITE OF BREAST: Primary | ICD-10-CM

## 2022-02-16 DIAGNOSIS — I42.7: Primary | ICD-10-CM

## 2022-02-16 LAB
CYTO UR: NORMAL
LAB AP CASE REPORT: NORMAL
LAB AP CLINICAL INFORMATION: NORMAL
LAB AP DIAGNOSIS COMMENT: NORMAL
LAB AP GENOMIC HEALTH, ADDENDUM: NORMAL
LAB AP SPECIAL STAINS: NORMAL
PATH REPORT.FINAL DX SPEC: NORMAL
PATH REPORT.GROSS SPEC: NORMAL

## 2022-02-16 PROCEDURE — 97166 OT EVAL MOD COMPLEX 45 MIN: CPT

## 2022-02-16 PROCEDURE — 93702 BIS XTRACELL FLUID ANALYSIS: CPT

## 2022-02-16 PROCEDURE — 99215 OFFICE O/P EST HI 40 MIN: CPT | Performed by: INTERNAL MEDICINE

## 2022-02-16 PROCEDURE — G0463 HOSPITAL OUTPT CLINIC VISIT: HCPCS | Performed by: RADIOLOGY

## 2022-02-16 RX ORDER — ONDANSETRON 4 MG/1
TABLET, ORALLY DISINTEGRATING ORAL
COMMUNITY
Start: 2022-02-01 | End: 2022-06-27 | Stop reason: DRUGHIGH

## 2022-02-16 RX ORDER — OXYCODONE HYDROCHLORIDE 5 MG/1
TABLET ORAL
COMMUNITY
Start: 2022-02-04 | End: 2022-09-06

## 2022-02-16 RX ORDER — MULTIVIT WITH MINERALS/LUTEIN
500 TABLET ORAL DAILY
COMMUNITY
End: 2022-09-06

## 2022-02-16 NOTE — THERAPY EVALUATION
Outpatient Occupational Therapy Lymphedema Initial Evaluation   [place]     Patient Name: Celsa Velez  : 1975  MRN: [id number]  Today's Date: 2022      Visit Date: 2022    Patient Active Problem List   Diagnosis   • IBS (irritable bowel syndrome)   • Vitamin D deficiency   • Hematuria   • Family history of breast cancer in mother   • Malignant neoplasm of right breast in female, estrogen receptor positive (HCC)   • Malignant neoplasm of upper-inner quadrant of right female breast (HCC)        Past Medical History:   Diagnosis Date   • Acute sinusitis    • Allergic    • Arthritis    • JRA (juvenile rheumatoid arthritis) (HCC)    • Malignant neoplasm of right breast in female, estrogen receptor positive (HCC) 2021    Wild Breast Mastectomies on 22   • Seizure disorder (HCC)     Possibly puberty/hormonal related; has not had a seizure since her teens   • UTI (urinary tract infection)     Last Impression: 2014  Send urine for culture. Treat with cipro until results are  available.  Almaz Cavazos (Internal Medicine)        Past Surgical History:   Procedure Laterality Date   • BREAST BIOPSY Right     us bx   • BREAST RECONSTRUCTION, BREAST TISSUE EXPANDER INSERTION Bilateral 2022    Procedure: IMMEDIATE BILATERAL BREAST RECONSTRUCTION WITH TISSUE EXPANDER;  Surgeon: Tim Brown MD;  Location: [facility] OR;  Service: Plastics;  Laterality: Bilateral;   • COLONOSCOPY     • DILATION AND CURETTAGE, DIAGNOSTIC / THERAPEUTIC      Dilation And Curettage Of Cervical Stump   • KNEE SURGERY     • MASTECTOMY     • MASTECTOMY W/ SENTINEL NODE BIOPSY N/A 2022    Procedure: BREAST MASTECTOMY WITH RIGHT SENTINEL NODE BIOPSY, LEFT PROPHYLATIC SKIN SPARRING MASTECTOMY;  Surgeon: Claudia Barahona MD;  Location: [facility] OR;  Service: General;  Laterality: N/A;   • US GUIDED LYMPH NODE BIOPSY  2021         Visit Dx:     ICD-10-CM ICD-9-CM   1. Malignant neoplasm of  right female breast, unspecified estrogen receptor status, unspecified site of breast ([facility])  C50.911 174.9            Lymphedema     Row Name 02/16/22 1130             Subjective Pain    Able to rate subjective pain? 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 female found on mammogram to have a suspicious 1.3 cm irregular mass in the right 2:00 subareolar region.  Other nodular asymmetries were thought to be cysts and benign appearing oval masses were consistent with fibrocystic change.  She had unilateral right axillary adenopathy.   Ultrasound-guided biopsy of the breast mass was positive for invasive ductal carcinoma, histologic grade 2 with intermediate grade DCIS.  The tumor was ER/LA positive HER-2/kassie negative.  Biopsy of the right 2.2 cm axillary lymph node was positive for metastatic ductal carcinoma.  2/8/2022 she underwent bilateral mastectomies by Dr. Barahona with immediate reconstruction by Dr. Tim Brown.  He placed tissue expanders in the prepectoral position with the use of AlloDerm.  The left breast was negative.  Pathology from the right breast revealed multifocal invasive ductal adenocarcinoma, grade 2.  Tumor sizes were 1.1 cm, 0.8 cm and 0.4 cm. Margins were negative by 7 mm from the superficial margin.  5/5 lymph nodes were positive for tumor in the right axilla.  There was focal extracapsular extension of 1 mm.  -SG              Lymphedema Assessment    Lymphedema Classification RUE:; at risk/stage 0  -SG      Lymphedema Cancer Related Sx right; axillary dissection; bilateral; simple mastectomy; reconstructive  -SG      Lymphedema Surgery Comments 2/8/2022  -SG      Lymph Nodes Removed # 5  -SG      Positive Lymph Nodes # 5  -SG      Chemo Received --  Meeting w/ Dr. Alarcon at 1:30  -SG      Radiation Therapy Received --  Meeting w/   Eliezer today  -SG      Infections or Cellulitis? no  -SG              Posture/Observations    Posture- WNL Posture is WNL  -SG              General ROM    GENERAL ROM COMMENTS Pt post op mastectomy/ALND/reconstruction and is limited in the ROM that she is allowed to complete right now w/ her BUEs. Once she has her other two drains removed next week, she will be allowed to move her BUEs more. Currently, she is restricted to below shoulder height movement which is WNL.  -SG              MMT (Manual Muscle Testing)    General MMT Comments Generalized weakness 1 week post op  -SG              Lymphedema Edema Assessment    Edema Assessment Comment Currently, pt does not demo any edema. She still has drains in, so once those are removed next week we will continue to monitor fluid  -SG              Skin Changes/Observations    Location/Assessment Upper Extremity; Upper Quadrant  -SG      Upper Extremity Conditions bilateral:; normal  -SG      Upper Extremity Color/Pigment bilateral:; normal  -SG      Skin Observations Comment Bandages and drains still intact  -SG              Lymphedema Sensation    Lymphedema Sensation Reports normal  -SG              L-Dex Bioimpedence Screening    L-Dex Measurement Extremity RUE  -SG      L-Dex Patient Position Standing  -SG      L-Dex UE Dominate Side Right  -SG      L-Dex UE At Risk Side Right  -SG      L-Dex UE Pre Surgical Value No  -SG      L-Dex UE Score -2.4  -SG      L-Dex UE Comment The patient had a baseline SOZO measurement which I reviewed today. The score is -2.4 which is in normal range, see scanned to EMR. Bioimpedance spectroscopy helps identify the onset of lymphedema in an arm or leg before patients experience noticeable swelling. Research has shown that the early detection of lymphedema using L-Dex combined with treatment can reduce progression to chronic lymphedema by 95% in breast cancer patients. Whenever possible, patients are tested for baseline L-Dex score  "before cancer treatment begins and then are reassessed during regular follow-up visits using the SOZO device. Otherwise, this can be started postoperatively and continued during regular follow-up visits. If the patient’s L-Dex score increases above normal levels, that is a sign that lymphedema is developing and a referral is made to occupational/physical therapy for further evaluation and early compression treatment. Lymphedema assessment with the SOZO L-Dex score is recommended to be done every 3 months for the first 3 years and then every 6 months for years 4 and 5 followed by annually afterwards.  -SG      Skeletal Muscle Mass (%) 29 %  -SG      Fat Mass (%) 32.1 %  -SG      Hy-dex -11.9  -SG      Height 165.1 cm (65\")  -SG      Weight 56.7 kg (125 lb)  -SG      BMI (Calculated) 20.8  -SG            User Key  (r) = Recorded By, (t) = Taken By, (c) = Cosigned By    Initials Name Provider Type    Sherly Rosales, OTR/L Occupational Therapist                        Therapy Education  Education Details: Pt provided w/ [facility] information packet, where she  will learn more about lymphedema. appropriate exercise/stretching post op breast surgery, etc. She was edu on Ldex score and biompedance.  Given: HEP, Symptoms/condition management, Posture/body mechanics  Program: New  How Provided: Verbal, Demonstration  Provided to: Patient  Level of Understanding: Verbalized         OT Goals     Row Name 02/16/22 1539          OT Short Term Goals    STG Date to Achieve 03/18/22  -SG     STG 1 Pt demonstrates awareness of post-operative movement restrictions and HEP to facilitate lymphatic regeneration and reduce the risk of seroma formation, axillary web syndrome, and lymphedema while ensuring shoulder joint mobility.  -SG     STG 2 Pt demonstrates understanding of post-operative basic lymphedema precautions.  -SG     STG 3 Pt will remain in normal range w/ follow up Ldex measurements to ensure there is no lymphedema development. 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 ROM, postural, and LDex measurements were taken today to be compared to measurements retaken 3-4 weeks post surgery, once  drains are removed, or if any signs of swelling/lymphedema are present. At that time, any reduced movement, decline in function, or postural issues will be addressed with skilled care and new goals will be established.  Personal risk factors for lymphedema post-operatively for the R upper extremity and trunk quadrant were also assessed today and basic lymphedema precautions were discussed. A more detailed discussion regarding personal lymphedema risk factors will take place once the number of lymph nodes removed and the plan for further medical care is known.  -SG     Please refer to paper survey for additional self-reported information Yes  -SG     OT Diagnosis BrCA  -SG     OT Rehab Potential Excellent  -SG     Patient/caregiver participated in establishment of treatment plan and goals Yes  -SG     Patient would benefit from skilled therapy intervention Yes  -SG            OT Plan    Planned CPT's? OT EVAL MOD COMPLEXITY: 04283; OT THER ACT EA 15 MIN: [id number]; OT THER PROC EA 15 MIN: [id number]; OT SELF CARE/MGMT/TRAIN 15 MIN: 43227; OT MANUAL THERAPY EA 15 MIN: 84423; OT BIS XTRACELL FLUID ANALYSIS: 18591  -SG     Planned Therapy Interventions (Optional Details) home exercise program; joint mobilization; manual therapy techniques; patient/family education; postural re-education; ROM (Range of Motion);  strengthening; stretching  -SG     OT Plan Comments Dr. Velez may return for re-evaluation measurements to be compared to measurements taken today in a few weeks once drains are removed. In addition, she will be examined for possible post-BrCA surgery sequelae such as axillary web syndrome, scar adhesions, edema, worsened posture, scapular winging, pain, and reduced ROM and function. At that time, a future plan and goals will be established and skilled care continued if indicated. Currently, Dr. Velez has been provided with information for healing after breast surgery including lymphedema education, safe exercising/stretching, etc. in order to facilitate recovery and reduce the risk of post-operative sequelae.  -SG           User Key  (r) = Recorded By, (t) = Taken By, (c) = Cosigned By    Initials Name Provider Type    Sherly Rosales OTR/L Occupational Therapist                Outcome Measure Options: Quick DASH  Quick DASH  Open a tight or new jar.: Mild Difficulty  Do heavy household chores (e.g., wash walls, wash floors): Severe Difficulty  Carry a shopping bag or briefcase: Mild Difficulty  Wash your back: Mild Difficulty  Use a knife to cut food: No Difficulty  Recreational activities in which you take some force or impact through your arm, should or hand (e.g. golf, hammering, tennis, etc.): Severe Difficulty  During the past week, to what extent has your arm, shoulder, or hand problem interfered with your normal social activites with family, friends, neighbors or groups?: Quite a bit  During the past week, were you limited in your work or other regular daily activities as a result of your arm, shoulder or hand problem?: Very limited  Arm, Shoulder, or hand pain: Moderate  Tingling (pins and needles) in your arm, shoulder, or hand: Mild  During the past week, how much difficulty have you had sleeping because of the pain in your arm, shoulder or hand?: Mild Difficulty  Number of Questions  Answered: 11  Quick DASH Score: 43.18         Time Calculation:   OT Start Time: 1130     Therapy Charges for Today     Code Description Service Date Service Provider Modifiers Qty    57317769634 HC OT BIS XTRACELL FLUID ANALYSIS 2/16/2022 Sherly Lomas, OTR/L GO 1    92111615722 HC OT EVAL MOD COMPLEXITY 2 2/16/2022 Sherly oLmas OTR/L GO 1                    Sherly Lomas OTR/L  2/16/2022

## 2022-02-16 NOTE — PROGRESS NOTES
CONSULTATION NOTE    NAME:      Celsa Velez  :                                                          1975  DATE OF CONSULTATION:                       22  REQUESTING PHYSICIAN:                   Claudia Barahona MD  REASON FOR CONSULTATION:           Cancer Staging  Malignant neoplasm of right breast in female, estrogen receptor positive (HCC)  Staging form: Breast, AJCC 8th Edition  - Clinical: Stage IB (cT1c, cN1, cM0, G2, ER+, AZ+, HER2-) - Signed by Tana Alarcon MD on 2021  - Pathologic: Stage IB (pT1c, pN2a, cM0, G2, ER+, AZ+, HER2-) - Signed by Tana Alarcon MD on 2/15/2022           BRIEF HISTORY: 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 female found on mammogram to have a suspicious 1.3 cm irregular mass in the right 2:00 subareolar region.  Other nodular asymmetries were thought to be cysts and benign appearing oval masses were consistent with fibrocystic change.  She had unilateral right axillary adenopathy.   Ultrasound-guided biopsy of the breast mass was positive for invasive ductal carcinoma, histologic grade 2 with intermediate grade DCIS.  The tumor was ER/AZ positive HER-2/kassie negative.  Biopsy of the right 2.2 cm axillary lymph node was positive for metastatic ductal carcinoma.  2022 she underwent bilateral mastectomies by Dr. Barahona with immediate reconstruction by Dr. Tim Brown.  He placed tissue expanders in the prepectoral position with the use of AlloDerm.  The left breast was negative.  Pathology from the right breast revealed multifocal invasive ductal adenocarcinoma, grade 2.  Tumor sizes were 1.1 cm, 0.8 cm and 0.4 cm. Margins were negative by 7 mm from the superficial margin.  5/5 lymph nodes were positive for tumor in the right axilla.  There was focal extracapsular extension of 1 mm.  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  She is accompanied  by her  who is in sales.      Age at menarche:  9  Age at menopause:  n/a  Hormone replacement therapy:  no  Personal history of breast cancer:  no  Family history of breast cancer:  Yes, mother, paternal gm and cousin  Radiation to chest before age of 30:  no  Age of first live birth:  29      BMI:  Body mass index is 21.28 kg/m².      Social History     Substance and Sexual Activity   Alcohol Use Yes    Comment: SOCIAL       Allergies   Allergen Reactions   • Hydrocodone Nausea And Vomiting     Pt states she is unsure if reaction was due to waking up from anesthesia or from the pain med   • Penicillins Hives       Social History     Tobacco Use   • Smoking status: Never Smoker   • Smokeless tobacco: Never Used   Vaping Use   • Vaping Use: Never used   Substance Use Topics   • Alcohol use: Yes     Comment: SOCIAL   • Drug use: No         Past Medical History:   Diagnosis Date   • Acute sinusitis    • Allergic    • Arthritis    • JRA (juvenile rheumatoid arthritis) ([facility])    • Malignant neoplasm of right breast in female, estrogen receptor positive ([facility]) 12/22/2021    Wild Breast Mastectomies on 2/8/22   • Seizure disorder ([facility])     Possibly puberty/hormonal related; has not had a seizure since her teens   • UTI (urinary tract infection)     Last Impression: 02 Nov 2014  Send urine for culture. Treat with cipro until results are  available.  Almaz Cavazos (Internal Medicine)       family history includes Arthritis in an other family member; Breast cancer in her maternal cousin; Breast cancer (age of onset: 68) in her mother; Breast cancer (age of onset: 78) in her paternal grandmother; Cancer in an other family member; Colon cancer in her father; Kidney cancer in her paternal grandfather; Lung cancer in her maternal grandfather; Migraines in an other family member; Prostate cancer in her father.     Past Surgical History:   Procedure Laterality Date   • BREAST BIOPSY Right 2014    us bx   • BREAST  RECONSTRUCTION, BREAST TISSUE EXPANDER INSERTION Bilateral 2/8/2022    Procedure: IMMEDIATE BILATERAL BREAST RECONSTRUCTION WITH TISSUE EXPANDER;  Surgeon: Tim Brown MD;  Location: [facility] OR;  Service: Plastics;  Laterality: Bilateral;   • COLONOSCOPY     • DILATION AND CURETTAGE, DIAGNOSTIC / THERAPEUTIC      Dilation And Curettage Of Cervical Stump   • KNEE SURGERY     • MASTECTOMY     • MASTECTOMY W/ SENTINEL NODE BIOPSY N/A 2/8/2022    Procedure: BREAST MASTECTOMY WITH RIGHT SENTINEL NODE BIOPSY, LEFT PROPHYLATIC SKIN SPARRING MASTECTOMY;  Surgeon: Claudia Barahona MD;  Location: [facility] OR;  Service: General;  Laterality: N/A;   • US GUIDED LYMPH NODE BIOPSY  12/13/2021        Review of Systems   All other systems reviewed and are negative.          Objective   VITAL SIGNS:   Vitals:    02/16/22 1206   Weight: 58 kg (127 lb 14.4 oz)   PainSc: 0-No pain        KPS      90%    Physical Exam  Vitals reviewed.   Constitutional:       General: She is not in acute distress.     Appearance: She is normal weight.   Cardiovascular:      Rate and Rhythm: Normal rate and regular rhythm.      Pulses: Normal pulses.      Heart sounds: Normal heart sounds.   Musculoskeletal:      Cervical back: Neck supple.   Neurological:      Mental Status: She is alert.     The breast exam reveals the breasts are surgically absent.  She has 2 drains remaining.  The incisions are healing well.           The following portions of the patient's history were reviewed and updated as appropriate: allergies, current medications, past family history, past medical history, past social history, past surgical history and problem list.    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 female who underwent bilateral mastectomies by Dr. Barahona with immediate reconstruction by Dr. Tim Brown.  He placed tissue expanders in the prepectoral position with the use of AlloDerm.  The left breast was negative.  Pathology  from the right breast revealed multifocal invasive ductal adenocarcinoma, grade 2.  Tumor sizes were 1.1 cm, 0.8 cm and 0.4 cm. Margins were negative by 7 mm from the superficial margin.  5/5 lymph nodes were positive for tumor in the right axilla.  There was focal extracapsular extension of 1 mm.       RECOMMENDATIONS: Dr. Velez had multiple positive lymph nodes.  I recommend post operative radiation to decrease the risk of local recurrence.  The pros and cons, risks and benefits of treatment were discussed.  She has an appointment with Dr. Tana Alarcon today to discuss chemotherapy.  When it is time to return for radiation she will call one of our nurses, Adilene Adan to schedule reevaluation and simulation.  Anticipate 45 Hong in 25 fractions to the chest wall/implants and supraclavicular region to encompass the area lymph nodes.  Is a pleasure to meet Dr. Velez and her .               Roxana Martins MD    Total time of patient care on day of service including time prior to, face to face with patient, and following visit spent in reviewing records, lab results, imaging studies, discussion with patient, and documentation/charting was > 45 minutes.    Errors in dictation may reflect use of voice recognition software and not all errors in transcription may have been detected prior to signing.

## 2022-02-23 ENCOUNTER — TELEPHONE (OUTPATIENT)
Dept: ONCOLOGY | Facility: CLINIC | Age: 47
End: 2022-02-23

## 2022-02-23 ENCOUNTER — HOSPITAL ENCOUNTER (OUTPATIENT)
Dept: CARDIOLOGY | Facility: HOSPITAL | Age: 47
Discharge: HOME | End: 2022-02-23
Admitting: INTERNAL MEDICINE

## 2022-02-23 VITALS — BODY MASS INDEX: 21.34 KG/M2 | HEIGHT: 65 IN | WEIGHT: 128.09 LBS

## 2022-02-23 DIAGNOSIS — Z17.0 MALIGNANT NEOPLASM OF RIGHT BREAST IN FEMALE, ESTROGEN RECEPTOR POSITIVE, UNSPECIFIED SITE OF BREAST: ICD-10-CM

## 2022-02-23 DIAGNOSIS — T45.1X5A: ICD-10-CM

## 2022-02-23 DIAGNOSIS — I42.7: ICD-10-CM

## 2022-02-23 DIAGNOSIS — C50.911 MALIGNANT NEOPLASM OF RIGHT BREAST IN FEMALE, ESTROGEN RECEPTOR POSITIVE, UNSPECIFIED SITE OF BREAST: ICD-10-CM

## 2022-02-23 LAB
BH CV ECHO MEAS - AO MAX PG (FULL): 2.9 MMHG
BH CV ECHO MEAS - AO MAX PG: 4.8 MMHG
BH CV ECHO MEAS - AO MEAN PG (FULL): 1.3 MMHG
BH CV ECHO MEAS - AO MEAN PG: 2.3 MMHG
BH CV ECHO MEAS - AO ROOT AREA (BSA CORRECTED): 1.7
BH CV ECHO MEAS - AO ROOT AREA: 5.7 CM^2
BH CV ECHO MEAS - AO ROOT DIAM: 2.7 CM
BH CV ECHO MEAS - AO V2 MAX: 109.7 CM/SEC
BH CV ECHO MEAS - AO V2 MEAN: 69.7 CM/SEC
BH CV ECHO MEAS - AO V2 VTI: 20.1 CM
BH CV ECHO MEAS - ASC AORTA: 2.5 CM
BH CV ECHO MEAS - AVA(I,A): 2.4 CM^2
BH CV ECHO MEAS - AVA(I,D): 2.4 CM^2
BH CV ECHO MEAS - AVA(V,A): 2 CM^2
BH CV ECHO MEAS - AVA(V,D): 2 CM^2
BH CV ECHO MEAS - BSA(HAYCOCK): 1.6 M^2
BH CV ECHO MEAS - BSA: 1.6 M^2
BH CV ECHO MEAS - BZI_BMI: 21.3 KILOGRAMS/M^2
BH CV ECHO MEAS - BZI_METRIC_HEIGHT: 165.1 CM
BH CV ECHO MEAS - BZI_METRIC_WEIGHT: 58.1 KG
BH CV ECHO MEAS - EDV(CUBED): 72.3 ML
BH CV ECHO MEAS - EDV(MOD-SP2): 42 ML
BH CV ECHO MEAS - EDV(MOD-SP4): 38 ML
BH CV ECHO MEAS - EDV(TEICH): 77.1 ML
BH CV ECHO MEAS - EF(MOD-BP): 65 %
BH CV ECHO MEAS - EF(MOD-SP2): 64.3 %
BH CV ECHO MEAS - EF(MOD-SP4): 65.8 %
BH CV ECHO MEAS - ESV(MOD-SP2): 15 ML
BH CV ECHO MEAS - ESV(MOD-SP4): 13 ML
BH CV ECHO MEAS - IVS/LVPW: 1
BH CV ECHO MEAS - IVSD: 0.77 CM
BH CV ECHO MEAS - LA DIMENSION: 2.9 CM
BH CV ECHO MEAS - LA/AO: 1.1
BH CV ECHO MEAS - LAT PEAK E' VEL: 14.6 CM/SEC
BH CV ECHO MEAS - LATERAL E/E' RATIO: 5.6
BH CV ECHO MEAS - LV DIASTOLIC VOL/BSA (35-75): 23.2 ML/M^2
BH CV ECHO MEAS - LV IVRT: 0.07 SEC
BH CV ECHO MEAS - LV MASS(C)D: 94.2 GRAMS
BH CV ECHO MEAS - LV MASS(C)DI: 57.6 GRAMS/M^2
BH CV ECHO MEAS - LV MAX PG: 1.9 MMHG
BH CV ECHO MEAS - LV MEAN PG: 1 MMHG
BH CV ECHO MEAS - LV SYSTOLIC VOL/BSA (12-30): 7.9 ML/M^2
BH CV ECHO MEAS - LV V1 MAX: 69.6 CM/SEC
BH CV ECHO MEAS - LV V1 MEAN: 42.4 CM/SEC
BH CV ECHO MEAS - LV V1 VTI: 15.2 CM
BH CV ECHO MEAS - LVIDD: 4.2 CM
BH CV ECHO MEAS - LVLD AP2: 6.8 CM
BH CV ECHO MEAS - LVLD AP4: 6 CM
BH CV ECHO MEAS - LVLS AP2: 6.1 CM
BH CV ECHO MEAS - LVLS AP4: 5.3 CM
BH CV ECHO MEAS - LVOT AREA (M): 3.1 CM^2
BH CV ECHO MEAS - LVOT AREA: 3.2 CM^2
BH CV ECHO MEAS - LVOT DIAM: 2 CM
BH CV ECHO MEAS - LVPWD: 0.76 CM
BH CV ECHO MEAS - MED PEAK E' VEL: 11.1 CM/SEC
BH CV ECHO MEAS - MEDIAL E/E' RATIO: 7.3
BH CV ECHO MEAS - MV A MAX VEL: 44.4 CM/SEC
BH CV ECHO MEAS - MV DEC SLOPE: 409.2 CM/SEC^2
BH CV ECHO MEAS - MV DEC TIME: 0.21 SEC
BH CV ECHO MEAS - MV E MAX VEL: 82.4 CM/SEC
BH CV ECHO MEAS - MV E/A: 1.9
BH CV ECHO MEAS - MV P1/2T MAX VEL: 103.2 CM/SEC
BH CV ECHO MEAS - MV P1/2T: 73.8 MSEC
BH CV ECHO MEAS - MVA P1/2T LCG: 2.1 CM^2
BH CV ECHO MEAS - MVA(P1/2T): 3 CM^2
BH CV ECHO MEAS - PA ACC SLOPE: 399.7 CM/SEC^2
BH CV ECHO MEAS - PA ACC TIME: 0.17 SEC
BH CV ECHO MEAS - PA PR(ACCEL): 4.5 MMHG
BH CV ECHO MEAS - RAP SYSTOLE: 8 MMHG
BH CV ECHO MEAS - RVDD: 2.3 CM
BH CV ECHO MEAS - RVSP: 21 MMHG
BH CV ECHO MEAS - SI(AO): 70.6 ML/M^2
BH CV ECHO MEAS - SI(LVOT): 29.7 ML/M^2
BH CV ECHO MEAS - SI(MOD-SP2): 16.5 ML/M^2
BH CV ECHO MEAS - SI(MOD-SP4): 15.3 ML/M^2
BH CV ECHO MEAS - SV(AO): 115.4 ML
BH CV ECHO MEAS - SV(LVOT): 48.6 ML
BH CV ECHO MEAS - SV(MOD-SP2): 27 ML
BH CV ECHO MEAS - SV(MOD-SP4): 25 ML
BH CV ECHO MEAS - TAPSE (>1.6): 2.4 CM
BH CV ECHO MEAS - TR MAX PG: 13 MMHG
BH CV ECHO MEAS - TR MAX VEL: 179.2 CM/SEC
BH CV ECHO MEASUREMENTS AVERAGE E/E' RATIO: 6.41
BH CV VAS BP LEFT ARM: NORMAL MMHG
BH CV XLRA - RV BASE: 3.3 CM
BH CV XLRA - RV LENGTH: 6.3 CM
BH CV XLRA - RV MID: 3 CM
BH CV XLRA - TDI S': 12 CM/SEC
MAXIMAL PREDICTED HEART RATE: 173 BPM
STRESS TARGET HR: 147 BPM

## 2022-02-23 PROCEDURE — 93306 TTE W/DOPPLER COMPLETE: CPT | Performed by: INTERNAL MEDICINE

## 2022-02-23 PROCEDURE — 93306 TTE W/DOPPLER COMPLETE: CPT

## 2022-02-23 NOTE — TELEPHONE ENCOUNTER
Caller: ANNABEL LE    Relationship to patient: SELF    Best call back number: [phone]    Patient is needing: TO LET US KNOW SHE WANTS TO GO WITH 2ND OPINION, DR. MCKINNEY IN [place]. SHE IS STILL WANTING SCAN DONE THIS AFTERNOON THOUGH IF POSSIBLE.

## 2022-02-23 NOTE — TELEPHONE ENCOUNTER
I called patient back and she said that she had already met with Dr Cummins yesterday.  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 She has an ECHO scheduled today.

## 2022-02-28 ENCOUNTER — TRANSCRIBE ORDERS (OUTPATIENT)
Dept: ADMINISTRATIVE | Facility: HOSPITAL | Age: 47
End: 2022-02-28

## 2022-02-28 DIAGNOSIS — Z01.818 PRE-OP TESTING: Primary | ICD-10-CM

## 2022-03-02 ENCOUNTER — OFFICE VISIT (OUTPATIENT)
Dept: CARDIOLOGY | Facility: CLINIC | Age: 47
End: 2022-03-02

## 2022-03-02 VITALS
SYSTOLIC BLOOD PRESSURE: 96 MMHG | BODY MASS INDEX: 20.16 KG/M2 | HEIGHT: 65 IN | HEART RATE: 67 BPM | WEIGHT: 121 LBS | OXYGEN SATURATION: 99 % | DIASTOLIC BLOOD PRESSURE: 58 MMHG

## 2022-03-02 DIAGNOSIS — C50.911 MALIGNANT NEOPLASM OF RIGHT BREAST IN FEMALE, ESTROGEN RECEPTOR POSITIVE, UNSPECIFIED SITE OF BREAST: Primary | ICD-10-CM

## 2022-03-02 DIAGNOSIS — Z17.0 MALIGNANT NEOPLASM OF RIGHT BREAST IN FEMALE, ESTROGEN RECEPTOR POSITIVE, UNSPECIFIED SITE OF BREAST: Primary | ICD-10-CM

## 2022-03-02 PROCEDURE — 99244 OFF/OP CNSLTJ NEW/EST MOD 40: CPT | Performed by: INTERNAL MEDICINE

## 2022-03-02 PROCEDURE — 93000 ELECTROCARDIOGRAM COMPLETE: CPT | Performed by: INTERNAL MEDICINE

## 2022-03-02 NOTE — PROGRESS NOTES
[facility] Cardiology   Consult  Celsa Velez  1975    VISIT DATE:  03/02/22    PCP:   Yecenia Giang MD  [address] [place] [id number]        CC:  Establish Care (Consult)      Problem List:    1.  Estrogen positive, progesterone positive, HER-2/kassie negative right breast cancer  -Status post bilateral mastectomy  -Plan for radiation and adjuvant chemotherapy with anthracycline and Taxol  -Also recommended to be on abemaciclib which is a 6 CDK 4/6 inhibitor cardiac effects of this can be increased risk of thromboembolism and QT prolongation.    2.  No early family history of CAD    Echo February 2022:  · Left ventricular ejection fraction appears to be 61 - 65%. Left ventricular systolic function is normal.  · Left ventricular diastolic function was normal.  · Estimated right ventricular systolic pressure from tricuspid regurgitation is normal (<35 mmHg). Calculated right ventricular systolic pressure from tricuspid regurgitation is 21 mmHg.  · Strain cannot be calculated        History of Present Illness:  Celsa Velez  Is a 46 y.o. female with pertinent cardiac history detailed above.  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  She is about to begin treatment for a right-sided breast cancer regimen will include anthracycline and right-sided radiation.  It is HER-2 negative.  She does not have significant cardiac risk factors she is normotensive, previous lipid panel has shown good control, non-smoker, no premature history of CAD.  She swims twice weekly for exercise.  She denies any exertional dyspnea or chest pain.  EKG today reveals normal ST segments.  Blood pressure is low normal in the office today.  QTc is normal.  She will be receiving her chemotherapy at [facility] in [place]      Patient Active Problem List    Diagnosis Date Noted   • Malignant neoplasm of upper-inner quadrant of right female breast (HCC)  02/08/2022   • Malignant neoplasm of right breast in female, estrogen receptor positive (HCC) 12/22/2021   • Family history of breast cancer in mother 11/11/2019   • Hematuria 02/19/2018   • IBS (irritable bowel syndrome) 09/26/2016   • Vitamin D deficiency 09/26/2016       Allergies   Allergen Reactions   • Hydrocodone Nausea And Vomiting     Pt states she is unsure if reaction was due to waking up from anesthesia or from the pain med   • Penicillins Hives       Social History     Socioeconomic History   • Marital status:    Tobacco Use   • Smoking status: Never Smoker   • Smokeless tobacco: Never Used   Vaping Use   • Vaping Use: Never used   Substance and Sexual Activity   • Alcohol use: Not Currently     Alcohol/week: 0.0 standard drinks     Comment: SOCIAL   • Drug use: No   • Sexual activity: Not Currently     Partners: Male     Birth control/protection: Surgical       Family History   Problem Relation Age of Onset   • Colon cancer Father    • Prostate cancer Father    • Arthritis Other    • Cancer Other    • Migraines Other    • Breast cancer Mother 68   • Breast cancer Paternal Grandmother 78   • Breast cancer Maternal Cousin    • Lung cancer Maternal Grandfather    • Kidney cancer Paternal Grandfather    • Ovarian cancer Neg Hx        Current Medications:    Current Outpatient Medications:   •  Cyanocobalamin (B-12) 1000 MCG sublingual tablet, Place 1 tablet under the tongue Daily., Disp: , Rfl:   •  fish oil-omega-3 fatty acids 1000 MG capsule, Take 2 g by mouth Daily., Disp: , Rfl:   •  levocetirizine (XYZAL) 5 MG tablet, Take 5 mg by mouth Every Evening., Disp: , Rfl:   •  ondansetron ODT (ZOFRAN-ODT) 4 MG disintegrating tablet, , Disp: , Rfl:   •  TURMERIC PO, Take 2 tablets by mouth Daily. TURMERIC - QUERCETIN - BROMELAIN COMBO, Disp: , Rfl:   •  vitamin B-6 (PYRIDOXINE) 100 MG tablet, Take 100 mg by mouth Daily., Disp: , Rfl:   •  vitamin C (ASCORBIC ACID) 250 MG tablet, Take 500 mg by mouth  "Daily., Disp: , Rfl:   •  vitamin D (ERGOCALCIFEROL) 1.25 MG (52264 UT) capsule capsule, Take 1 capsule by mouth 1 (One) Time Per Week., Disp: 5 capsule, Rfl: 4  •  oxyCODONE (ROXICODONE) 5 MG immediate release tablet, , Disp: , Rfl:      Review of Systems   Cardiovascular: Negative for chest pain, dyspnea on exertion, irregular heartbeat, leg swelling, near-syncope, palpitations and syncope.   Respiratory: Negative for shortness of breath.        Vitals:    03/02/22 0857   BP: 96/58   BP Location: Left arm   Patient Position: Sitting   Pulse: 67   SpO2: 99%   Weight: 54.9 kg (121 lb)   Height: 165.1 cm (65\")       Physical Exam  Constitutional:       Appearance: Normal appearance.   Neck:      Vascular: No carotid bruit.   Cardiovascular:      Rate and Rhythm: Normal rate and regular rhythm.      Pulses: Normal pulses.      Heart sounds: Normal heart sounds.   Pulmonary:      Effort: Pulmonary effort is normal.      Breath sounds: Normal breath sounds.   Musculoskeletal:      Right lower leg: No edema.      Left lower leg: No edema.   Neurological:      General: No focal deficit present.      Mental Status: She is alert and oriented to person, place, and time.         Diagnostic Data:    ECG 12 Lead    Date/Time: 3/2/2022 9:10 AM  Performed by: Isaias Delacruz MD  Authorized by: Isaias Delacruz MD   Comparison: not compared with previous ECG   Previous ECG: no previous ECG available  Rhythm: sinus rhythm  Rate: normal  BPM: 65  Q waves: V1 and V2    QRS axis: normal    Clinical impression: non-specific ECG  Clinical impression comment: Q waves V1/V2, suspect lead placement related            Lab Results   Component Value Date    CHLPL 156 11/11/2019    TRIG 61 11/16/2020    HDL 76 (H) 11/16/2020     Lab Results   Component Value Date    GLUCOSE 117 (H) 02/01/2022    BUN 16 02/01/2022    CREATININE 0.77 02/01/2022     02/01/2022    K 4.2 02/01/2022     02/01/2022    CO2 24.0 02/01/2022 "     Lab Results   Component Value Date    HGBA1C 4.90 02/01/2022     Lab Results   Component Value Date    WBC 6.22 02/01/2022    HGB 13.4 02/01/2022    HCT 39.6 02/01/2022     02/01/2022       Assessment:   Diagnosis Plan   1. Malignant neoplasm of right breast in female, estrogen receptor positive, unspecified site of breast (HCC)  ECG 12 Lead       Plan:      1.  Estrogen positive, progesterone positive, HER-2/kassie negative right breast cancer  -Status post bilateral mastectomy  -Plan for radiation and adjuvant chemotherapy with anthracycline and Taxol  -Also recommended to be on abemaciclib which is a 6 CDK 4/6 inhibitor cardiac effects of this can be increased risk of thromboembolism and QT prolongation.  QTC WNL  -EF is normal at 61 to 65%, strain cannot be calculated  -We will need follow-up echo when she completes her anthracycline course or when she reaches a 250 mg/meter2 dose  -Would not recommend any pretreatment with cardioprotective medications at this time given her low risk profile.    2.  Lipid panel November 2020 LDL 65      Will f/u 6 months, she anticipates she'll be completed with chemotherapy at that time and we can repeat echocardiogram.    Isaias Delacruz MD [facility]

## 2022-03-08 ENCOUNTER — LAB (OUTPATIENT)
Dept: PREADMISSION TESTING | Facility: HOSPITAL | Age: 47
End: 2022-03-08

## 2022-03-08 DIAGNOSIS — Z01.818 PRE-OP TESTING: ICD-10-CM

## 2022-03-08 LAB — SARS-COV-2 RNA PNL SPEC NAA+PROBE: NOT DETECTED

## 2022-03-08 PROCEDURE — C9803 HOPD COVID-19 SPEC COLLECT: HCPCS

## 2022-03-08 PROCEDURE — U0004 COV-19 TEST NON-CDC HGH THRU: HCPCS

## 2022-03-09 ENCOUNTER — TRANSCRIBE ORDERS (OUTPATIENT)
Dept: ADMINISTRATIVE | Facility: HOSPITAL | Age: 47
End: 2022-03-09

## 2022-03-09 ENCOUNTER — APPOINTMENT (OUTPATIENT)
Dept: GENERAL RADIOLOGY | Facility: HOSPITAL | Age: 47
End: 2022-03-09

## 2022-03-09 DIAGNOSIS — C50.211 MALIGNANT NEOPLASM OF UPPER-INNER QUADRANT OF RIGHT FEMALE BREAST, UNSPECIFIED ESTROGEN RECEPTOR STATUS: Primary | ICD-10-CM

## 2022-03-10 ENCOUNTER — HOSPITAL ENCOUNTER (OUTPATIENT)
Dept: GENERAL RADIOLOGY | Facility: HOSPITAL | Age: 47
Discharge: HOME | End: 2022-03-10

## 2022-03-10 DIAGNOSIS — C50.211 MALIGNANT NEOPLASM OF UPPER-INNER QUADRANT OF RIGHT FEMALE BREAST, UNSPECIFIED ESTROGEN RECEPTOR STATUS: ICD-10-CM

## 2022-03-10 PROCEDURE — 71045 X-RAY EXAM CHEST 1 VIEW: CPT

## 2022-03-11 ENCOUNTER — NURSE NAVIGATOR (OUTPATIENT)
Dept: OTHER | Facility: HOSPITAL | Age: 47
End: 2022-03-11

## 2022-03-11 NOTE — PROGRESS NOTES
Patient called to say that she feels uncomfortable and has difficulty getting deep breath when she lays down and this is on the left side- she had port placed yesterday - the post port placement chest Xray looked okay - I will notify Dr. GARCIA.

## 2022-03-11 NOTE — PROGRESS NOTES
I spoke to Dr GARCIA - he said that if the patient will not lay on her left side would help - and that if she gets short of breath and or has difficulty breathing to dome to the ER so that they can access her and get a chest XRAY - Patient verbalized understanding

## 2022-03-29 ENCOUNTER — CLINICAL SUPPORT (OUTPATIENT)
Dept: GENETICS | Facility: HOSPITAL | Age: 47
End: 2022-03-29

## 2022-03-29 DIAGNOSIS — C50.911 MALIGNANT NEOPLASM OF RIGHT BREAST IN FEMALE, ESTROGEN RECEPTOR POSITIVE, UNSPECIFIED SITE OF BREAST: ICD-10-CM

## 2022-03-29 DIAGNOSIS — Z80.42 FAMILY HISTORY OF MALIGNANT NEOPLASM OF PROSTATE: ICD-10-CM

## 2022-03-29 DIAGNOSIS — Z80.3 FAMILY HISTORY OF MALIGNANT NEOPLASM OF BREAST: ICD-10-CM

## 2022-03-29 DIAGNOSIS — Z17.0 MALIGNANT NEOPLASM OF RIGHT BREAST IN FEMALE, ESTROGEN RECEPTOR POSITIVE, UNSPECIFIED SITE OF BREAST: ICD-10-CM

## 2022-03-29 DIAGNOSIS — Z13.79 GENETIC TESTING: Primary | ICD-10-CM

## 2022-03-29 PROCEDURE — 96040: CPT | Performed by: GENETIC COUNSELOR, MS

## 2022-03-31 ENCOUNTER — NURSE NAVIGATOR (OUTPATIENT)
Dept: OTHER | Facility: HOSPITAL | Age: 47
End: 2022-03-31

## 2022-03-31 NOTE — PROGRESS NOTES
I received a voice message from patient regarding a concern with her port - I returned her call and left her a voice message to call me

## 2022-03-31 NOTE — PROGRESS NOTES
Patient reports that her port worked fine for her chemo infusion 3/25/2022 - but since then it has felt sore- she has noticed a puffy area above it near her clavicle. She states that it is not red or oozing - she does not have a fever,  feeling of malaise or shortness of breath. I made her an appointment to see Dr GARCIA Monday 4/4/2022 2 11:00 in his office- Patient agreed with this plan - we also discussed if any of the above symptoms should start to let us know or go to the ER. She verbalized understanding.  .

## 2022-04-01 NOTE — PROGRESS NOTES
Celsa Velez, a 46-year-old female, was seen for genetic counseling due to her personal and family history of breast cancer. Ms. Velez was recently diagnosed with a right breast cancer at age 46 and had a bilateral mastectomy. She has colonoscopies every five years and does not have a personal history of colon polyps. She retains her uterus and ovaries. Ms. Velez was interested in discussing her risk for a hereditary cancer syndrome and genetic testing options.     PERTINENT FAMILY HISTORY: (See attached pedigree)   Mother:    Breast cancer, 68  Father:     Prostate cancer, 60s  Pat. Grandmother:   Breast cancer, 77  Pat. Great Aunt:   Breast cancer  Pat. Grandfather:   Kidney cancer, 66  Pat. Great Grandfather:  Colon cancer  Mat. Grandfather:   Lung cancer, 66  Mat. 1st cousin once removed (x2): Breast cancer    We do not have medical records regarding any of these diagnoses.      RISK ASSESSMENT:  Ms. Velez’s personal and family history of breast cancer raised the question of a hereditary cancer syndrome. We discussed BRCA1/2 testing as well as the option of pursuing a panel that would test for other genes that are also known to impact cancer risk.  Ms. Velez clearly meets NCCN guidelines criteria for BRCA1/2 testing based on her personal history of breast cancer diagnosed before age 50 and family history of breast cancer.  These risk assessments are based on the family history information provided at the time of the appointment, and could change in the future should new information be obtained.    GENETIC COUNSELING: (30 minutes) We reviewed the family history information in detail. Cases of cancer follow three general patterns: sporadic, familial, and hereditary.  While most cancer is sporadic, some cases appear to occur in family clusters.  These cases are said to be familial and account for 10-20% of cancer cases.  Familial cases may be due to a combination of shared genes and  environmental factors among family members.  In even fewer families, the cancer is said to be inherited, and the genes responsible for the cancer are known.  Family histories typical of hereditary cancer syndromes usually include multiple first- and second-degree relatives diagnosed with cancer types that define a syndrome.  These cases tend to be diagnosed at younger-than-expected ages and can be bilateral or multifocal.  The cancer in these families follows an autosomal dominant inheritance pattern, which indicates the likely presence of a mutation in a cancer susceptibility gene.  Children and siblings of an individual believed to carry this mutation have a 50% chance of inheriting that mutation, thereby inheriting the increased risk to develop cancer.  These mutations can be passed down from the maternal or the paternal lineage.    Hereditary breast cancer accounts for 5-10% of all cases of breast cancer.  A significant proportion of hereditary breast and ovarian cancer can be attributed to mutations in the BRCA1 and BRCA2 genes.  Mutations in these genes confer an increased risk for breast cancer, ovarian cancer, male breast cancer, prostate cancer, and pancreatic cancer. Women with a BRCA1 or BRCA2 mutation who have already been diagnosed with breast cancer have a 40-60% lifetime risk of a second breast cancer. Women with a BRCA1 or BRCA2 mutation have up to a 44% risk of ovarian cancer.  We discussed that there are other genes that are known to be associated with an increased risk for cancer and the comprehensive multigene panel approach to testing. Some of these genes have well defined cancer risks and established management guidelines.  Other genes that can be tested for have been more recently described, and there may be less data regarding the risks and therefore may not have established management guidelines.    GENETIC TESTING:  The risks, benefits and limitations of genetic testing and implications for  clinical management following testing were reviewed.  DNA test results can influence decisions regarding screening, prevention and surgical management.  Genetic testing can have significant psychological implications for both individuals and families.  Also discussed was the possibility of employment and insurance discrimination based on genetic test results and the laws in place to prevent this ([place]), as well as the limitations of these laws.    We discussed panel testing, which would involve testing multiple genes simultaneously that are associated with increased cancer risk. The benefits and limitations of genetic testing were discussed, as well as the implications of a positive or negative test result. In some cases, genetic test results may be informative or may be ambiguous due to the identification of a genetic variant of uncertain significance (VUS). These variants may or may not be associated with an increased cancer risk.  With multigene panel testing, it is not uncommon for a variant of uncertain significance (VUS) to be identified.  If a VUS is identified, testing family members is typically not recommended and screening recommendations are made based on the family history.  The laboratories that perform genetic testing work to reclassify the VUS and send out an amended report if and when a VUS is reclassified.  The majority of variant findings are ultimately reclassified to a negative result.  Given her personal and family history, a negative test result would not eliminate all cancer risk to her relatives, although the risk would not be as high as it would with positive genetic testing.  Ms. Velez is interested in pursuing testing in the future, but opted to not pursue testing right now due to concerns obtaining additional insurance. She is going to consider the information discussed, and plans to contact us when she decides to pursue testing and we can coordinate a sample collection at that  time.    CLINICAL MANAGEMENT GUIDELINES: Ms. Velez’s surveillance and management should be determined by her oncology team.  Her female relatives may have an increased lifetime risk for breast cancer based on family history.  Given the possible increased risk, options available to individuals with a high lifetime risk for breast cancer were briefly discussed.  Surveillance for individuals with a high lifetime risk of breast cancer (high risk is typically defined as >20%, in comparison to the average risk of 12%), based on NCCN guidelines, would consist of semi-annual clinical breast exams and monthly self-breast exams starting by age 18 and annual mammography starting 10 years younger than the earliest diagnosis in the family, or age 40, whichever is earliest.  According to an American Cancer Society expert panel and NCCN guidelines, annual breast MRI should be offered to women whose lifetime risk of breast cancer is 20-25 percent or more, typically beginning at the same age as mammography.  Relatives may have a personalized risk assessment performed to quantify their breast cancer risk utilizing risk models such as the Tyrer-Cuzick model. These recommendations could change should Ms. Velez test positive for a hereditary cancer syndrome in the future.     PLAN: Genetic counseling remains available to Ms. Velez and her family. She opted to not pursue genetic testing today. Testing remains available to her at any time in the future. Ms. Velez is encouraged to contact our office with any questions or concerns at [phone].        Almaz Seaman MS, [facility] [place], [facility]  Licensed Certified Genetic Counselor       Cc: MD Jonathan Centeno MD

## 2022-04-04 ENCOUNTER — TRANSCRIBE ORDERS (OUTPATIENT)
Dept: OCCUPATIONAL THERAPY | Facility: HOSPITAL | Age: 47
End: 2022-04-04

## 2022-04-04 DIAGNOSIS — C50.211 MALIGNANT NEOPLASM OF UPPER-INNER QUADRANT OF RIGHT FEMALE BREAST, UNSPECIFIED ESTROGEN RECEPTOR STATUS: Primary | ICD-10-CM

## 2022-04-07 ENCOUNTER — HOSPITAL ENCOUNTER (OUTPATIENT)
Dept: OCCUPATIONAL THERAPY | Facility: HOSPITAL | Age: 47
Setting detail: THERAPIES SERIES
Discharge: HOME | End: 2022-04-07

## 2022-04-07 ENCOUNTER — DOCUMENTATION (OUTPATIENT)
Dept: OCCUPATIONAL THERAPY | Facility: HOSPITAL | Age: 47
End: 2022-04-07

## 2022-04-07 VITALS — WEIGHT: 121 LBS | BODY MASS INDEX: 20.16 KG/M2 | HEIGHT: 65 IN

## 2022-04-07 DIAGNOSIS — C50.911 MALIGNANT NEOPLASM OF RIGHT FEMALE BREAST, UNSPECIFIED ESTROGEN RECEPTOR STATUS, UNSPECIFIED SITE OF BREAST: Primary | ICD-10-CM

## 2022-04-07 PROCEDURE — 97166 OT EVAL MOD COMPLEX 45 MIN: CPT

## 2022-04-07 PROCEDURE — 93702 BIS XTRACELL FLUID ANALYSIS: CPT

## 2022-04-07 NOTE — THERAPY EVALUATION
Outpatient Occupational Therapy Ortho Initial Evaluation   [place]     Patient Name: Celsa Velez  : 1975  MRN: [id number]  Today's Date: 2022      Visit Date: 2022    Patient Active Problem List   Diagnosis   • IBS (irritable bowel syndrome)   • Vitamin D deficiency   • Hematuria   • Family history of breast cancer in mother   • Malignant neoplasm of right breast in female, estrogen receptor positive (HCC)   • Malignant neoplasm of upper-inner quadrant of right female breast (HCC)        Past Medical History:   Diagnosis Date   • Acute sinusitis    • Allergic    • Arthritis    • JRA (juvenile rheumatoid arthritis) (HCC)    • Malignant neoplasm of right breast in female, estrogen receptor positive (HCC) 2021    Wild Breast Mastectomies on 22   • Seizure disorder (HCC)     Possibly puberty/hormonal related; has not had a seizure since her teens   • UTI (urinary tract infection)     Last Impression: 2014  Send urine for culture. Treat with cipro until results are  available.  Almaz Cavazos (Internal Medicine)        Past Surgical History:   Procedure Laterality Date   • BREAST BIOPSY Right     us bx   • BREAST RECONSTRUCTION, BREAST TISSUE EXPANDER INSERTION Bilateral 2022    Procedure: IMMEDIATE BILATERAL BREAST RECONSTRUCTION WITH TISSUE EXPANDER;  Surgeon: Tim Brown MD;  Location: [facility] OR;  Service: Plastics;  Laterality: Bilateral;   • COLONOSCOPY     • DILATION AND CURETTAGE, DIAGNOSTIC / THERAPEUTIC      Dilation And Curettage Of Cervical Stump   • KNEE SURGERY     • MASTECTOMY     • MASTECTOMY W/ SENTINEL NODE BIOPSY N/A 2022    Procedure: BREAST MASTECTOMY WITH RIGHT SENTINEL NODE BIOPSY, LEFT PROPHYLATIC SKIN SPARRING MASTECTOMY;  Surgeon: Claudia Barahona MD;  Location: [facility] OR;  Service: General;  Laterality: N/A;   • US GUIDED LYMPH NODE BIOPSY  2021         Visit Dx:    ICD-10-CM ICD-9-CM   1. Malignant neoplasm of right  female breast, unspecified estrogen receptor status, unspecified site of breast ([facility])  C50.911 174.9        Patient History     Row Name 04/07/22 0800             History    Brief Description of Current Complaint 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 female found on mammogram to have a suspicious 1.3 cm irregular mass in the right 2:00 subareolar region.  Other nodular asymmetries were thought to be cysts and benign appearing oval masses were consistent with fibrocystic change.  She had unilateral right axillary adenopathy.   Ultrasound-guided biopsy of the breast mass was positive for invasive ductal carcinoma, histologic grade 2 with intermediate grade DCIS.  The tumor was ER/MN positive HER-2/kassie negative.  Biopsy of the right 2.2 cm axillary lymph node was positive for metastatic ductal carcinoma.  2/8/2022 she underwent bilateral mastectomies by Dr. Barahona with immediate reconstruction by Dr. Tim Brown.  He placed tissue expanders in the prepectoral position with the use of AlloDerm.  The left breast was negative.  Pathology from the right breast revealed multifocal invasive ductal adenocarcinoma, grade 2.  Tumor sizes were 1.1 cm, 0.8 cm and 0.4 cm. Margins were negative by 7 mm from the superficial margin.  5/5 lymph nodes were positive for tumor in the right axilla.  There was focal extracapsular extension of 1 mm. She is currently undergoing chemotherapy at [facility] in [place] (4 AC followed by 4 Taxol). Following, she will have adjuvant radiotherapy w/ Dr. Martins.  Plan for now is to have exchange surgery from expanders to implants approx 6 months following radiation.  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  She enjoys swimming for exercise and has been released by Dr. Brown to resume--she has noticed that she is having pain in her left shoulder for the past 2 weeks or so. This is limiting for her swimming and her work.  -SG      Patient/Caregiver  Goals Relieve pain;Return to prior level of function;Improve mobility;Improve strength;Know what to do to help the symptoms  -SG      Hand Dominance right-handed  -SG      Occupation/sports/leisure activities Pt works as   -SG              Pain     Pain Location Shoulder;Chest  -SG      Pain at Present 2  -SG      Pain at Best 0  -SG      Pain at Worst 7  -SG      Pain Description Tightness;Tender;Sore;Discomfort;Aching  -SG      What Performance Factors Make the Current Problem(s) WORSE? Freestyle swimming, painful arc test  -SG      What Performance Factors Make the Current Problem(s) BETTER? Rest  -SG      Difficulties at work? Yes  -SG      Difficulties with ADL's? Yes  -SG      Difficulties with recreational activities? Yes  -SG              Daily Activities    Pt Participated in POC and Goals Yes  -SG            User Key  (r) = Recorded By, (t) = Taken By, (c) = Cosigned By    Initials Name Provider Type    Sherly Rosales OTR/L Occupational Therapist                       Lymphedema     Row Name 04/07/22 0800             Subjective Pain    Able to rate subjective pain? yes  -SG      Pre-Treatment Pain Level 2  -SG      Post-Treatment Pain Level 2  -SG              Lymphedema Assessment    Lymphedema Classification RUE:;at risk/stage 0  -SG      Lymphedema Cancer Related Sx right;axillary dissection;bilateral;simple mastectomy;reconstructive  -SG      Lymphedema Surgery Comments 2/8/2022  -SG      Lymph Nodes Removed # 5  -SG      Positive Lymph Nodes # 5  -SG      Chemo Received yes  -SG      Chemo Treatments #/Timeframe Currently undergoing  -SG      Radiation Therapy Received --  Will complete following chemotherapy  -SG      Infections or Cellulitis? no  -SG              Posture/Observations    Posture- WNL Posture is WNL  -SG      Posture/Observations Comments Forward protective posture post mastectomy/reconstruction and port placement  -SG              General ROM    RT Upper Ext Rt  Shoulder ABduction;Rt Shoulder Flexion;Rt Shoulder External Rotation;Rt Shoulder Internal Rotation  -SG      LT Upper Ext Lt Shoulder ABduction;Lt Shoulder Flexion;Lt Shoulder External Rotation;Lt Shoulder Internal Rotation  -SG              Right Upper Ext    Rt Shoulder Abduction AROM 160  -SG      Rt Shoulder Flexion AROM 165  -SG      Rt Shoulder External Rotation AROM HBH lacking approx 20% from neutral  -SG      Rt Shoulder Internal Rotation AROM WFL w/ tightness  -SG              Left Upper Ext    Lt Shoulder Abduction AROM 160  -SG      Lt Shoulder Flexion AROM 165  -SG      Lt Shoulder External Rotation AROM HBH lacking approx 20% from neutral  -SG      Lt Shoulder Internal Rotation AROM WFL w/ tightness  -SG              MMT (Manual Muscle Testing)    General MMT Comments Generalized weakness post op mastectomy, through chemotherapy. Pt goes to the [facility] for swimming and strengthening.  -SG              Lymphedema Edema Assessment    Edema Assessment Comment Pt does not demo any edema at this time  -SG              Skin Changes/Observations    Location/Assessment Upper Extremity;Upper Quadrant  -SG      Upper Extremity Conditions bilateral:;normal  -SG      Upper Extremity Color/Pigment bilateral:;normal  -SG      Skin Observations Comment Tight soft tissue restrictions/scar tissue bilateral chest/expanders  -SG              Lymphedema Sensation    Lymphedema Sensation Reports --  -SG      Lymphedema Sensation Comments Numbness and/or hyposensitivity w/ expanders  -SG              L-Dex Bioimpedence Screening    L-Dex Measurement Extremity RUE  -SG      L-Dex Patient Position Standing  -SG      L-Dex UE Dominate Side Right  -SG      L-Dex UE At Risk Side Right  -SG      L-Dex UE Pre Surgical Value No  -SG      L-Dex UE Score -10.7  -SG      L-Dex UE Baseline Score -2.4  -SG      L-Dex UE Value Change -8.3  -SG      L-Dex UE Comment The patient had a follow up SOZO measurement which I reviewed today. The  "score is -10.7 which is in normal range, see scanned to EMR. Bioimpedance spectroscopy helps identify the onset of lymphedema in an arm or leg before patients experience noticeable swelling. Research has shown that the early detection of lymphedema using L-Dex combined with treatment can reduce progression to chronic lymphedema by 95% in breast cancer patients. Whenever possible, patients are tested for baseline L-Dex score before cancer treatment begins and then are reassessed during regular follow-up visits using the SOZO device. Otherwise, this can be started postoperatively and continued during regular follow-up visits. If the patient’s L-Dex score increases above normal levels, that is a sign that lymphedema is developing and a referral is made to occupational/physical therapy for further evaluation and early compression treatment. Lymphedema assessment with the SOZO L-Dex score is recommended to be done every 3 months for the first 3 years and then every 6 months for years 4 and 5 followed by annually afterwards.  -SG      Skeletal Muscle Mass (%) 29.7 %  -SG      Fat Mass (%) 30.5 %  -SG      Hy-dex -12.4  -SG      Height 165.1 cm (65\")  -SG      Weight 54.9 kg (121 lb)  -SG      BMI (Calculated) 20.1  -SG            User Key  (r) = Recorded By, (t) = Taken By, (c) = Cosigned By    Initials Name Provider Type    Sherly Rosales OTR/L Occupational Therapist                  Therapy Education  Education Details: We discussed pt's symptoms including L shoulder pain, indicating impingement. Initiated HEP w/ resistance band: rows, reverse fly, lat pull downs, shoulder flexion, shoulder abduction (only completing all in pain free range)  Given: HEP, Symptoms/condition management, Posture/body mechanics  Program: New  How Provided: Verbal, Demonstration  Provided to: Patient  Level of Understanding: Verbalized     OT Goals     Row Name 04/07/22 0800          OT Short Term Goals    STG Date to Achieve 05/07/22  -SG "     STG 1 • Pt will be independent with HEP for shoulder ROM and soft tissue flexibility.  -     STG 2 • Bilateral chest soft tissue restrictions will demo at least 50% improvement.  -     STG 3 • Pt will be independent w/ gentle self-soft tissue massage  -            Long Term Goals    LTG 1 • Pt will restore shoulder AROM to PLOF to improve 1 and 2 handed functional activity ability  -     LTG 2 • Pt will restore functional scoring using Quick DASH assessment tool to pre-operative amounts (per pt report) to ensure full return to baseline function.  -SG     LTG 3 • Pt will restore full upright posture per pt perception to promote greater functional movement.  -     LTG 4 • Pt will demonstrate awareness of individualized lymphedema precautions based on personal risk factors and when to seek medical attn. for assessment of early signs of lymphedema or cellulitis of the affected region  -            Time Calculation    OT Goal Re-Cert Due Date 07/06/22  -           User Key  (r) = Recorded By, (t) = Taken By, (c) = Cosigned By    Initials Name Provider Type    Sherly Rosales OTR/L Occupational Therapist                 OT Assessment/Plan     Row Name 04/07/22 0800          OT Assessment    Functional Limitations Limitations in functional capacity and performance;Performance in self-care ADL  -SG     Impairments Range of motion;Posture;Pain;Muscle strength;Joint mobility;Impaired flexibility  -     Assessment Comments Pt presents w the following: limited ROM bilateral shoulders for overhead tasks; pain and stiffness in chest and UEs which limits ROM for ADLs/IADLs; L shoulder pain/impingement; strength: decreased core stabilization and decreased UE/ strength; posture: forward neck and shoulder posture, disrupted scapulo-humeral rhythm; increased tissue tightness over chest, stress across pectoralis major muscle, and associated ST through shoulder and trunk; w/ tight soft tissue restrictions and  scar tissue; at risk for lymphedema in RUE.  She will benefit from continuing with OP OT to address symptoms and promote full return to PLOF.  -SG     Please refer to paper survey for additional self-reported information Yes  -SG     OT Diagnosis BrCA  -SG     OT Rehab Potential Excellent  -SG     Patient/caregiver participated in establishment of treatment plan and goals Yes  -SG     Patient would benefit from skilled therapy intervention Yes  -SG            OT Plan    OT Frequency 1x/week;2x/week  -SG     Predicted Duration of Therapy Intervention (OT) 20 visits  -SG     Planned CPT's? OT EVAL MOD COMPLEXITY: 87825;OT THER ACT EA 15 MIN: [id number];OT THER PROC EA 15 MIN: [id number];OT SELF CARE/MGMT/TRAIN 15 MIN: 55206;OT MANUAL THERAPY EA 15 MIN: 10824;OT BIS XTRACELL FLUID ANALYSIS: 33354  -SG     Planned Therapy Interventions (Optional Details) home exercise program;joint mobilization;manual therapy techniques;patient/family education;postural re-education;ROM (Range of Motion);strengthening;stretching  -SG     OT Plan Comments Initiate PORi protcol w/ ASTYM/STM as needed; progress ROM and HEP as tolerated.  -SG           User Key  (r) = Recorded By, (t) = Taken By, (c) = Cosigned By    Initials Name Provider Type    Sherly Rosales OTR/L Occupational Therapist                       Outcome Measure Options: Quick DASH  Quick DASH  Open a tight or new jar.: Mild Difficulty  Do heavy household chores (e.g., wash walls, wash floors): Mild Difficulty  Carry a shopping bag or briefcase: Mild Difficulty  Wash your back: Mild Difficulty  Use a knife to cut food: No Difficulty  Recreational activities in which you take some force or impact through your arm, should or hand (e.g. golf, hammering, tennis, etc.): Moderate Difficulty  During the past week, to what extent has your arm, shoulder, or hand problem interfered with your normal social activites with family, friends, neighbors or groups?: Slightly  During the past  week, were you limited in your work or other regular daily activities as a result of your arm, shoulder or hand problem?: Slightly Limited  Arm, Shoulder, or hand pain: Mild  Tingling (pins and needles) in your arm, shoulder, or hand: None  During the past week, how much difficulty have you had sleeping because of the pain in your arm, shoulder or hand?: No difficulty  Number of Questions Answered: 11  Quick DASH Score: 20.45         Time Calculation:    OT Start Time: 0800     Therapy Charges for Today     Code Description Service Date Service Provider Modifiers Qty    83110295654 HC OT BIS XTRACELL FLUID ANALYSIS 4/7/2022 Sherly Lomas OTR/L GO 1    [id number]  OT EVAL MOD COMPLEXITY 4 4/7/2022 Sherly Lomas OTR/L GO 1                  ALMA Singh/RAMÓN  4/7/2022

## 2022-04-07 NOTE — THERAPY DISCHARGE NOTE
Outpatient Occupational Therapy Discharge Summary         Patient Name: Celsa Velez  : 1975  MRN: [id number]    Today's Date: 2022      Visit Date: 2022           OP OT Discharge Summary  Date of Discharge: 22  Discharge Instructions: Pt d/c from current episode, will begin new episode with new order 22.            Sherly Lomas, OTR/L   2022

## 2022-04-12 ENCOUNTER — HOSPITAL ENCOUNTER (OUTPATIENT)
Dept: OCCUPATIONAL THERAPY | Facility: HOSPITAL | Age: 47
Setting detail: THERAPIES SERIES
Discharge: HOME | End: 2022-04-12

## 2022-04-12 DIAGNOSIS — C50.911 MALIGNANT NEOPLASM OF RIGHT FEMALE BREAST, UNSPECIFIED ESTROGEN RECEPTOR STATUS, UNSPECIFIED SITE OF BREAST: Primary | ICD-10-CM

## 2022-04-12 LAB
CYTO UR: NORMAL
LAB AP CASE REPORT: NORMAL
LAB AP CLINICAL INFORMATION: NORMAL
LAB AP OUTSIDE REPORT, ADDENDUM: NORMAL
LAB AP SPECIAL STAINS: NORMAL
PATH REPORT.FINAL DX SPEC: NORMAL
PATH REPORT.GROSS SPEC: NORMAL

## 2022-04-12 PROCEDURE — 97140 MANUAL THERAPY 1/> REGIONS: CPT

## 2022-04-12 NOTE — THERAPY TREATMENT NOTE
Outpatient Occupational Therapy Ortho Treatment Note   Tristen     Patient Name: Celsa Velez  : 1975  MRN: [id number]  Today's Date: 2022        Visit Date: 2022    Patient Active Problem List   Diagnosis   • IBS (irritable bowel syndrome)   • Vitamin D deficiency   • Hematuria   • Family history of breast cancer in mother   • Malignant neoplasm of right breast in female, estrogen receptor positive (HCC)   • Malignant neoplasm of upper-inner quadrant of right female breast (HCC)        Past Medical History:   Diagnosis Date   • Acute sinusitis    • Allergic    • Arthritis    • JRA (juvenile rheumatoid arthritis) (HCC)    • Malignant neoplasm of right breast in female, estrogen receptor positive (HCC) 2021    Wild Breast Mastectomies on 22   • Seizure disorder (HCC)     Possibly puberty/hormonal related; has not had a seizure since her teens   • UTI (urinary tract infection)     Last Impression: 2014  Send urine for culture. Treat with cipro until results are  available.  Almaz Cavazos (Internal Medicine)        Past Surgical History:   Procedure Laterality Date   • BREAST BIOPSY Right     us bx   • BREAST RECONSTRUCTION, BREAST TISSUE EXPANDER INSERTION Bilateral 2022    Procedure: IMMEDIATE BILATERAL BREAST RECONSTRUCTION WITH TISSUE EXPANDER;  Surgeon: Tim Brown MD;  Location: [facility] OR;  Service: Plastics;  Laterality: Bilateral;   • COLONOSCOPY     • DILATION AND CURETTAGE, DIAGNOSTIC / THERAPEUTIC      Dilation And Curettage Of Cervical Stump   • KNEE SURGERY     • MASTECTOMY     • MASTECTOMY W/ SENTINEL NODE BIOPSY N/A 2022    Procedure: BREAST MASTECTOMY WITH RIGHT SENTINEL NODE BIOPSY, LEFT PROPHYLATIC SKIN SPARRING MASTECTOMY;  Surgeon: Claudia Barahona MD;  Location: [facility] OR;  Service: General;  Laterality: N/A;   • US GUIDED LYMPH NODE BIOPSY  2021         Visit Dx:    ICD-10-CM ICD-9-CM   1. Malignant neoplasm of right  female breast, unspecified estrogen receptor status, unspecified site of breast ([facility])  C50.911 174.9              Lymphedema     Row Name 04/12/22 0900             Subjective Pain    Able to rate subjective pain? yes  -SG      Pre-Treatment Pain Level 2  -SG      Post-Treatment Pain Level 1  -SG              Subjective Comments    Subjective Comments Pt reports that she had her chemo Friday, so rested over the weekend. She was able to do some of her exercises yesterday and reported that her LUE/shoulder feels better  -SG              Lymphedema Assessment    Lymphedema Classification RUE:;at risk/stage 0  -SG      Lymphedema Cancer Related Sx right;axillary dissection;bilateral;simple mastectomy;reconstructive  -SG      Lymphedema Surgery Comments 2/8/2022  -SG      Lymph Nodes Removed # 5  -SG      Positive Lymph Nodes # 5  -SG      Chemo Received yes  -SG      Chemo Treatments #/Timeframe Currently undergoing  -SG      Radiation Therapy Received --  Will complete following chemotherapy  -SG      Infections or Cellulitis? no  -SG              Posture/Observations    Posture- WNL Posture is WNL  -SG      Posture/Observations Comments Forward protective posture post mastectomy/reconstruction and port placement  -SG              MMT (Manual Muscle Testing)    General MMT Comments Generalized weakness post op mastectomy, through chemotherapy. Pt goes to the [facility] for swimming and strengthening.  -SG              Lymphedema Edema Assessment    Edema Assessment Comment Pt does not demo any edema at this time  -SG              Skin Changes/Observations    Location/Assessment Upper Extremity;Upper Quadrant  -SG      Upper Extremity Conditions bilateral:;normal  -SG      Upper Extremity Color/Pigment bilateral:;normal  -SG      Skin Observations Comment Tight soft tissue restrictions/scar tissue bilateral chest/expanders  -SG              Lymphedema Sensation    Lymphedema Sensation Comments Numbness and/or hyposensitivity  w/ expanders  -SG              Manual Lymphatic Drainage    Manual Lymphatic Drainage initial sequence;opened regional lymph nodes;astym  -SG      Initial Sequence supraclavicular  -SG      Supraclavicular right;left  -SG      Opened Regional Lymph Nodes axillary  -SG      Axillary right;left  -SG      Astym UE sequence  -SG      UE Sequence right;left  -SG      Manual Therapy See manual rx for PORi protocol  -SG              L-Dex Bioimpedence Screening    L-Dex Measurement Extremity RUE  -SG      L-Dex Patient Position Standing  -SG      L-Dex UE Dominate Side Right  -SG      L-Dex UE At Risk Side Right  -SG      L-Dex UE Pre Surgical Value No  -SG      L-Dex UE Baseline Score -2.4  -SG      Skeletal Muscle Mass (%) 29.7 %  -SG      Fat Mass (%) 30.5 %  -SG      Hy-dex -12.4  -SG            User Key  (r) = Recorded By, (t) = Taken By, (c) = Cosigned By    Initials Name Provider Type    Sherly Rosales OTR/L Occupational Therapist                  Therapy Education  Education Details: Continue HEP  Given: HEP, Symptoms/condition management, Posture/body mechanics  Program: Reinforced  How Provided: Verbal, Demonstration  Provided to: Patient  Level of Understanding: Verbalized     OT Assessment/Plan     Row Name 04/12/22 0900          OT Assessment    Functional Limitations Limitations in functional capacity and performance;Performance in self-care ADL  -SG     Impairments Range of motion;Posture;Pain;Muscle strength;Joint mobility;Impaired flexibility  -SG     Assessment Comments Pt presents w the following: limited ROM bilateral shoulders for overhead tasks; pain and stiffness in chest and UEs which limits ROM for ADLs/IADLs; L shoulder pain; strength: decreased core stabilization and decreased UE/ strength; posture: forward neck and shoulder posture, disrupted scapulo-humeral rhythm; increased tissue tightness over chest, stress across pectoralis major muscle, and associated ST through shoulder and trunk;  w/ tight soft tissue restrictions and scar tissue; at risk for lymphedema in RUE.  She will benefit from continuing with OP OT to address symptoms and promote full return to PLOF.  -SG     OT Diagnosis BrCA  -SG     OT Rehab Potential Excellent  -SG     Patient/caregiver participated in establishment of treatment plan and goals Yes  -SG     Patient would benefit from skilled therapy intervention Yes  -SG            OT Plan    OT Frequency 1x/week;2x/week  -SG     Planned CPT's? OT EVAL MOD COMPLEXITY: 72928;OT THER ACT EA 15 MIN: [id number];OT THER PROC EA 15 MIN: 30023JL;OT SELF CARE/MGMT/TRAIN 15 MIN: 98452;OT MANUAL THERAPY EA 15 MIN: 21202;OT BIS XTRACELL FLUID ANALYSIS: 35288  -     Planned Therapy Interventions (Optional Details) home exercise program;joint mobilization;manual therapy techniques;patient/family education;postural re-education;ROM (Range of Motion);strengthening;stretching  -SG     OT Plan Comments Continue PORi protcol w/ ASTYM/STM as needed; progress ROM and HEP as tolerated.  -           User Key  (r) = Recorded By, (t) = Taken By, (c) = Cosigned By    Initials Name Provider Type    Sherly Rosales OTR/L Occupational Therapist                         Manual Rx (last 36 hours)     Manual Treatments     Row Name 04/12/22 0900             Total Minutes    91692 - OT Manual Therapy Minutes 53  -              Manual Rx 1    Manual Rx 1 Location Arm--Orthopedic: Forearm extensors, biceps muscles, lateral intermuscular septum tension line upper arm, pectoralis major  -      Manual Rx 1 Type Trigger Point Release, Myofascial Bending  -      Manual Rx 1 Duration Trigger Point Release for count of 3 seconds, repeat each section 5 times; Myofascial stretching w/ tissue/muscle bending 3-5 times  -              Manual Rx 2    Manual Rx 2 Location Axilla--Orthopedic: Medial Intermuscular Septum tension line upper arm, pectoralis minor, subscapularis, latissimus dorsi, teres major  -       Manual Rx 2 Type Trigger Point Release; Myofascial bending  -      Manual Rx 2 Duration Trigger Point Release for count of 3 seconds, repeat each section 5 times; Myofascial stretching w/ tissue/muscle bending 3-5 times  -              Manual Rx 3    Manual Rx 3 Location Upper Arm: Lymph System  -      Manual Rx 3 Type Upper arm fluid clearance of axillary pathway; medial confluence fluid clearance--antecubital fossa; lateral upper arm-cephalic pathway  -      Manual Rx 3 Duration x1 line of treatment  -              Manual Rx 4    Manual Rx 4 Location Forearm--Orthopedic: radius and ulna-interosseous membrane; carpals-joint capsules-metacarpals-interosseous membranes; thenar muscles  -      Manual Rx 4 Type Joint mobilization, Trigger Point Release, Drainage of forearm  -      Manual Rx 4 Duration Joint Mob: each section x5 in each position; drainage of forearm: perform x1 lines of treatment  -              Manual Rx 5    Manual Rx 5 Location Back--orthopedic: upper trapezius, infraspinatus, teres minor, rhomboids, quadratus lumborum; T12, L1, L2 mobilization  -      Manual Rx 5 Type Trigger Point Release, Joint mobilization  -      Manual Rx 5 Duration Trigger Point Release for count of 3 seconds, repeat each section 5 times; perform 5-10 oscillations of each vertebra successively  -              Manual Rx 6    Manual Rx 6 Location Back--lymph system  -      Manual Rx 6 Type Fluid movement; paraspinal lymph node pumps  -      Manual Rx 6 Duration Repeat each line x1 and corresponding paraspinal node pumps x5; repeat each zone x1  -            User Key  (r) = Recorded By, (t) = Taken By, (c) = Cosigned By    Initials Name Provider Type    Sherly Rosales OTR/L Occupational Therapist                            Time Calculation:   OT Start Time: 0900  Timed Charges  53383 - OT Manual Therapy Minutes: 53  Total Minutes  Timed Charges Total Minutes: 53   Total Minutes: 53     Therapy  Charges for Today     Code Description Service Date Service Provider Modifiers Qty    84682487372 HC OT MANUAL THERAPY EA 15 MIN 4/12/2022 Sherly Lomas, OTR/L  4                    Sherly Lomas OTR/L  4/12/2022

## 2022-04-21 ENCOUNTER — HOSPITAL ENCOUNTER (OUTPATIENT)
Dept: OCCUPATIONAL THERAPY | Facility: HOSPITAL | Age: 47
Setting detail: THERAPIES SERIES
Discharge: HOME | End: 2022-04-21

## 2022-04-21 DIAGNOSIS — C50.911 MALIGNANT NEOPLASM OF RIGHT FEMALE BREAST, UNSPECIFIED ESTROGEN RECEPTOR STATUS, UNSPECIFIED SITE OF BREAST: Primary | ICD-10-CM

## 2022-04-21 PROCEDURE — 97140 MANUAL THERAPY 1/> REGIONS: CPT

## 2022-04-21 NOTE — THERAPY TREATMENT NOTE
Outpatient Occupational Therapy Ortho Treatment Note   Tristen     Patient Name: Celsa Velez  : 1975  MRN: [id number]  Today's Date: 2022        Visit Date: 2022    Patient Active Problem List   Diagnosis   • IBS (irritable bowel syndrome)   • Vitamin D deficiency   • Hematuria   • Family history of breast cancer in mother   • Malignant neoplasm of right breast in female, estrogen receptor positive (HCC)   • Malignant neoplasm of upper-inner quadrant of right female breast (HCC)        Past Medical History:   Diagnosis Date   • Acute sinusitis    • Allergic    • Arthritis    • JRA (juvenile rheumatoid arthritis) (HCC)    • Malignant neoplasm of right breast in female, estrogen receptor positive (HCC) 2021    Wild Breast Mastectomies on 22   • Seizure disorder (HCC)     Possibly puberty/hormonal related; has not had a seizure since her teens   • UTI (urinary tract infection)     Last Impression: 2014  Send urine for culture. Treat with cipro until results are  available.  Almaz Cavazos (Internal Medicine)        Past Surgical History:   Procedure Laterality Date   • BREAST BIOPSY Right     us bx   • BREAST RECONSTRUCTION, BREAST TISSUE EXPANDER INSERTION Bilateral 2022    Procedure: IMMEDIATE BILATERAL BREAST RECONSTRUCTION WITH TISSUE EXPANDER;  Surgeon: Tim Brown MD;  Location: [facility] OR;  Service: Plastics;  Laterality: Bilateral;   • COLONOSCOPY     • DILATION AND CURETTAGE, DIAGNOSTIC / THERAPEUTIC      Dilation And Curettage Of Cervical Stump   • KNEE SURGERY     • MASTECTOMY     • MASTECTOMY W/ SENTINEL NODE BIOPSY N/A 2022    Procedure: BREAST MASTECTOMY WITH RIGHT SENTINEL NODE BIOPSY, LEFT PROPHYLATIC SKIN SPARRING MASTECTOMY;  Surgeon: Claudia Barahona MD;  Location: [facility] OR;  Service: General;  Laterality: N/A;   • US GUIDED LYMPH NODE BIOPSY  2021         Visit Dx:    ICD-10-CM ICD-9-CM   1. Malignant neoplasm of right  female breast, unspecified estrogen receptor status, unspecified site of breast ([facility])  C50.911 174.9              Lymphedema     Row Name 04/21/22 0900             Subjective Pain    Able to rate subjective pain? yes  -SG      Pre-Treatment Pain Level 2  -SG      Post-Treatment Pain Level 1  -SG              Subjective Comments    Subjective Comments Pt reports that she went swimming the other day and felt good while doing it, but the following day was sore around her port from the free style swimming. Pt reports that she believes the port to be the source of her pain rather than shoulder impingement  -SG              Lymphedema Assessment    Lymphedema Classification RUE:;at risk/stage 0  -SG      Lymphedema Cancer Related Sx right;axillary dissection;bilateral;simple mastectomy;reconstructive  -SG      Lymphedema Surgery Comments 2/8/2022  -SG      Lymph Nodes Removed # 5  -SG      Positive Lymph Nodes # 5  -SG      Chemo Received yes  -SG      Chemo Treatments #/Timeframe Currently undergoing  -SG      Radiation Therapy Received --  Will complete following chemotherapy  -SG      Infections or Cellulitis? no  -SG              Posture/Observations    Posture- WNL Posture is WNL  -SG      Posture/Observations Comments Forward protective posture post mastectomy/reconstruction and port placement  -SG              MMT (Manual Muscle Testing)    General MMT Comments Generalized weakness post op mastectomy, through chemotherapy. [facility] and strengthening.  -SG              Lymphedema Edema Assessment    Edema Assessment Comment Pt does not demo any edema at this time  -SG              Skin Changes/Observations    Location/Assessment Upper Extremity;Upper Quadrant  -SG      Upper Extremity Conditions bilateral:;normal  -SG      Upper Extremity Color/Pigment bilateral:;normal  -SG      Skin Observations Comment Tight soft tissue restrictions/scar tissue bilateral chest/expanders  -SG               Lymphedema Sensation    Lymphedema Sensation Comments Tight soft tissue restrictions/scar tissue bilateral chest/expanders  -SG              Manual Lymphatic Drainage    Manual Lymphatic Drainage initial sequence;opened regional lymph nodes;astym  -SG      Initial Sequence supraclavicular  -SG      Supraclavicular right;left  -SG      Opened Regional Lymph Nodes axillary  -SG      Axillary right;left  -SG      Manual Therapy See manual rx for PORi protocol; additional astym/stm lightly around port to address tight soft tissue/scar tissue  -SG              L-Dex Bioimpedence Screening    L-Dex Measurement Extremity RUE  -SG      L-Dex Patient Position Standing  -SG      L-Dex UE Dominate Side Right  -SG      L-Dex UE At Risk Side Right  -SG      L-Dex UE Pre Surgical Value No  -SG      L-Dex UE Baseline Score -2.4  -SG      Skeletal Muscle Mass (%) 29.7 %  -SG      Fat Mass (%) 30.5 %  -SG      Hy-dex -12.4  -SG            User Key  (r) = Recorded By, (t) = Taken By, (c) = Cosigned By    Initials Name Provider Type    Sherly Rosales OTR/L Occupational Therapist                  Therapy Education  Education Details: Continue HEP  Given: HEP, Symptoms/condition management, Posture/body mechanics  Program: Reinforced  How Provided: Verbal, Demonstration  Provided to: Patient  Level of Understanding: Verbalized  82851 - OT Self Care/Mgmt Minutes: 3     OT Assessment/Plan     Row Name 04/21/22 0900          OT Assessment    Functional Limitations Limitations in functional capacity and performance;Performance in self-care ADL  -SG     Impairments Range of motion;Posture;Pain;Muscle strength;Joint mobility;Impaired flexibility  -SG     Assessment Comments Pt presents w the following: limited ROM bilateral shoulders for overhead tasks; pain and stiffness in chest and UEs which limits ROM for ADLs/IADLs; L shoulder pain; strength: decreased core stabilization and decreased UE/ strength; posture: forward neck and  shoulder posture, disrupted scapulo-humeral rhythm; increased tissue tightness over chest, stress across pectoralis major muscle, and associated ST through shoulder and trunk; w/ tight soft tissue restrictions and scar tissue; at risk for lymphedema in RUE.  She will benefit from continuing with OP OT to address symptoms and promote full return to PLOF.  -SG     OT Diagnosis BrCA  -SG     OT Rehab Potential Excellent  -SG     Patient/caregiver participated in establishment of treatment plan and goals Yes  -SG     Patient would benefit from skilled therapy intervention Yes  -SG            OT Plan    OT Frequency 1x/week  -SG     Planned CPT's? OT EVAL MOD COMPLEXITY: 56965;OT THER ACT EA 15 MIN: 91350YG;OT THER PROC EA 15 MIN: 85008NS;OT SELF CARE/MGMT/TRAIN 15 MIN: 19744;OT MANUAL THERAPY EA 15 MIN: 87173;OT BIS XTRACELL FLUID ANALYSIS: 62712  -     Planned Therapy Interventions (Optional Details) home exercise program;joint mobilization;manual therapy techniques;patient/family education;postural re-education;ROM (Range of Motion);strengthening;stretching  -SG     OT Plan Comments Continue PORi protcol w/ ASTYM/STM as needed; progress ROM and HEP as tolerated.  -           User Key  (r) = Recorded By, (t) = Taken By, (c) = Cosigned By    Initials Name Provider Type    Sherly Rosales OTR/L Occupational Therapist                         Manual Rx (last 36 hours)     Manual Treatments     Row Name 04/21/22 0900             Total Minutes    40554 - OT Manual Therapy Minutes 50  -              Manual Rx 1    Manual Rx 1 Location Arm--Orthopedic: Forearm extensors, biceps muscles, lateral intermuscular septum tension line upper arm, pectoralis major  -      Manual Rx 1 Type Trigger Point Release, Myofascial Bending  -      Manual Rx 1 Duration Trigger Point Release for count of 3 seconds, repeat each section 5 times; Myofascial stretching w/ tissue/muscle bending 3-5 times  -              Manual Rx 2     Manual Rx 2 Location Axilla--Orthopedic: Medial Intermuscular Septum tension line upper arm, pectoralis minor, subscapularis, latissimus dorsi, teres major  -      Manual Rx 2 Type Trigger Point Release; Myofascial bending  -      Manual Rx 2 Duration Trigger Point Release for count of 3 seconds, repeat each section 5 times; Myofascial stretching w/ tissue/muscle bending 3-5 times  -              Manual Rx 3    Manual Rx 3 Location Upper Arm: Lymph System  -      Manual Rx 3 Type Upper arm fluid clearance of axillary pathway; medial confluence fluid clearance--antecubital fossa; lateral upper arm-cephalic pathway  -      Manual Rx 3 Duration x1 line of treatment  -              Manual Rx 4    Manual Rx 4 Location Forearm--Orthopedic: radius and ulna-interosseous membrane; carpals-joint capsules-metacarpals-interosseous membranes; thenar muscles  -      Manual Rx 4 Type Joint mobilization, Trigger Point Release, Drainage of forearm  -      Manual Rx 4 Duration Joint Mob: each section x5 in each position; drainage of forearm: perform x1 lines of treatment  -              Manual Rx 5    Manual Rx 5 Location Back--orthopedic: upper trapezius, infraspinatus, teres minor, rhomboids, quadratus lumborum; T12, L1, L2 mobilization  -      Manual Rx 5 Type Trigger Point Release, Joint mobilization  -      Manual Rx 5 Duration Trigger Point Release for count of 3 seconds, repeat each section 5 times; perform 5-10 oscillations of each vertebra successively  -              Manual Rx 6    Manual Rx 6 Location Back--lymph system  -      Manual Rx 6 Type Fluid movement; paraspinal lymph node pumps  -      Manual Rx 6 Duration Repeat each line x1 and corresponding paraspinal node pumps x5; repeat each zone x1  -SG            User Key  (r) = Recorded By, (t) = Taken By, (c) = Cosigned By    Initials Name Provider Type    Sherly Rosales OTR/L Occupational Therapist                            Time  Calculation:   OT Start Time: 0900  Timed Charges  90587 - OT Manual Therapy Minutes: 50  51259 - OT Self Care/Mgmt Minutes: 3  Total Minutes  Timed Charges Total Minutes: 53   Total Minutes: 53     Therapy Charges for Today     Code Description Service Date Service Provider Modifiers Qty    [id number]  OT MANUAL THERAPY EA 15 MIN 4/21/2022 Sherly Lomas, MARINAR/L GO 4                    Sherly Lomas OTR/L  4/21/2022

## 2022-04-22 ENCOUNTER — NURSE NAVIGATOR (OUTPATIENT)
Dept: OTHER | Facility: HOSPITAL | Age: 47
End: 2022-04-22

## 2022-04-22 NOTE — PROGRESS NOTES
Patient asked when she would be able to get her port out after her chemo is complete the first week of June - I explained that she may have it removed after her treatments are completed - just pending OR and surgeon schedule - she verbalized understanding

## 2022-05-09 ENCOUNTER — HOSPITAL ENCOUNTER (OUTPATIENT)
Dept: OCCUPATIONAL THERAPY | Facility: HOSPITAL | Age: 47
Setting detail: THERAPIES SERIES
Discharge: HOME | End: 2022-05-09

## 2022-05-09 DIAGNOSIS — C50.911 MALIGNANT NEOPLASM OF RIGHT FEMALE BREAST, UNSPECIFIED ESTROGEN RECEPTOR STATUS, UNSPECIFIED SITE OF BREAST: Primary | ICD-10-CM

## 2022-05-09 PROCEDURE — 97140 MANUAL THERAPY 1/> REGIONS: CPT

## 2022-05-09 PROCEDURE — 97535 SELF CARE MNGMENT TRAINING: CPT

## 2022-05-09 NOTE — THERAPY PROGRESS REPORT/RE-CERT
Outpatient Occupational Therapy Ortho Progress Note   Tristen     Patient Name: Celsa Velez  : 1975  MRN: [id number]  Today's Date: 2022        Visit Date: 2022    Patient Active Problem List   Diagnosis   • IBS (irritable bowel syndrome)   • Vitamin D deficiency   • Hematuria   • Family history of breast cancer in mother   • Malignant neoplasm of right breast in female, estrogen receptor positive (HCC)   • Malignant neoplasm of upper-inner quadrant of right female breast (HCC)        Past Medical History:   Diagnosis Date   • Acute sinusitis    • Allergic    • Arthritis    • JRA (juvenile rheumatoid arthritis) (HCC)    • Malignant neoplasm of right breast in female, estrogen receptor positive (HCC) 2021    Wild Breast Mastectomies on 22   • Seizure disorder (HCC)     Possibly puberty/hormonal related; has not had a seizure since her teens   • UTI (urinary tract infection)     Last Impression: 2014  Send urine for culture. Treat with cipro until results are  available.  Almaz Cavazos (Internal Medicine)        Past Surgical History:   Procedure Laterality Date   • BREAST BIOPSY Right     us bx   • BREAST RECONSTRUCTION, BREAST TISSUE EXPANDER INSERTION Bilateral 2022    Procedure: IMMEDIATE BILATERAL BREAST RECONSTRUCTION WITH TISSUE EXPANDER;  Surgeon: Tim Brown MD;  Location: [facility] OR;  Service: Plastics;  Laterality: Bilateral;   • COLONOSCOPY     • DILATION AND CURETTAGE, DIAGNOSTIC / THERAPEUTIC      Dilation And Curettage Of Cervical Stump   • KNEE SURGERY     • MASTECTOMY     • MASTECTOMY W/ SENTINEL NODE BIOPSY N/A 2022    Procedure: BREAST MASTECTOMY WITH RIGHT SENTINEL NODE BIOPSY, LEFT PROPHYLATIC SKIN SPARRING MASTECTOMY;  Surgeon: Claudia Barahona MD;  Location: [facility] OR;  Service: General;  Laterality: N/A;   • US GUIDED LYMPH NODE BIOPSY  2021         Visit Dx:    ICD-10-CM ICD-9-CM   1. Malignant neoplasm of right female  breast, unspecified estrogen receptor status, unspecified site of breast ([facility])  C50.911 174.9              Lymphedema     Row Name 05/09/22 0900             Subjective Pain    Able to rate subjective pain? yes  -SG      Pre-Treatment Pain Level 3  -SG      Post-Treatment Pain Level 2  -SG              Subjective Comments    Subjective Comments Pt reports that she has had some soreness around her port and has felt fatigued. She starts Taxol Friday  -SG              Lymphedema Assessment    Lymphedema Classification RUE:;at risk/stage 0  -SG      Lymphedema Cancer Related Sx right;axillary dissection;bilateral;simple mastectomy;reconstructive  -SG      Lymphedema Surgery Comments 2/8/2022  -SG      Lymph Nodes Removed # 5  -SG      Positive Lymph Nodes # 5  -SG      Chemo Received yes  -SG      Chemo Treatments #/Timeframe Currently undergoing  -SG      Radiation Therapy Received --  Will complete following chemotherapy  -SG      Infections or Cellulitis? no  -SG              Posture/Observations    Posture- WNL Posture is WNL  -SG      Posture/Observations Comments Forward protective posture post mastectomy/reconstruction and port placement  -SG              MMT (Manual Muscle Testing)    General MMT Comments Generalized weakness post op mastectomy, through chemotherapy. Pt goes to the [facility] for swimming and strengthening.  -SG              Lymphedema Edema Assessment    Edema Assessment Comment Pt does not demo any edema at this time  -SG              Skin Changes/Observations    Location/Assessment Upper Extremity;Upper Quadrant  -SG      Upper Extremity Conditions bilateral:;normal  -SG      Upper Extremity Color/Pigment bilateral:;normal  -SG      Skin Observations Comment Tight soft tissue restrictions/scar tissue bilateral chest/expanders  -SG              Lymphedema Sensation    Lymphedema Sensation Comments Numbness and/or hyposensitivity w/ expanders  -SG              Manual Lymphatic Drainage    Manual  Lymphatic Drainage initial sequence;opened regional lymph nodes;astym  -SG      Initial Sequence supraclavicular  -SG      Supraclavicular right;left  -SG      Opened Regional Lymph Nodes axillary  -SG      Axillary right;left  -SG      Manual Therapy See manual rx for PORi protocol; additional astym/stm lightly around port to address tight soft tissue/scar tissue  -SG              L-Dex Bioimpedence Screening    L-Dex Measurement Extremity RUE  -SG      L-Dex Patient Position Standing  -SG      L-Dex UE Dominate Side Right  -SG      L-Dex UE At Risk Side Right  -SG      L-Dex UE Pre Surgical Value No  -SG      L-Dex UE Baseline Score -2.4  -SG      Skeletal Muscle Mass (%) 29.7 %  -SG      Fat Mass (%) 30.5 %  -SG      Hy-dex -12.4  -SG            User Key  (r) = Recorded By, (t) = Taken By, (c) = Cosigned By    Initials Name Provider Type    Sherly Rosales, OTR/L Occupational Therapist                  Therapy Education  Education Details: Chest stretch w/ foam roll and/or yoga blocks  Given: HEP, Symptoms/condition management, Posture/body mechanics  Program: Reinforced, New  How Provided: Verbal, Demonstration  Provided to: Patient  Level of Understanding: Verbalized  84822 - OT Self Care/Mgmt Minutes: 8     OT Assessment/Plan     Row Name 05/09/22 0900          OT Assessment    Functional Limitations Limitations in functional capacity and performance;Performance in self-care ADL  -SG     Impairments Range of motion;Posture;Pain;Muscle strength;Joint mobility;Impaired flexibility  -SG     Assessment Comments Pt presents w the following: limited ROM bilateral shoulders for overhead tasks; pain and stiffness in chest and UEs which limits ROM for ADLs/IADLs; L shoulder pain; strength: decreased core stabilization and decreased UE/ strength; posture: forward neck and shoulder posture, disrupted scapulo-humeral rhythm; increased tissue tightness over chest, stress across pectoralis major muscle, and associated  ST through shoulder and trunk; w/ tight soft tissue restrictions and scar tissue; at risk for lymphedema in RUE.  She will benefit from continuing with OP OT to address symptoms and promote full return to PLOF.  -SG     OT Diagnosis BrCA  -SG     OT Rehab Potential Excellent  -SG     Patient/caregiver participated in establishment of treatment plan and goals Yes  -SG     Patient would benefit from skilled therapy intervention Yes  -SG            OT Plan    OT Frequency 1x/week  -SG     Planned CPT's? OT EVAL MOD COMPLEXITY: 07881;OT THER ACT EA 15 MIN: [id number];OT THER PROC EA 15 MIN: [id number];OT SELF CARE/MGMT/TRAIN 15 MIN: 67718;OT MANUAL THERAPY EA 15 MIN: 24365;OT BIS XTRACELL FLUID ANALYSIS: 52017  -SG     Planned Therapy Interventions (Optional Details) home exercise program;joint mobilization;manual therapy techniques;patient/family education;postural re-education;ROM (Range of Motion);strengthening;stretching  -SG     OT Plan Comments Continue PORi protcol w/ ASTYM/STM as needed; progress ROM and HEP as tolerated.  -           User Key  (r) = Recorded By, (t) = Taken By, (c) = Cosigned By    Initials Name Provider Type    Sherly Rosales, OTR/L Occupational Therapist                  OT Goals     Row Name 05/09/22 0900          OT Short Term Goals    STG Date to Achieve 05/07/22  -     STG 1 • Pt will be independent with HEP for shoulder ROM and soft tissue flexibility.  -     STG 1 Progress Met  -SG     STG 2 • Bilateral chest soft tissue restrictions will demo at least 50% improvement.  -     STG 2 Progress Met  -SG     STG 3 • Pt will be independent w/ gentle self-soft tissue massage  -     STG 3 Progress Met  -            Long Term Goals    LTG 1 • Pt will restore shoulder AROM to PLOF to improve 1 and 2 handed functional activity ability  -     LTG 1 Progress Met  -SG     LTG 2 • Pt will restore functional scoring using Quick DASH assessment tool to pre-operative amounts (per pt report) to  ensure full return to baseline function.  -SG     LTG 2 Progress Progressing  -SG     LTG 3 • Pt will restore full upright posture per pt perception to promote greater functional movement.  -SG     LTG 3 Progress Progressing  -SG     LTG 4 • Pt will demonstrate awareness of individualized lymphedema precautions based on personal risk factors and when to seek medical attn. for assessment of early signs of lymphedema or cellulitis of the affected region  -SG     LTG 4 Progress Progressing  -SG            Time Calculation    OT Goal Re-Cert Due Date 07/06/22  -           User Key  (r) = Recorded By, (t) = Taken By, (c) = Cosigned By    Initials Name Provider Type    Sherly Rosales OTR/L Occupational Therapist                    Manual Rx (last 36 hours)     Manual Treatments     Row Name 05/09/22 0900             Total Minutes    19553 - OT Manual Therapy Minutes 45  -              Manual Rx 1    Manual Rx 1 Location Arm--Orthopedic: Forearm extensors, biceps muscles, lateral intermuscular septum tension line upper arm, pectoralis major  -      Manual Rx 1 Type Trigger Point Release, Myofascial Bending  -      Manual Rx 1 Duration Trigger Point Release for count of 3 seconds, repeat each section 5 times; Myofascial stretching w/ tissue/muscle bending 3-5 times  -              Manual Rx 2    Manual Rx 2 Location Axilla--Orthopedic: Medial Intermuscular Septum tension line upper arm, pectoralis minor, subscapularis, latissimus dorsi, teres major  -      Manual Rx 2 Type Trigger Point Release; Myofascial bending  -      Manual Rx 2 Duration Trigger Point Release for count of 3 seconds, repeat each section 5 times; Myofascial stretching w/ tissue/muscle bending 3-5 times  -              Manual Rx 3    Manual Rx 3 Location Upper Arm: Lymph System  -      Manual Rx 3 Type Upper arm fluid clearance of axillary pathway; medial confluence fluid clearance--antecubital fossa; lateral upper arm-cephalic  pathway  -      Manual Rx 3 Duration x1 line of treatment  -              Manual Rx 4    Manual Rx 4 Location Forearm--Orthopedic: radius and ulna-interosseous membrane; carpals-joint capsules-metacarpals-interosseous membranes; thenar muscles  -SG      Manual Rx 4 Type Joint mobilization, Trigger Point Release, Drainage of forearm  -SG      Manual Rx 4 Duration Joint Mob: each section x5 in each position; drainage of forearm: perform x1 lines of treatment  -              Manual Rx 5    Manual Rx 5 Location Back--orthopedic: upper trapezius, infraspinatus, teres minor, rhomboids, quadratus lumborum; T12, L1, L2 mobilization  -      Manual Rx 5 Type Trigger Point Release, Joint mobilization  -      Manual Rx 5 Duration Trigger Point Release for count of 3 seconds, repeat each section 5 times; perform 5-10 oscillations of each vertebra successively  -              Manual Rx 6    Manual Rx 6 Location Back--lymph system  -      Manual Rx 6 Type Fluid movement; paraspinal lymph node pumps  -      Manual Rx 6 Duration Repeat each line x1 and corresponding paraspinal node pumps x5; repeat each zone x1  -SG            User Key  (r) = Recorded By, (t) = Taken By, (c) = Cosigned By    Initials Name Provider Type    Sherly Rosales OTR/L Occupational Therapist                            Time Calculation:   OT Start Time: 0900  Timed Charges  43380 - OT Manual Therapy Minutes: 45  17795 - OT Self Care/Mgmt Minutes: 8  Total Minutes  Timed Charges Total Minutes: 53   Total Minutes: 53     Therapy Charges for Today     Code Description Service Date Service Provider Modifiers Qty    34031256782 HC OT MANUAL THERAPY EA 15 MIN 5/9/2022 Sherly Lomas OTR/L GO 3    61116548547 HC OT SELF CARE/MGMT/TRAIN EA 15 MIN 5/9/2022 Sherly Lomas OTR/L GO 1                    Sherly Lomas OTR/RAMÓN  5/9/2022

## 2022-05-20 ENCOUNTER — TELEPHONE (OUTPATIENT)
Dept: GENETICS | Facility: HOSPITAL | Age: 47
End: 2022-05-20

## 2022-05-20 NOTE — TELEPHONE ENCOUNTER
Returned patient's call. Initially seen for genetic counseling on 3/29/22. Patient would like to proceed with genetic testing. A saliva kit will be mailed to her home address and results are expected 2-3 weeks after the lab receives her sample.     Almaz Seaman MS, [facility] [place], C

## 2022-05-26 ENCOUNTER — HOSPITAL ENCOUNTER (OUTPATIENT)
Dept: OCCUPATIONAL THERAPY | Facility: HOSPITAL | Age: 47
Setting detail: THERAPIES SERIES
Discharge: HOME | End: 2022-05-26

## 2022-05-26 DIAGNOSIS — C50.911 MALIGNANT NEOPLASM OF RIGHT FEMALE BREAST, UNSPECIFIED ESTROGEN RECEPTOR STATUS, UNSPECIFIED SITE OF BREAST: Primary | ICD-10-CM

## 2022-05-26 PROCEDURE — 97140 MANUAL THERAPY 1/> REGIONS: CPT

## 2022-05-26 NOTE — THERAPY TREATMENT NOTE
Outpatient Occupational Therapy Ortho Treatment Note   Tristen     Patient Name: Celsa Velez  : 1975  MRN: [id number]  Today's Date: 2022        Visit Date: 2022    Patient Active Problem List   Diagnosis   • IBS (irritable bowel syndrome)   • Vitamin D deficiency   • Hematuria   • Family history of breast cancer in mother   • Malignant neoplasm of right breast in female, estrogen receptor positive (HCC)   • Malignant neoplasm of upper-inner quadrant of right female breast (HCC)        Past Medical History:   Diagnosis Date   • Acute sinusitis    • Allergic    • Arthritis    • JRA (juvenile rheumatoid arthritis) (HCC)    • Malignant neoplasm of right breast in female, estrogen receptor positive (HCC) 2021    Wild Breast Mastectomies on 22   • Seizure disorder (HCC)     Possibly puberty/hormonal related; has not had a seizure since her teens   • UTI (urinary tract infection)     Last Impression: 2014  Send urine for culture. Treat with cipro until results are  available.  Almaz Cavazos (Internal Medicine)        Past Surgical History:   Procedure Laterality Date   • BREAST BIOPSY Right     us bx   • BREAST RECONSTRUCTION, BREAST TISSUE EXPANDER INSERTION Bilateral 2022    Procedure: IMMEDIATE BILATERAL BREAST RECONSTRUCTION WITH TISSUE EXPANDER;  Surgeon: Tim Brown MD;  Location: [facility] OR;  Service: Plastics;  Laterality: Bilateral;   • COLONOSCOPY     • DILATION AND CURETTAGE, DIAGNOSTIC / THERAPEUTIC      Dilation And Curettage Of Cervical Stump   • KNEE SURGERY     • MASTECTOMY     • MASTECTOMY W/ SENTINEL NODE BIOPSY N/A 2022    Procedure: BREAST MASTECTOMY WITH RIGHT SENTINEL NODE BIOPSY, LEFT PROPHYLATIC SKIN SPARRING MASTECTOMY;  Surgeon: Claudia Barahona MD;  Location: [facility] OR;  Service: General;  Laterality: N/A;   • US GUIDED LYMPH NODE BIOPSY  2021         Visit Dx:    ICD-10-CM ICD-9-CM   1. Malignant neoplasm of right  female breast, unspecified estrogen receptor status, unspecified site of breast ([facility])  C50.911 174.9              Lymphedema     Row Name 05/26/22 0900             Subjective Pain    Able to rate subjective pain? yes  -SG      Pre-Treatment Pain Level 2  -SG      Post-Treatment Pain Level 1  -SG              Subjective Comments    Subjective Comments Pt reports that she is feeling good today. Some tightness/tenderness in axillas and chest. She has started w/ Taxol and it has brought on different symptoms than A+C such as bone pain. She has her next tx tomorrow  -SG              Lymphedema Assessment    Lymphedema Classification RUE:;at risk/stage 0  -SG      Lymphedema Cancer Related Sx right;axillary dissection;bilateral;simple mastectomy;reconstructive  -SG      Lymphedema Surgery Comments 2/8/2022  -SG      Lymph Nodes Removed # 5  -SG      Positive Lymph Nodes # 5  -SG      Chemo Received yes  -SG      Chemo Treatments #/Timeframe Currently undergoing  -SG      Radiation Therapy Received --  Will complete following chemotherapy  -SG      Infections or Cellulitis? no  -SG              Posture/Observations    Posture- WNL Posture is WNL  -SG      Posture/Observations Comments Forward protective posture post mastectomy/reconstruction and port placement  -SG              MMT (Manual Muscle Testing)    General MMT Comments Generalized weakness post op mastectomy, through chemotherapy. Pt goes to the [facility] for swimming and strengthening.  -SG              Lymphedema Edema Assessment    Edema Assessment Comment Pt does not demo any edema at this time  -SG              Skin Changes/Observations    Location/Assessment Upper Extremity;Upper Quadrant  -SG      Upper Extremity Conditions bilateral:;normal  -SG      Upper Extremity Color/Pigment bilateral:;normal  -SG      Skin Observations Comment Tight soft tissue restrictions/scar tissue bilateral chest/expanders  -SG              Lymphedema Sensation    Lymphedema  Sensation Comments Numbness and/or hyposensitivity w/ expanders  -SG              Manual Lymphatic Drainage    Manual Lymphatic Drainage initial sequence;opened regional lymph nodes;astym  -SG      Initial Sequence supraclavicular  -SG      Supraclavicular right;left  -SG      Opened Regional Lymph Nodes axillary  -SG      Axillary right;left  -SG      Manual Therapy See manual rx for PORi protocol; additional astym/stm lightly around port to address tight soft tissue/scar tissue  -SG              L-Dex Bioimpedence Screening    L-Dex Measurement Extremity RUE  -SG      L-Dex Patient Position Standing  -SG      L-Dex UE Dominate Side Right  -SG      L-Dex UE At Risk Side Right  -SG      L-Dex UE Pre Surgical Value No  -SG      L-Dex UE Baseline Score -2.4  -SG      Skeletal Muscle Mass (%) 29.7 %  -SG      Fat Mass (%) 30.5 %  -SG      Hy-dex -12.4  -SG            User Key  (r) = Recorded By, (t) = Taken By, (c) = Cosigned By    Initials Name Provider Type    Sherly Rosales OTR/L Occupational Therapist                  Therapy Education  Education Details: Continue with HEP  Given: HEP, Symptoms/condition management, Posture/body mechanics  Program: Reinforced  How Provided: Verbal, Demonstration  Provided to: Patient  Level of Understanding: Verbalized     OT Assessment/Plan     Row Name 05/26/22 0900          OT Assessment    Functional Limitations Limitations in functional capacity and performance;Performance in self-care ADL  -SG     Impairments Range of motion;Posture;Pain;Muscle strength;Joint mobility;Impaired flexibility  -SG     Assessment Comments Pt presents w the following: limited ROM bilateral shoulders for overhead tasks; pain and stiffness in chest and UEs which limits ROM for ADLs/IADLs; L shoulder pain; strength: decreased core stabilization and decreased UE/ strength; posture: forward neck and shoulder posture, disrupted scapulo-humeral rhythm; increased tissue tightness over chest, stress  across pectoralis major muscle, and associated ST through shoulder and trunk; w/ tight soft tissue restrictions and scar tissue; at risk for lymphedema in RUE.  She will benefit from continuing with OP OT to address symptoms and promote full return to PLOF.  -SG     OT Diagnosis BrCA  -SG     OT Rehab Potential Excellent  -SG     Patient/caregiver participated in establishment of treatment plan and goals Yes  -SG     Patient would benefit from skilled therapy intervention Yes  -SG            OT Plan    OT Frequency 1x/week  -SG     Planned CPT's? OT EVAL MOD COMPLEXITY: 98894;OT THER ACT EA 15 MIN: 45414YV;OT THER PROC EA 15 MIN: 82882EU;OT SELF CARE/MGMT/TRAIN 15 MIN: 06231;OT MANUAL THERAPY EA 15 MIN: 80301;OT BIS XTRACELL FLUID ANALYSIS: 09491  -     Planned Therapy Interventions (Optional Details) home exercise program;joint mobilization;manual therapy techniques;patient/family education;postural re-education;ROM (Range of Motion);strengthening;stretching  -SG     OT Plan Comments Continue PORi protcol w/ ASTYM/STM as needed; progress ROM and HEP as tolerated.  -           User Key  (r) = Recorded By, (t) = Taken By, (c) = Cosigned By    Initials Name Provider Type    Sherly Rosales OTR/L Occupational Therapist                         Manual Rx (last 36 hours)     Manual Treatments     Row Name 05/26/22 0900             Total Minutes    04004 - OT Manual Therapy Minutes 45  -              Manual Rx 1    Manual Rx 1 Location Arm--Orthopedic: Forearm extensors, biceps muscles, lateral intermuscular septum tension line upper arm, pectoralis major  -      Manual Rx 1 Type Trigger Point Release, Myofascial Bending  -      Manual Rx 1 Duration Trigger Point Release for count of 3 seconds, repeat each section 5 times; Myofascial stretching w/ tissue/muscle bending 3-5 times  -              Manual Rx 2    Manual Rx 2 Location Axilla--Orthopedic: Medial Intermuscular Septum tension line upper arm,  pectoralis minor, subscapularis, latissimus dorsi, teres major  -      Manual Rx 2 Type Trigger Point Release; Myofascial bending  -      Manual Rx 2 Duration Trigger Point Release for count of 3 seconds, repeat each section 5 times; Myofascial stretching w/ tissue/muscle bending 3-5 times  -              Manual Rx 3    Manual Rx 3 Location Upper Arm: Lymph System  -      Manual Rx 3 Type Upper arm fluid clearance of axillary pathway; medial confluence fluid clearance--antecubital fossa; lateral upper arm-cephalic pathway  -      Manual Rx 3 Duration x1 line of treatment  -              Manual Rx 4    Manual Rx 4 Location Forearm--Orthopedic: radius and ulna-interosseous membrane; carpals-joint capsules-metacarpals-interosseous membranes; thenar muscles  -      Manual Rx 4 Type Joint mobilization, Trigger Point Release, Drainage of forearm  -      Manual Rx 4 Duration Joint Mob: each section x5 in each position; drainage of forearm: perform x1 lines of treatment  -            User Key  (r) = Recorded By, (t) = Taken By, (c) = Cosigned By    Initials Name Provider Type    Sherly Rosales OTR/L Occupational Therapist                            Time Calculation:   OT Start Time: 0900  Timed Charges  51915 - OT Manual Therapy Minutes: 45  Total Minutes  Timed Charges Total Minutes: 45   Total Minutes: 45     Therapy Charges for Today     Code Description Service Date Service Provider Modifiers Qty    [id number]  OT MANUAL THERAPY EA 15 MIN 5/26/2022 Sherly Lomas OTR/L GO 3                    ALMA Singh/RAMÓN  5/26/2022

## 2022-06-02 ENCOUNTER — HOSPITAL ENCOUNTER (OUTPATIENT)
Dept: OCCUPATIONAL THERAPY | Facility: HOSPITAL | Age: 47
Setting detail: THERAPIES SERIES
Discharge: HOME | End: 2022-06-02

## 2022-06-02 DIAGNOSIS — C50.911 MALIGNANT NEOPLASM OF RIGHT FEMALE BREAST, UNSPECIFIED ESTROGEN RECEPTOR STATUS, UNSPECIFIED SITE OF BREAST: Primary | ICD-10-CM

## 2022-06-02 PROCEDURE — 97140 MANUAL THERAPY 1/> REGIONS: CPT

## 2022-06-02 PROCEDURE — 97110 THERAPEUTIC EXERCISES: CPT

## 2022-06-02 NOTE — THERAPY PROGRESS REPORT/RE-CERT
Outpatient Occupational Therapy Ortho Progress Note   [place]     Patient Name: Celsa Velez  : 1975  MRN: [id number]  Today's Date: 2022        Visit Date: 2022    Patient Active Problem List   Diagnosis   • IBS (irritable bowel syndrome)   • Vitamin D deficiency   • Hematuria   • Family history of breast cancer in mother   • Malignant neoplasm of right breast in female, estrogen receptor positive (HCC)   • Malignant neoplasm of upper-inner quadrant of right female breast (HCC)        Past Medical History:   Diagnosis Date   • Acute sinusitis    • Allergic    • Arthritis    • JRA (juvenile rheumatoid arthritis) (HCC)    • Malignant neoplasm of right breast in female, estrogen receptor positive (HCC) 2021    Wild Breast Mastectomies on 22   • Seizure disorder (HCC)     Possibly puberty/hormonal related; has not had a seizure since her teens   • UTI (urinary tract infection)     Last Impression: 2014  Send urine for culture. Treat with cipro until results are  available.  Almaz Cavazos (Internal Medicine)        Past Surgical History:   Procedure Laterality Date   • BREAST BIOPSY Right     us bx   • BREAST RECONSTRUCTION, BREAST TISSUE EXPANDER INSERTION Bilateral 2022    Procedure: IMMEDIATE BILATERAL BREAST RECONSTRUCTION WITH TISSUE EXPANDER;  Surgeon: Tim Brown MD;  Location: [facility] OR;  Service: Plastics;  Laterality: Bilateral;   • COLONOSCOPY     • DILATION AND CURETTAGE, DIAGNOSTIC / THERAPEUTIC      Dilation And Curettage Of Cervical Stump   • KNEE SURGERY     • MASTECTOMY     • MASTECTOMY W/ SENTINEL NODE BIOPSY N/A 2022    Procedure: BREAST MASTECTOMY WITH RIGHT SENTINEL NODE BIOPSY, LEFT PROPHYLATIC SKIN SPARRING MASTECTOMY;  Surgeon: Claudia Barahona MD;  Location: [facility] OR;  Service: General;  Laterality: N/A;   • US GUIDED LYMPH NODE BIOPSY  2021         Visit Dx:    ICD-10-CM ICD-9-CM   1. Malignant neoplasm of right female  breast, unspecified estrogen receptor status, unspecified site of breast ([facility])  C50.911 174.9              Lymphedema     Row Name 06/02/22 0800             Subjective Pain    Able to rate subjective pain? yes  -SG      Pre-Treatment Pain Level 3  -SG      Post-Treatment Pain Level 2  -SG              Subjective Comments    Subjective Comments Pt reports that she was able to get in the pool and swim earlier this week and that she had some soreness after in traps, neck, chest, and back. She has an appt June 9th to schedule port removal and another round of chemo on the 10th.  -SG              Lymphedema Assessment    Lymphedema Classification RUE:;at risk/stage 0  -SG      Lymphedema Cancer Related Sx right;axillary dissection;bilateral;simple mastectomy;reconstructive  -SG      Lymphedema Surgery Comments 2/8/2022  -SG      Lymph Nodes Removed # 5  -SG      Positive Lymph Nodes # 5  -SG      Chemo Received yes  -SG      Chemo Treatments #/Timeframe Currently undergoing  -SG      Radiation Therapy Received --  Will complete following chemotherapy  -SG      Infections or Cellulitis? no  -SG              Posture/Observations    Posture- WNL Posture is WNL  -SG      Posture/Observations Comments Forward protective posture post mastectomy/reconstruction and port placement  -SG              Right Upper Ext    Rt Shoulder Abduction AROM 175  -SG      Rt Shoulder Flexion AROM 175  -SG      Rt Shoulder External Rotation AROM WFL  -SG      Rt Shoulder Internal Rotation AROM WFL  -SG              Left Upper Ext    Lt Shoulder Abduction AROM 170  -SG      Lt Shoulder Flexion AROM 170  -SG      Lt Shoulder External Rotation AROM WFL  -SG      Lt Shoulder Internal Rotation AROM WFL  -SG              MMT (Manual Muscle Testing)    General MMT Comments Generalized weakness post op mastectomy, through chemotherapy. Pt goes to the [facility] for swimming and strengthening.  -SG              Lymphedema Edema Assessment    Edema Assessment  Comment Pt does not demo any edema at this time  -SG              Skin Changes/Observations    Location/Assessment Upper Extremity;Upper Quadrant  -SG      Upper Extremity Conditions bilateral:;normal  -SG      Upper Extremity Color/Pigment bilateral:;normal  -SG      Skin Observations Comment Tight soft tissue restrictions/scar tissue bilateral chest/expanders  -SG              Lymphedema Sensation    Lymphedema Sensation Comments Numbness and/or hyposensitivity w/ expanders  -SG              Manual Lymphatic Drainage    Manual Lymphatic Drainage initial sequence;opened regional lymph nodes;astym  -SG      Initial Sequence supraclavicular  -SG      Supraclavicular right;left  -SG      Opened Regional Lymph Nodes axillary  -SG      Axillary right;left  -SG      Astym upper traps;neck  -SG      Neck bilateral:;posterior  -SG      Upper Traps right;left  -SG      Manual Therapy See manual rx for PORi protocol; additional astym/stm lightly around port to address tight soft tissue/scar tissue  -SG              L-Dex Bioimpedence Screening    L-Dex Measurement Extremity RUE  -SG      L-Dex Patient Position Standing  -SG      L-Dex UE Dominate Side Right  -SG      L-Dex UE At Risk Side Right  -SG      L-Dex UE Pre Surgical Value No  -SG      L-Dex UE Baseline Score -2.4  -SG      Skeletal Muscle Mass (%) 29.7 %  -SG      Fat Mass (%) 30.5 %  -SG      Hy-dex -12.4  -SG            User Key  (r) = Recorded By, (t) = Taken By, (c) = Cosigned By    Initials Name Provider Type    Sherly Rosales, OTR/L Occupational Therapist                  Therapy Education  Education Details: Upper trap stretches, shoulder stretches before and after swimming  Given: HEP, Symptoms/condition management, Posture/body mechanics  Program: Reinforced  How Provided: Verbal, Demonstration  Provided to: Patient  Level of Understanding: Verbalized     OT Assessment/Plan     Row Name 06/02/22 0800          OT Assessment    Functional Limitations  Limitations in functional capacity and performance;Performance in self-care ADL  -SG     Impairments Range of motion;Posture;Pain;Muscle strength;Joint mobility;Impaired flexibility  -SG     Assessment Comments Pt presents w the following: limited ROM bilateral shoulders for overhead tasks; pain and stiffness in chest and UEs which limits ROM for ADLs/IADLs; L shoulder pain; strength: decreased core stabilization and decreased UE/ strength; posture: forward neck and shoulder posture, disrupted scapulo-humeral rhythm; increased tissue tightness over chest, stress across pectoralis major muscle, and associated ST through shoulder and trunk; w/ tight soft tissue restrictions and scar tissue; at risk for lymphedema in RUE.  She will benefit from continuing with OP OT to address symptoms and promote full return to PLOF.  -SG     OT Diagnosis BrCA  -SG     OT Rehab Potential Excellent  -SG     Patient/caregiver participated in establishment of treatment plan and goals Yes  -SG     Patient would benefit from skilled therapy intervention Yes  -SG            OT Plan    OT Frequency 1x/week  -SG     Planned CPT's? OT EVAL MOD COMPLEXITY: 19802;OT THER ACT EA 15 MIN: [id number];OT THER PROC EA 15 MIN: 26540FF;OT SELF CARE/MGMT/TRAIN 15 MIN: 86112;OT MANUAL THERAPY EA 15 MIN: 09614;OT BIS XTRACELL FLUID ANALYSIS: 69858  -SG     Planned Therapy Interventions (Optional Details) home exercise program;joint mobilization;manual therapy techniques;patient/family education;postural re-education;ROM (Range of Motion);strengthening;stretching  -SG     OT Plan Comments Continue PORi protcol w/ ASTYM/STM as needed; progress ROM and HEP as tolerated.  -SG           User Key  (r) = Recorded By, (t) = Taken By, (c) = Cosigned By    Initials Name Provider Type    Sherly Rosales OTR/L Occupational Therapist                  OT Goals     Row Name 06/02/22 0800          OT Short Term Goals    STG Date to Achieve 05/07/22  -SG     STG 1 • Pt  will be independent with HEP for shoulder ROM and soft tissue flexibility.  -SG     STG 1 Progress Met  -SG     STG 2 • Bilateral chest soft tissue restrictions will demo at least 50% improvement.  -SG     STG 2 Progress Met  -SG     STG 3 • Pt will be independent w/ gentle self-soft tissue massage  -     STG 3 Progress Met  -SG            Long Term Goals    LTG 1 • Pt will restore shoulder AROM to PLOF to improve 1 and 2 handed functional activity ability  -SG     LTG 1 Progress Met  -SG     LTG 2 • Pt will restore functional scoring using Quick DASH assessment tool to pre-operative amounts (per pt report, 0) to ensure full return to baseline function.  -SG     LTG 2 Progress Progressing  -SG     LTG 3 • Pt will restore full upright posture per pt perception to promote greater functional movement.  -SG     LTG 3 Progress Progressing  -SG     LTG 4 • Pt will demonstrate awareness of individualized lymphedema precautions based on personal risk factors and when to seek medical attn. for assessment of early signs of lymphedema or cellulitis of the affected region  -     LTG 4 Progress Met  -            Time Calculation    OT Goal Re-Cert Due Date 07/06/22  -           User Key  (r) = Recorded By, (t) = Taken By, (c) = Cosigned By    Initials Name Provider Type    Sherly Rosales, OTR/L Occupational Therapist                    Manual Rx (last 36 hours)     Manual Treatments     Row Name 06/02/22 0800             Total Minutes    68760 - OT Manual Therapy Minutes 45  -              Manual Rx 1    Manual Rx 1 Location Arm--Orthopedic: Forearm extensors, biceps muscles, lateral intermuscular septum tension line upper arm, pectoralis major  -      Manual Rx 1 Type Trigger Point Release, Myofascial Bending  -      Manual Rx 1 Duration Trigger Point Release for count of 3 seconds, repeat each section 5 times; Myofascial stretching w/ tissue/muscle bending 3-5 times  -              Manual Rx 2    Manual Rx 2  Location Axilla--Orthopedic: Medial Intermuscular Septum tension line upper arm, pectoralis minor, subscapularis, latissimus dorsi, teres major  -      Manual Rx 2 Type Trigger Point Release; Myofascial bending  -SG      Manual Rx 2 Duration Trigger Point Release for count of 3 seconds, repeat each section 5 times; Myofascial stretching w/ tissue/muscle bending 3-5 times  -              Manual Rx 3    Manual Rx 3 Location Upper Arm: Lymph System  -      Manual Rx 3 Type Upper arm fluid clearance of axillary pathway; medial confluence fluid clearance--antecubital fossa; lateral upper arm-cephalic pathway  -      Manual Rx 3 Duration x1 line of treatment  -              Manual Rx 4    Manual Rx 4 Location Forearm--Orthopedic: radius and ulna-interosseous membrane; carpals-joint capsules-metacarpals-interosseous membranes; thenar muscles  -      Manual Rx 4 Type Joint mobilization, Trigger Point Release, Drainage of forearm  -      Manual Rx 4 Duration Joint Mob: each section x5 in each position; drainage of forearm: perform x1 lines of treatment  -            User Key  (r) = Recorded By, (t) = Taken By, (c) = Cosigned By    Initials Name Provider Type    Sherly Rosales OTR/L Occupational Therapist                Outcome Measure Options: Quick DASH  Quick DASH  Open a tight or new jar.: Mild Difficulty  Do heavy household chores (e.g., wash walls, wash floors): Mild Difficulty  Carry a shopping bag or briefcase: No Difficulty  Wash your back: No Difficulty  Use a knife to cut food: No Difficulty  Recreational activities in which you take some force or impact through your arm, should or hand (e.g. golf, hammering, tennis, etc.): Mild Difficulty  During the past week, to what extent has your arm, shoulder, or hand problem interfered with your normal social activites with family, friends, neighbors or groups?: Slightly  During the past week, were you limited in your work or other regular daily  activities as a result of your arm, shoulder or hand problem?: Moderately Limited  Arm, Shoulder, or hand pain: None  Tingling (pins and needles) in your arm, shoulder, or hand: None  During the past week, how much difficulty have you had sleeping because of the pain in your arm, shoulder or hand?: No difficulty  Number of Questions Answered: 11  Quick DASH Score: 13.64           Time Calculation:   OT Start Time: 0800  Timed Charges  02825 - OT Therapeutic Exercise Minutes: 8  69317 - OT Manual Therapy Minutes: 45  Total Minutes  Timed Charges Total Minutes: 53   Total Minutes: 53     Therapy Charges for Today     Code Description Service Date Service Provider Modifiers Qty    [id number]  OT THER PROC EA 15 MIN 6/2/2022 Sherly Lomas OTR/L GO 1    [id number]  OT MANUAL THERAPY EA 15 MIN 6/2/2022 Sherly Lomas OTR/L GO 3                    ALMA Singh/RAMÓN  6/2/2022

## 2022-06-08 ENCOUNTER — TELEPHONE (OUTPATIENT)
Dept: ONCOLOGY | Facility: HOSPITAL | Age: 47
End: 2022-06-08

## 2022-06-09 ENCOUNTER — TELEPHONE (OUTPATIENT)
Dept: ONCOLOGY | Facility: HOSPITAL | Age: 47
End: 2022-06-09

## 2022-06-09 ENCOUNTER — DOCUMENTATION (OUTPATIENT)
Dept: GENETICS | Facility: HOSPITAL | Age: 47
End: 2022-06-09

## 2022-06-09 NOTE — PROGRESS NOTES
Celsa Velez, a 46-year-old female, was seen for genetic counseling due to her personal and family history of breast cancer. Ms. Velez was recently diagnosed with a right breast cancer at age 46 and had a bilateral mastectomy. She has colonoscopies every five years and does not have a personal history of colon polyps. She retains her uterus and ovaries. Ms. Velez was interested in discussing her risk for a hereditary cancer syndrome and genetic testing options. Testing was ordered via the CancerNext-Expanded panel. Genetic testing was negative for pathogenic mutations in BRCA1/2 and 75 additional genes on the CancerNext-Expanded panel.  These normal results were discussed with Ms. Velez by telephone on 6/9/2022.      PERTINENT FAMILY HISTORY: (See attached pedigree)   Mother:    Breast cancer, 68  Father:     Prostate cancer, 60s  Pat. Grandmother:   Breast cancer, 77  Pat. Great Aunt:   Breast cancer  Pat. Grandfather:   Kidney cancer, 66  Pat. Great Grandfather:  Colon cancer  Mat. Grandfather:   Lung cancer, 66  Mat. 1st cousin once removed (x2): Breast cancer    We do not have medical records regarding any of these diagnoses.      RISK ASSESSMENT:  Ms. Velez’s personal and family history of breast cancer raised the question of a hereditary cancer syndrome. We discussed BRCA1/2 testing as well as the option of pursuing a panel that would test for other genes that are also known to impact cancer risk.  Ms. Velez clearly meets NCCN guidelines criteria for BRCA1/2 testing based on her personal history of breast cancer diagnosed before age 50 and family history of breast cancer.  These risk assessments are based on the family history information provided at the time of the appointment, and could change in the future should new information be obtained.    GENETIC COUNSELING: We reviewed the family history information in detail. Cases of cancer follow three general patterns: sporadic,  familial, and hereditary.  While most cancer is sporadic, some cases appear to occur in family clusters.  These cases are said to be familial and account for 10-20% of cancer cases.  Familial cases may be due to a combination of shared genes and environmental factors among family members.  In even fewer families, the cancer is said to be inherited, and the genes responsible for the cancer are known.  Family histories typical of hereditary cancer syndromes usually include multiple first- and second-degree relatives diagnosed with cancer types that define a syndrome.  These cases tend to be diagnosed at younger-than-expected ages and can be bilateral or multifocal.  The cancer in these families follows an autosomal dominant inheritance pattern, which indicates the likely presence of a mutation in a cancer susceptibility gene.  Children and siblings of an individual believed to carry this mutation have a 50% chance of inheriting that mutation, thereby inheriting the increased risk to develop cancer.  These mutations can be passed down from the maternal or the paternal lineage.    Hereditary breast cancer accounts for 5-10% of all cases of breast cancer.  A significant proportion of hereditary breast and ovarian cancer can be attributed to mutations in the BRCA1 and BRCA2 genes.  Mutations in these genes confer an increased risk for breast cancer, ovarian cancer, male breast cancer, prostate cancer, and pancreatic cancer. Women with a BRCA1 or BRCA2 mutation who have already been diagnosed with breast cancer have a 40-60% lifetime risk of a second breast cancer. Women with a BRCA1 or BRCA2 mutation have up to a 44% risk of ovarian cancer.  We discussed that there are other genes that are known to be associated with an increased risk for cancer and the comprehensive multigene panel approach to testing. Some of these genes have well defined cancer risks and established management guidelines.  Other genes that can be  tested for have been more recently described, and there may be less data regarding the risks and therefore may not have established management guidelines.    GENETIC TESTING:  The risks, benefits and limitations of genetic testing and implications for clinical management following testing were reviewed.  DNA test results can influence decisions regarding screening, prevention and surgical management.  Genetic testing can have significant psychological implications for both individuals and families.  Also discussed was the possibility of employment and insurance discrimination based on genetic test results and the laws in place to prevent this ([place]NA), as well as the limitations of these laws.    We discussed panel testing, which would involve testing multiple genes simultaneously that are associated with increased cancer risk. The benefits and limitations of genetic testing were discussed, as well as the implications of a positive or negative test result. In some cases, genetic test results may be informative or may be ambiguous due to the identification of a genetic variant of uncertain significance (VUS). These variants may or may not be associated with an increased cancer risk.  With multigene panel testing, it is not uncommon for a variant of uncertain significance (VUS) to be identified.  If a VUS is identified, testing family members is typically not recommended and screening recommendations are made based on the family history.  The laboratories that perform genetic testing work to reclassify the VUS and send out an amended report if and when a VUS is reclassified.  The majority of variant findings are ultimately reclassified to a negative result.  Given her personal and family history, a negative test result would not eliminate all cancer risk to her relatives, although the risk would not be as high as it would with positive genetic testing.      TEST RESULTS:  Genetic testing was negative for known  pathogenic mutations by sequencing and rearrangement testing for the genes on the CancerNext-Expanded panel (see attached results). This negative result greatly lowers but does not eliminate the risk of a hereditary cancer syndrome for Ms. Velez. This assessment is based on the information provided at time of consultation.    CANCER SCREENING: Ms. Velez’s surveillance and management should be determined by her oncology team. Despite the negative genetic test results, Ms. Velez’s female relatives may have a somewhat increased lifetime risk for breast cancer based on family history. Female relatives could have a risk assessment performed using a family history-based model, such as the Tyrer-Cuzick model, to determine their individual risks.    Given the increased risk, options available to individuals with a high lifetime risk for breast cancer were briefly discussed.   Surveillance for individuals with a high lifetime risk of breast cancer (>20%, versus the average risk of 12%), based on NCCN guidelines, would consist of semi-annual clinical breast exams and monthly self-breast exams starting by age 18 and annual mammography starting 10 years younger than the earliest diagnosis in a close relative, or starting by age 40, whichever is earliest.  According to an American Cancer Society expert panel, annual breast MRI should be offered to women whose lifetime risk of breast cancer is 20-25 percent or more, also starting by age 40 or earlier if indicated by family history.      PLAN: Genetic counseling remains available to Ms. Velez and her family. She is welcome to contact us with any questions or concerns at [phone].        Almaz Seaman MS, [facility] [place] [facility]  Licensed Certified Genetic Counselor       Cc: MD Jonathan Centeno MD

## 2022-06-10 ENCOUNTER — TRANSCRIBE ORDERS (OUTPATIENT)
Dept: ADMINISTRATIVE | Facility: HOSPITAL | Age: 47
End: 2022-06-10

## 2022-06-10 DIAGNOSIS — Z11.59 SPECIAL SCREENING EXAMINATION FOR VIRAL DISEASE: Primary | ICD-10-CM

## 2022-06-26 NOTE — PROGRESS NOTES
RE-EVALUATION    PATIENT:                                                      Celsa Velez  :                                                          1975  DATE:                          2022   DIAGNOSIS:     Malignant neoplasm of right breast in female, estrogen receptor positive (HCC)  - Stage IB (cT1c, cN1, cM0, G2, ER+, TX+, HER2-)  - Stage IB (pT1c, pN2a, cM0, G2, ER+, TX+, HER2-)         BRIEF HISTORY: DrDesmond Velez  is a very pleasant 46 y.o. female found on mammogram to have a suspicious 1.3 cm irregular mass in the right 2:00 subareolar region.  Other nodular asymmetries were thought to be cysts and benign appearing oval masses were consistent with fibrocystic change.  She had unilateral right axillary adenopathy.   Ultrasound-guided biopsy of the breast mass was positive for invasive ductal carcinoma, histologic grade 2 with intermediate grade DCIS.  The tumor was ER/TX positive HER-2/kassie negative.  Biopsy of the right 2.2 cm axillary lymph node was positive for metastatic ductal carcinoma.  2022 she underwent bilateral mastectomies by Dr. Barahona with immediate reconstruction by Dr. Tim Brown.  He placed tissue expanders in the prepectoral position with the use of AlloDerm.  The left breast was negative.  Pathology from the right breast revealed multifocal invasive ductal adenocarcinoma, grade 2.  Tumor sizes were 1.1 cm, 0.8 cm and 0.4 cm. Margins were negative by 7 mm from the superficial margin.  5/5 lymph nodes were positive for tumor in the right axilla.  There was focal extracapsular extension of 1 mm.   She has completed chemotherapy of AC and Taxol per Dr. Cummins in [place].  Dr. Velez is here to proceed with postoperative radiation.      Allergies   Allergen Reactions   • Hydrocodone Nausea And Vomiting     Pt states she is unsure if reaction was due to waking up from anesthesia or from the pain med   • Penicillins Hives       Review of  "Systems   Constitutional: Positive for appetite change, fatigue and unexpected weight change (10 pound weight loss with chemo).   HENT:          Dry mouth   Endocrine: Positive for hot flashes.   Musculoskeletal: Positive for arthralgias (with chemo).   Neurological: Positive for numbness (fingertips, Raynaud's and chemo).   Psychiatric/Behavioral: Positive for sleep disturbance.   All other systems reviewed and are negative.          Objective   VITAL SIGNS:   Vitals:    06/27/22 0934   BP: 115/58   Pulse: 86   Resp: 16   Temp: 97.1 °F (36.2 °C)   TempSrc: Tympanic   SpO2: 98%   Weight: 54 kg (119 lb 1.6 oz)   Height: 165.1 cm (65\")   PainSc: 0-No pain        Karnofsky score: 90   {KPS:  **    Physical Exam  Vitals and nursing note reviewed.   Constitutional:       Appearance: Normal appearance. She is normal weight.   Cardiovascular:      Rate and Rhythm: Normal rate and regular rhythm.      Heart sounds: Normal heart sounds.   Pulmonary:      Effort: Pulmonary effort is normal.      Breath sounds: Normal breath sounds.   Neurological:      Mental Status: She is alert.     The breast exam reveals bilateral tissue expanders in place.  They are not expanded to the fullest capacity.         The following portions of the patient's history were reviewed and updated as appropriate: allergies, current medications, past family history, past medical history, past social history, past surgical history and problem list.    Diagnoses and all orders for this visit:    Malignant neoplasm of central portion of right breast in female, estrogen receptor positive (HCC)    Other orders  -     ondansetron (ZOFRAN) 8 MG tablet  -     tamoxifen (NOLVADEX) 20 MG chemo tablet      IMPRESSION:  Dr. Celsa Velez  is a  46 y.o. who underwent bilateral mastectomies by Dr. Barahona with immediate reconstruction by Dr. Tim Brown.  He placed tissue expanders in the prepectoral position with the use of AlloDerm.  The left breast " was negative.  Pathology from the right breast revealed multifocal invasive ductal adenocarcinoma, grade 2.  Tumor sizes were 1.1 cm, 0.8 cm and 0.4 cm. Margins were negative by 7 mm from the superficial margin.  5/5 lymph nodes were positive for tumor in the right axilla.  There was focal extracapsular extension of 1 mm.  She has completed chemotherapy of AC and Taxol per Dr. Cummins in [place].  Dr. Velez is here to proceed with postoperative radiation.       RECOMMENDATIONS:   Dr. Velez had multiple positive lymph nodes.  I recommend post operative radiation to decrease the risk of local recurrence.  The pros and cons, risks and benefits of treatment were discussed.  We will treat to   45 Hong in 25 fractions to the chest wall/implants and supraclavicular region to encompass the regional lymph nodes.    We will continue our treatment planning today.  We will start shortly.  It is a pleasure to see Dr. Velez.     Roxana Martins MD    Total time of patient care on day of service including time prior to, face to face with patient, and following visit spent in reviewing records, lab results, imaging studies, discussion with patient, and documentation/charting was > 20 minutes.

## 2022-06-27 ENCOUNTER — HOSPITAL ENCOUNTER (OUTPATIENT)
Dept: RADIATION ONCOLOGY | Facility: HOSPITAL | Age: 47
Setting detail: RADIATION/ONCOLOGY SERIES
Discharge: HOME | End: 2022-06-27

## 2022-06-27 ENCOUNTER — OFFICE VISIT (OUTPATIENT)
Dept: RADIATION ONCOLOGY | Facility: HOSPITAL | Age: 47
End: 2022-06-27

## 2022-06-27 VITALS
WEIGHT: 119.1 LBS | TEMPERATURE: 97.1 F | SYSTOLIC BLOOD PRESSURE: 115 MMHG | HEIGHT: 65 IN | RESPIRATION RATE: 16 BRPM | OXYGEN SATURATION: 98 % | HEART RATE: 86 BPM | BODY MASS INDEX: 19.84 KG/M2 | DIASTOLIC BLOOD PRESSURE: 58 MMHG

## 2022-06-27 DIAGNOSIS — Z17.0 MALIGNANT NEOPLASM OF CENTRAL PORTION OF RIGHT BREAST IN FEMALE, ESTROGEN RECEPTOR POSITIVE: ICD-10-CM

## 2022-06-27 DIAGNOSIS — C50.111 MALIGNANT NEOPLASM OF CENTRAL PORTION OF RIGHT BREAST IN FEMALE, ESTROGEN RECEPTOR POSITIVE: ICD-10-CM

## 2022-06-27 PROCEDURE — G0463 HOSPITAL OUTPT CLINIC VISIT: HCPCS | Performed by: RADIOLOGY

## 2022-06-27 PROCEDURE — 77333 RADIATION TREATMENT AID(S): CPT | Performed by: RADIOLOGY

## 2022-06-27 PROCEDURE — 77290 THER RAD SIMULAJ FIELD CPLX: CPT | Performed by: RADIOLOGY

## 2022-06-27 RX ORDER — ONDANSETRON HYDROCHLORIDE 8 MG/1
TABLET, FILM COATED ORAL
COMMUNITY
Start: 2022-04-29 | End: 2022-09-06

## 2022-06-27 RX ORDER — TAMOXIFEN CITRATE 20 MG/1
20 TABLET ORAL DAILY
COMMUNITY
Start: 2022-06-24

## 2022-06-29 PROCEDURE — 77300 RADIATION THERAPY DOSE PLAN: CPT | Performed by: RADIOLOGY

## 2022-06-29 PROCEDURE — 77334 RADIATION TREATMENT AID(S): CPT | Performed by: RADIOLOGY

## 2022-06-29 PROCEDURE — 77295 3-D RADIOTHERAPY PLAN: CPT | Performed by: RADIOLOGY

## 2022-07-05 ENCOUNTER — APPOINTMENT (OUTPATIENT)
Dept: PREADMISSION TESTING | Facility: HOSPITAL | Age: 47
End: 2022-07-05

## 2022-07-05 ENCOUNTER — HOSPITAL ENCOUNTER (OUTPATIENT)
Dept: RADIATION ONCOLOGY | Facility: HOSPITAL | Age: 47
Discharge: HOME | End: 2022-07-05

## 2022-07-05 ENCOUNTER — HOSPITAL ENCOUNTER (OUTPATIENT)
Dept: RADIATION ONCOLOGY | Facility: HOSPITAL | Age: 47
Setting detail: RADIATION/ONCOLOGY SERIES
Discharge: HOME | End: 2022-07-05

## 2022-07-05 VITALS — BODY MASS INDEX: 19.4 KG/M2 | WEIGHT: 116.6 LBS

## 2022-07-05 PROCEDURE — U0004 COV-19 TEST NON-CDC HGH THRU: HCPCS | Performed by: SURGERY

## 2022-07-05 PROCEDURE — 77280 THER RAD SIMULAJ FIELD SMPL: CPT | Performed by: RADIOLOGY

## 2022-07-06 ENCOUNTER — DOCUMENTATION (OUTPATIENT)
Dept: NUTRITION | Facility: HOSPITAL | Age: 47
End: 2022-07-06

## 2022-07-06 ENCOUNTER — HOSPITAL ENCOUNTER (OUTPATIENT)
Dept: RADIATION ONCOLOGY | Facility: HOSPITAL | Age: 47
Discharge: HOME | End: 2022-07-06

## 2022-07-06 LAB — SARS-COV-2 RNA PNL SPEC NAA+PROBE: NOT DETECTED

## 2022-07-06 PROCEDURE — 77412 RADIATION TX DELIVERY LVL 3: CPT | Performed by: RADIOLOGY

## 2022-07-06 NOTE — PROGRESS NOTES
"Outpatient Oncology Nutrition     Reason for Visit:     Oncology Nutrition Screening and Patient Education    Patient Name:  Celsa Velez    :  1975    MRN:  [id number]    Date of Encounter: 2022    Nutrition Assessment   Diagnosis:  [id number] ER+ MA+ Her2 negative invasive ductal carcinoma of the right breast    Surgery:   Dr. GARCIA. / RIGHT BREAST MASTECTOMY WITH RIGHT SENTINEL NODE BIOPSY, LEFT PROPHYLATIC SKIN SPARRING MASTECTOMY; IMMEDIATE BILATERAL BREAST RECONSTRUCTION WITH TISSUE EXPANDER    Chemotherapy:  Doxorubicin and cyclophosphamide followed by paclitaxel on an every 2 week \"dose dense\" schedule - [facility]      Patient Active Problem List   Diagnosis   • IBS (irritable bowel syndrome)   • Vitamin D deficiency   • Hematuria   • Family history of breast cancer in mother   • Malignant neoplasm of right breast in female, estrogen receptor positive (HCC)   • Malignant neoplasm of upper-inner quadrant of right female breast (HCC)       Food / Nutrition Related History       Hydration Status     Goal: 60 ounces    Enteral Feeding   NA    Anthropometric Measurements     Height:    Ht Readings from Last 1 Encounters:   22 165.1 cm (65\")       Weight:    Wt Readings from Last 1 Encounters:   22 52.9 kg (116 lb 9.6 oz)       BMI: 19.40 / Low Normal    Weight Change:  10 lbs weight loss during period of diagnosis / treatment - 8% weight loss    Review of Lab Data (Time Frame - 1 month / 2 month)   Reviewed 22    Medication Review   Reviewed 22    Nutrition Focused Physical Findings       Nutrition Impact Symptoms      Fatigue  Physical Activity      Not my normal self, but able to be up and about with fairly normal activities    Current Nutritional Intake     Oral diet:  Regular / Plant based    Malnutrition Risk Assessment     Recent weight loss over the past 6 months:  Yes    How much weight loss:  1 = 2-13 lbs    Eating poorly because of a decreased " appetite:  1 = Yes    Malnutrition Screening Score:     MST = 2 more Patient at risk for malnutrition     Nutrition Diagnosis     Problem Involuntary weight loss    Etiology Diagnosis / treatment related nutritional impact symptoms   Signs / Symptoms 10 lbs weight loss (8% weight loss)     Nutrition Intervention   Initial consultation with patient during RAD ONC status checks.  Patient is s/p chemo, stating that most of the nutritional impact symptoms that she experienced (dysgeusia, slight nausea and constipation) have resolved.  Since diagnosis, patient has moved away from her regular diet to one of more plant based focus; with this change and coupled with nutritional impact symptoms related to chemotherapy, she has lost approximately 10 lbs from her UBW.    Reiterated the importance of nutrition and applauded patient for the changes that she has initiated in her diet.  Addressed questions regarding OTC supplements, plant based focus; discussed weight stablization. Discussed possible side effect of fatigue with radiation treatment.      Packet of information provided to the patient with plant based recipes, easy to prepare lunch ideas and general information regarding food selection, grocery shopping, etc.  Patient has my contact information for any additional questions.    Goal       Monitoring / Evaluation     Will follow as indicated.

## 2022-07-07 ENCOUNTER — HOSPITAL ENCOUNTER (OUTPATIENT)
Dept: RADIATION ONCOLOGY | Facility: HOSPITAL | Age: 47
Discharge: HOME | End: 2022-07-07

## 2022-07-07 PROCEDURE — 77336 RADIATION PHYSICS CONSULT: CPT | Performed by: RADIOLOGY

## 2022-07-07 PROCEDURE — 77412 RADIATION TX DELIVERY LVL 3: CPT | Performed by: RADIOLOGY

## 2022-07-08 ENCOUNTER — HOSPITAL ENCOUNTER (OUTPATIENT)
Dept: RADIATION ONCOLOGY | Facility: HOSPITAL | Age: 47
Discharge: HOME | End: 2022-07-08

## 2022-07-08 PROCEDURE — 77412 RADIATION TX DELIVERY LVL 3: CPT | Performed by: RADIOLOGY

## 2022-07-11 ENCOUNTER — HOSPITAL ENCOUNTER (OUTPATIENT)
Dept: RADIATION ONCOLOGY | Facility: HOSPITAL | Age: 47
Discharge: HOME | End: 2022-07-11

## 2022-07-11 PROCEDURE — 77412 RADIATION TX DELIVERY LVL 3: CPT | Performed by: RADIOLOGY

## 2022-07-12 ENCOUNTER — HOSPITAL ENCOUNTER (OUTPATIENT)
Dept: RADIATION ONCOLOGY | Facility: HOSPITAL | Age: 47
Discharge: HOME | End: 2022-07-12

## 2022-07-12 VITALS — BODY MASS INDEX: 20.34 KG/M2 | WEIGHT: 122.2 LBS

## 2022-07-12 PROCEDURE — 77412 RADIATION TX DELIVERY LVL 3: CPT | Performed by: RADIOLOGY

## 2022-07-12 PROCEDURE — 77417 THER RADIOLOGY PORT IMAGE(S): CPT | Performed by: RADIOLOGY

## 2022-07-13 ENCOUNTER — HOSPITAL ENCOUNTER (OUTPATIENT)
Dept: RADIATION ONCOLOGY | Facility: HOSPITAL | Age: 47
Discharge: HOME | End: 2022-07-13

## 2022-07-13 PROCEDURE — 77412 RADIATION TX DELIVERY LVL 3: CPT | Performed by: RADIOLOGY

## 2022-07-14 ENCOUNTER — HOSPITAL ENCOUNTER (OUTPATIENT)
Dept: RADIATION ONCOLOGY | Facility: HOSPITAL | Age: 47
Discharge: HOME | End: 2022-07-14

## 2022-07-14 PROCEDURE — 77412 RADIATION TX DELIVERY LVL 3: CPT | Performed by: RADIOLOGY

## 2022-07-14 PROCEDURE — 77336 RADIATION PHYSICS CONSULT: CPT | Performed by: RADIOLOGY

## 2022-07-15 ENCOUNTER — HOSPITAL ENCOUNTER (OUTPATIENT)
Dept: RADIATION ONCOLOGY | Facility: HOSPITAL | Age: 47
Discharge: HOME | End: 2022-07-15

## 2022-07-15 PROCEDURE — 77412 RADIATION TX DELIVERY LVL 3: CPT | Performed by: RADIOLOGY

## 2022-07-18 ENCOUNTER — HOSPITAL ENCOUNTER (OUTPATIENT)
Dept: RADIATION ONCOLOGY | Facility: HOSPITAL | Age: 47
Discharge: HOME | End: 2022-07-18

## 2022-07-18 PROCEDURE — 77412 RADIATION TX DELIVERY LVL 3: CPT | Performed by: RADIOLOGY

## 2022-07-19 ENCOUNTER — HOSPITAL ENCOUNTER (OUTPATIENT)
Dept: RADIATION ONCOLOGY | Facility: HOSPITAL | Age: 47
Discharge: HOME | End: 2022-07-19

## 2022-07-19 VITALS — WEIGHT: 121.3 LBS | BODY MASS INDEX: 20.19 KG/M2

## 2022-07-19 PROCEDURE — 77412 RADIATION TX DELIVERY LVL 3: CPT | Performed by: RADIOLOGY

## 2022-07-19 PROCEDURE — 77417 THER RADIOLOGY PORT IMAGE(S): CPT | Performed by: RADIOLOGY

## 2022-07-20 ENCOUNTER — HOSPITAL ENCOUNTER (OUTPATIENT)
Dept: RADIATION ONCOLOGY | Facility: HOSPITAL | Age: 47
Discharge: HOME | End: 2022-07-20

## 2022-07-20 PROCEDURE — 77412 RADIATION TX DELIVERY LVL 3: CPT | Performed by: RADIOLOGY

## 2022-07-21 ENCOUNTER — HOSPITAL ENCOUNTER (OUTPATIENT)
Dept: RADIATION ONCOLOGY | Facility: HOSPITAL | Age: 47
Discharge: HOME | End: 2022-07-21

## 2022-07-21 PROCEDURE — 77336 RADIATION PHYSICS CONSULT: CPT | Performed by: RADIOLOGY

## 2022-07-21 PROCEDURE — 77412 RADIATION TX DELIVERY LVL 3: CPT | Performed by: RADIOLOGY

## 2022-07-22 ENCOUNTER — HOSPITAL ENCOUNTER (OUTPATIENT)
Dept: RADIATION ONCOLOGY | Facility: HOSPITAL | Age: 47
Discharge: HOME | End: 2022-07-22

## 2022-07-22 PROCEDURE — 77412 RADIATION TX DELIVERY LVL 3: CPT | Performed by: RADIOLOGY

## 2022-07-25 ENCOUNTER — HOSPITAL ENCOUNTER (OUTPATIENT)
Dept: RADIATION ONCOLOGY | Facility: HOSPITAL | Age: 47
Discharge: HOME | End: 2022-07-25

## 2022-07-25 PROCEDURE — 77412 RADIATION TX DELIVERY LVL 3: CPT | Performed by: RADIOLOGY

## 2022-07-26 ENCOUNTER — HOSPITAL ENCOUNTER (OUTPATIENT)
Dept: RADIATION ONCOLOGY | Facility: HOSPITAL | Age: 47
Discharge: HOME | End: 2022-07-26

## 2022-07-26 VITALS — BODY MASS INDEX: 20.19 KG/M2 | WEIGHT: 121.3 LBS

## 2022-07-26 PROCEDURE — 77412 RADIATION TX DELIVERY LVL 3: CPT | Performed by: RADIOLOGY

## 2022-07-26 PROCEDURE — 77417 THER RADIOLOGY PORT IMAGE(S): CPT | Performed by: RADIOLOGY

## 2022-07-27 ENCOUNTER — HOSPITAL ENCOUNTER (OUTPATIENT)
Dept: RADIATION ONCOLOGY | Facility: HOSPITAL | Age: 47
Discharge: HOME | End: 2022-07-27

## 2022-07-27 PROCEDURE — 77412 RADIATION TX DELIVERY LVL 3: CPT | Performed by: RADIOLOGY

## 2022-07-28 ENCOUNTER — HOSPITAL ENCOUNTER (OUTPATIENT)
Dept: RADIATION ONCOLOGY | Facility: HOSPITAL | Age: 47
Discharge: HOME | End: 2022-07-28

## 2022-07-28 PROCEDURE — 77336 RADIATION PHYSICS CONSULT: CPT | Performed by: RADIOLOGY

## 2022-07-28 PROCEDURE — 77412 RADIATION TX DELIVERY LVL 3: CPT | Performed by: RADIOLOGY

## 2022-07-29 ENCOUNTER — HOSPITAL ENCOUNTER (OUTPATIENT)
Dept: RADIATION ONCOLOGY | Facility: HOSPITAL | Age: 47
Discharge: HOME | End: 2022-07-29

## 2022-07-29 PROCEDURE — 77412 RADIATION TX DELIVERY LVL 3: CPT | Performed by: RADIOLOGY

## 2022-08-01 ENCOUNTER — HOSPITAL ENCOUNTER (OUTPATIENT)
Dept: RADIATION ONCOLOGY | Facility: HOSPITAL | Age: 47
Discharge: HOME | End: 2022-08-01

## 2022-08-01 ENCOUNTER — HOSPITAL ENCOUNTER (OUTPATIENT)
Dept: RADIATION ONCOLOGY | Facility: HOSPITAL | Age: 47
Setting detail: RADIATION/ONCOLOGY SERIES
Discharge: HOME | End: 2022-08-01

## 2022-08-01 PROCEDURE — 77412 RADIATION TX DELIVERY LVL 3: CPT | Performed by: RADIOLOGY

## 2022-08-02 ENCOUNTER — HOSPITAL ENCOUNTER (OUTPATIENT)
Dept: RADIATION ONCOLOGY | Facility: HOSPITAL | Age: 47
Discharge: HOME | End: 2022-08-02

## 2022-08-02 VITALS — BODY MASS INDEX: 19.8 KG/M2 | WEIGHT: 119 LBS

## 2022-08-02 PROCEDURE — 77417 THER RADIOLOGY PORT IMAGE(S): CPT | Performed by: RADIOLOGY

## 2022-08-02 PROCEDURE — 77412 RADIATION TX DELIVERY LVL 3: CPT | Performed by: RADIOLOGY

## 2022-08-03 ENCOUNTER — HOSPITAL ENCOUNTER (OUTPATIENT)
Dept: RADIATION ONCOLOGY | Facility: HOSPITAL | Age: 47
Discharge: HOME | End: 2022-08-03

## 2022-08-03 PROCEDURE — 77412 RADIATION TX DELIVERY LVL 3: CPT | Performed by: RADIOLOGY

## 2022-08-04 ENCOUNTER — HOSPITAL ENCOUNTER (OUTPATIENT)
Dept: RADIATION ONCOLOGY | Facility: HOSPITAL | Age: 47
Discharge: HOME | End: 2022-08-04

## 2022-08-04 PROCEDURE — 77412 RADIATION TX DELIVERY LVL 3: CPT | Performed by: RADIOLOGY

## 2022-08-04 PROCEDURE — 77336 RADIATION PHYSICS CONSULT: CPT | Performed by: RADIOLOGY

## 2022-08-05 ENCOUNTER — HOSPITAL ENCOUNTER (OUTPATIENT)
Dept: RADIATION ONCOLOGY | Facility: HOSPITAL | Age: 47
Discharge: HOME | End: 2022-08-05

## 2022-08-05 PROCEDURE — 77412 RADIATION TX DELIVERY LVL 3: CPT | Performed by: RADIOLOGY

## 2022-08-08 ENCOUNTER — HOSPITAL ENCOUNTER (OUTPATIENT)
Dept: RADIATION ONCOLOGY | Facility: HOSPITAL | Age: 47
Discharge: HOME | End: 2022-08-08

## 2022-08-08 PROCEDURE — 77412 RADIATION TX DELIVERY LVL 3: CPT | Performed by: RADIOLOGY

## 2022-08-09 ENCOUNTER — HOSPITAL ENCOUNTER (OUTPATIENT)
Dept: RADIATION ONCOLOGY | Facility: HOSPITAL | Age: 47
Discharge: HOME | End: 2022-08-09

## 2022-08-09 VITALS — WEIGHT: 121.5 LBS | BODY MASS INDEX: 20.22 KG/M2

## 2022-08-09 PROCEDURE — 77412 RADIATION TX DELIVERY LVL 3: CPT | Performed by: RADIOLOGY

## 2022-08-09 NOTE — RADIATION COMPLETION NOTES
COMPLETION NOTE    PATIENT:   Celsa Velez  :    1975  COMPLETION DATE:   22   DIAGNOSIS:   Malignant neoplasm of right breast in female, estrogen receptor positive (HCC)  - Stage IB (cT1c, cN1, cM0, G2, ER+, ME+, HER2-)  - Stage IB (pT1c, pN2a, cM0, G2, ER+, ME+, HER2-)       DrDesmond Celsa Velez  is a very pleasant 46 y.o. female found on mammogram to have a suspicious 1.3 cm irregular mass in the right 2:00 subareolar region.    She had unilateral right axillary adenopathy.   Ultrasound-guided biopsy of the breast mass was positive for invasive ductal carcinoma, histologic grade 2 with intermediate grade DCIS.  The tumor was ER/ME positive HER-2/kassie negative.  Biopsy of the right 2.2 cm axillary lymph node was positive for metastatic ductal carcinoma.  2022 she underwent bilateral mastectomies by Dr. Barahona with immediate reconstruction by Dr. Tim Brown.  He placed tissue expanders in the prepectoral position with the use of AlloDerm.  The left breast was negative.  Pathology from the right breast revealed multifocal invasive ductal adenocarcinoma, grade 2.  Tumor sizes were 1.1 cm, 0.8 cm and 0.4 cm. Margins were negative by 7 mm from the superficial margin.  5/5 lymph nodes were positive for tumor in the right axilla.  There was focal extracapsular extension of 1 mm.   She completed chemotherapy of AC and Taxol per Dr. Cummins in [place].She received radiotherapy in our department as follows:    TREATMENT COURSE:   -2022 the right chest wall and regional nodes received 45 Gray in 25 fractions with 6 MV photons  -2022 the right supraclavicular region received 45 Gray in 25 fractions with 10 MV photons    TOLERANCE:   Celsa tolerated her treatments with no difficulty.  She developed some mild irritation in the right axilla.  She continued to work throughout treatment.  She continued exercising and swimming.    DISPOSITION:  At the completion of  therapy an appointment was made for her to return on 09/13/2022 at 8:30 AM.  She knows to call if she has any problems sooner.        Roxana Martins MD    Dictated using dragon dictation

## 2022-08-12 ENCOUNTER — TELEPHONE (OUTPATIENT)
Dept: INTERNAL MEDICINE | Facility: CLINIC | Age: 47
End: 2022-08-12

## 2022-08-12 DIAGNOSIS — U07.1 COVID-19 VIRUS INFECTION: Primary | ICD-10-CM

## 2022-08-12 NOTE — TELEPHONE ENCOUNTER
Called and discussed.  Sxs started 2 days ago, tested +ve.  Fever better today, still coughing and congested.  Sent Molnupiravir as ixn between paxlovid and tamoxifen.  DM 10 ml Rx sent to help with sleep per her request.  She is agreeable to the above plan.

## 2022-08-12 NOTE — TELEPHONE ENCOUNTER
Pt states she has breast cancer and is now on tamoxifen and is causing night sweats.  States she now has COVID and taking dayquil that has dextromethorphan in it and has noticed that the night sweats have stopped. She is wanting to know if she can get a script for this?

## 2022-08-12 NOTE — TELEPHONE ENCOUNTER
PATIENT HAS CALLED REQUESTING A CALL BACK FROM THE NURSE TO DISCUSS TAKING A MEDICATION THAT HELPS HER TO SLEEP.    CALL BACK NUMBER [phone]

## 2022-09-02 ENCOUNTER — DOCUMENTATION (OUTPATIENT)
Dept: OCCUPATIONAL THERAPY | Facility: HOSPITAL | Age: 47
End: 2022-09-02

## 2022-09-02 DIAGNOSIS — C50.911 MALIGNANT NEOPLASM OF RIGHT FEMALE BREAST, UNSPECIFIED ESTROGEN RECEPTOR STATUS, UNSPECIFIED SITE OF BREAST: Primary | ICD-10-CM

## 2022-09-02 NOTE — THERAPY DISCHARGE NOTE
Outpatient Occupational Therapy Discharge Summary         Patient Name: Celsa Velez  : 1975  MRN: [id number]    Today's Date: 2022      Visit Date: 2022           OP OT Discharge Summary  Date of Discharge: 22  Discharge Instructions: Pt is being d/c from OP OT at this time as she has not been seen in our clinic for >30 days. At the time of last therapy appt she was about to begin radiation. She was ind w/ HEP and reported improved symptoms. I would be happy to see pt again in the future should she require further therapy treatment. It has been a pleasure working with Celsa.          Sherly Lomas, OTR/L   2022

## 2022-09-06 ENCOUNTER — OFFICE VISIT (OUTPATIENT)
Dept: CARDIOLOGY | Facility: CLINIC | Age: 47
End: 2022-09-06

## 2022-09-06 VITALS
SYSTOLIC BLOOD PRESSURE: 110 MMHG | HEART RATE: 82 BPM | DIASTOLIC BLOOD PRESSURE: 64 MMHG | BODY MASS INDEX: 20.19 KG/M2 | WEIGHT: 121.2 LBS | OXYGEN SATURATION: 97 % | HEIGHT: 65 IN

## 2022-09-06 DIAGNOSIS — Z17.0 MALIGNANT NEOPLASM OF RIGHT BREAST IN FEMALE, ESTROGEN RECEPTOR POSITIVE, UNSPECIFIED SITE OF BREAST: Primary | ICD-10-CM

## 2022-09-06 DIAGNOSIS — C50.911 MALIGNANT NEOPLASM OF RIGHT BREAST IN FEMALE, ESTROGEN RECEPTOR POSITIVE, UNSPECIFIED SITE OF BREAST: Primary | ICD-10-CM

## 2022-09-06 PROCEDURE — 99214 OFFICE O/P EST MOD 30 MIN: CPT | Performed by: INTERNAL MEDICINE

## 2022-09-06 NOTE — PROGRESS NOTES
[facility] Cardiology  Follow Up Visit  Celsa Velez  1975    VISIT DATE:  09/06/22    PCP:   Yecenia Giang MD  [address] [place] [id number]          CC:  Malignant neoplasm of right breast in female, estrogen rece      Problem List:  1.  Estrogen positive, progesterone positive, HER-2/kassie negative right breast cancer  -Status post bilateral mastectomy  -Status posttreatment with anthracycline and Taxol  -Ongoing right-sided radiation  -Also recommended to be on abemaciclib (verzenio)which is a 6 CDK 4/6 inhibitor cardiac effects of this can be increased risk of thromboembolism and QT prolongation. -she has not used this as of now     2.  No early family history of CAD     Echo February 2022:  · Left ventricular ejection fraction appears to be 61 - 65%. Left ventricular systolic function is normal.  · Left ventricular diastolic function was normal.  · Estimated right ventricular systolic pressure from tricuspid regurgitation is normal (<35 mmHg). Calculated right ventricular systolic pressure from tricuspid regurgitation is 21 mmHg.  · Strain cannot be calculated      History of Present Illness:  Celsa Velez  Is a 46 y.o. female with pertinent cardiac history detailed above.  Patient completed chemotherapy at [facility] in [place].  It was completed in June 2022.  She is undergoing right-sided chest radiation.  She is planned to start on tamoxifen.  She is considering Verzenio but has not committed to this yet.  BP within normal limits.  No current cardiac symptoms      Patient Active Problem List    Diagnosis Date Noted   • Malignant neoplasm of upper-inner quadrant of right female breast (HCC) 02/08/2022   • Malignant neoplasm of right breast in female, estrogen receptor positive (HCC) 12/22/2021   • Family history of breast cancer in mother 11/11/2019   • Hematuria 02/19/2018   • IBS (irritable bowel syndrome) 09/26/2016   • Vitamin D deficiency  "09/26/2016       Allergies   Allergen Reactions   • Hydrocodone Nausea And Vomiting     Pt states she is unsure if reaction was due to waking up from anesthesia or from the pain med   • Penicillins Hives       Social History     Socioeconomic History   • Marital status:    Tobacco Use   • Smoking status: Never Smoker   • Smokeless tobacco: Never Used   Vaping Use   • Vaping Use: Never used   Substance and Sexual Activity   • Alcohol use: Not Currently     Alcohol/week: 0.0 standard drinks     Comment: SOCIAL   • Drug use: No   • Sexual activity: Not Currently     Partners: Male     Birth control/protection: Surgical       Family History   Problem Relation Age of Onset   • Colon cancer Father    • Prostate cancer Father    • Arthritis Other    • Cancer Other    • Migraines Other    • Breast cancer Mother 68   • Breast cancer Paternal Grandmother 78   • Breast cancer Maternal Cousin    • Lung cancer Maternal Grandfather    • Kidney cancer Paternal Grandfather    • Ovarian cancer Neg Hx        Current Medications:    Current Outpatient Medications:   •  Dextromethorphan HBr 10 MG/5ML liquid, Take 10 mL by mouth every night at bedtime., Disp: 300 mL, Rfl: 11  •  levocetirizine (XYZAL) 5 MG tablet, Take 5 mg by mouth Every Evening., Disp: , Rfl:   •  tamoxifen (NOLVADEX) 20 MG chemo tablet, 20 mg Daily., Disp: , Rfl:      Review of Systems   Cardiovascular: Negative for chest pain, dyspnea on exertion, irregular heartbeat, leg swelling and palpitations.   Respiratory: Negative for shortness of breath.        Vitals:    09/06/22 1439   BP: 110/64   BP Location: Right arm   Patient Position: Sitting   Pulse: 82   SpO2: 97%   Weight: 55 kg (121 lb 3.2 oz)   Height: 165.1 cm (65\")       Physical Exam  HENT:      Head: Normocephalic and atraumatic.      Comments: Experienced hair loss secondary to treatment  Cardiovascular:      Rate and Rhythm: Normal rate and regular rhythm.   Pulmonary:      Effort: Pulmonary effort " is normal.      Breath sounds: Normal breath sounds.   Abdominal:      Palpations: Abdomen is soft.   Musculoskeletal:      Right lower leg: No edema.      Left lower leg: No edema.   Neurological:      General: No focal deficit present.      Mental Status: She is alert.         Diagnostic Data:  Procedures  Lab Results   Component Value Date    CHLPL 156 11/11/2019    TRIG 61 11/16/2020    HDL 76 (H) 11/16/2020     Lab Results   Component Value Date    GLUCOSE 117 (H) 02/01/2022    BUN 16 02/01/2022    CREATININE 0.77 02/01/2022     02/01/2022    K 4.2 02/01/2022     02/01/2022    CO2 24.0 02/01/2022     Lab Results   Component Value Date    HGBA1C 4.90 02/01/2022     Lab Results   Component Value Date    WBC 3.58 (L) 06/24/2022    HGB 11.4 (L) 06/24/2022    HCT 34.6 (L) 06/24/2022     06/24/2022       Assessment:   Diagnosis Plan   1. Malignant neoplasm of right breast in female, estrogen receptor positive, unspecified site of breast (HCC)  Adult Transthoracic Echo Complete W/ Cont if Necessary Per Protocol    Lipid Panel    Comprehensive Metabolic Panel       Plan:    1.  Estrogen positive, progesterone positive, HER-2/kassie negative right breast cancer  -Status post bilateral mastectomy  -Status post right-sided radiation and adjuvant chemotherapy with anthracycline and Taxol  -Also recommended to be on abemaciclib which is a 6 CDK 4/6 inhibitor cardiac effects of this can be increased risk of thromboembolism and QT prolongation.  QTC WNL at baseline, she has not started this currently, if she does start would recommend checking EKG at baseline and 2 weeks after starting  -EF is normal at 61 to 65%, no strain available  -She is now 3 months post anthracycline treatment, will schedule follow-up echocardiogram    2.  Lipid panel November 2020 LDL 65  -Aromatase inhibitor recommended by oncology  -We will obtain follow-up lipids with her next annual physical due this winter.    Follow-up 6  months    Isaias Delacruz MD FACC

## 2022-09-13 ENCOUNTER — HOSPITAL ENCOUNTER (OUTPATIENT)
Dept: RADIATION ONCOLOGY | Facility: HOSPITAL | Age: 47
Setting detail: RADIATION/ONCOLOGY SERIES
Discharge: HOME | End: 2022-09-13

## 2022-09-13 ENCOUNTER — OFFICE VISIT (OUTPATIENT)
Dept: RADIATION ONCOLOGY | Facility: HOSPITAL | Age: 47
End: 2022-09-13

## 2022-09-13 VITALS
TEMPERATURE: 96.8 F | HEIGHT: 65 IN | DIASTOLIC BLOOD PRESSURE: 56 MMHG | WEIGHT: 120.9 LBS | RESPIRATION RATE: 16 BRPM | HEART RATE: 62 BPM | BODY MASS INDEX: 20.14 KG/M2 | SYSTOLIC BLOOD PRESSURE: 108 MMHG | OXYGEN SATURATION: 98 %

## 2022-09-13 DIAGNOSIS — Z17.0 MALIGNANT NEOPLASM OF RIGHT BREAST IN FEMALE, ESTROGEN RECEPTOR POSITIVE, UNSPECIFIED SITE OF BREAST: Primary | ICD-10-CM

## 2022-09-13 DIAGNOSIS — C50.911 MALIGNANT NEOPLASM OF RIGHT BREAST IN FEMALE, ESTROGEN RECEPTOR POSITIVE, UNSPECIFIED SITE OF BREAST: Primary | ICD-10-CM

## 2022-09-13 PROCEDURE — G0463 HOSPITAL OUTPT CLINIC VISIT: HCPCS

## 2022-09-13 NOTE — PROGRESS NOTES
FOLLOW UP NOTE    PATIENT:                                                      Celsa Velez  MEDICAL RECORD #:                        [id number]  :                                                          1975  COMPLETION DATE:   2022  DIAGNOSIS:     Malignant neoplasm of right breast in female, estrogen receptor positive (HCC)  - clinical: Stage IB (cT1c, cN1, cM0, G2, ER+, AK+, HER2-)  - pathologic: Stage IB (pT1c, pN2a, cM0, G2, ER+, AK+, HER2-)      BRIEF HISTORY:  Celsa Velez is a 46 y.o. female returning for initial follow-up of her right-sided breast cancer.  She was found on mammogram to have a suspicious 1.3 cm irregular mass in the right 2:00 subareolar region.  Other nodular asymmetries were thought to be cysts and benign appearing oval masses were consistent with fibrocystic change.  She had unilateral right axillary adenopathy.  Ultrasound-guided biopsy of the breast mass was positive for invasive ductal carcinoma, histologic grade 2 with intermediate grade DCIS.  The tumor was ER/AK positive HER-2/kassie negative.  Biopsy of the right 2.2 cm axillary lymph node was positive for metastatic ductal carcinoma.    Staging PET/CT scan showed hypermetabolic right axillary lymph node and no other sites of metastatic disease.  She underwent bilateral mastectomies on 2022 by Dr. Barahona with immediate reconstruction by Dr. Tim Brown.  He placed tissue expanders in the prepectoral position with the use of AlloDerm.  The left breast was negative.  Pathology from the right breast revealed multifocal invasive ductal adenocarcinoma, grade 2.  Tumor sizes were 1.1 cm, 0.8 cm and 0.4 cm.  Margins were negative by 7 mm from the superficial margin.  5/5 lymph nodes were positive for tumor in the right axilla.  There was focal extracapsular extension of 1 mm.    She had an Oncotype recurrence risk of 16.  Genetics testing was negative.  She completed chemotherapy of dose dense AC followed  by Taxol with Dr. Cummins in [place].  She was started on adjuvant tamoxifen and does endorse some brain fog and hot flashes which she associates with the medication.  She then underwent adjuvant radiation therapy to the chest wall/implants and supraclavicular region to a dose of 45 Gray in 25 fractions.  She tolerated treatment well.  She did develop some mild irritation and lymphedema in the right axilla as well as some tightness across the right chestwall into the axilla.  She was referred to our OT lymphedema clinic.  She reports resolution of lymphedema.  She denies decreased range of motion and continues to be physically active with exercising and swimming.  She developed dry desquamation medially along the scar and within the inframammary fold on the right.  This was managed with application of vitamin E oil.  She reports occasional, intermittent right chest wall pain.  She denies dyspnea.      MEDICATIONS: Medication reconciliation for the patient was reviewed and confirmed in the electronic medical record.    Review of Systems   Constitutional: Positive for fatigue.   HENT:          Dry mouth   Endocrine: Positive for hot flashes.   Musculoskeletal: Positive for arthralgias.   Psychiatric/Behavioral: Positive for sleep disturbance.   All other systems reviewed and are negative.        KPS 90%    Physical Exam  Vitals and nursing note reviewed.   Constitutional:       General: She is not in acute distress.     Appearance: She is well-developed.   HENT:      Head: Normocephalic and atraumatic.   Eyes:      Conjunctiva/sclera: Conjunctivae normal.      Pupils: Pupils are equal, round, and reactive to light.   Cardiovascular:      Rate and Rhythm: Normal rate.   Pulmonary:      Effort: Pulmonary effort is normal.   Chest:      Comments: The breast exam reveals breasts are surgically absent bilaterally with bilateral tissue expanders.  Surgical incisions appear well-healed.  Mild residual dry desquamation on  "the right.  No suspicious lesions, masses, or axillary adenopathy on the right.  No evidence of lymphedema on the right.  No evidence of capsular contracture of expander on the right.  The left reconstructed breast and left axilla were not examined.  Musculoskeletal:         General: Normal range of motion.      Cervical back: Normal range of motion and neck supple.   Lymphadenopathy:      Cervical: No cervical adenopathy.   Skin:     General: Skin is warm and dry.   Neurological:      Mental Status: She is alert and oriented to person, place, and time.   Psychiatric:         Behavior: Behavior normal.         Thought Content: Thought content normal.         Judgment: Judgment normal.         VITAL SIGNS:   Vitals:    09/13/22 0907   BP: 108/56   Pulse: 62   Resp: 16   Temp: 96.8 °F (36 °C)   TempSrc: Temporal   SpO2: 98%   Weight: 54.8 kg (120 lb 14.4 oz)   Height: 165.1 cm (65\")   PainSc: 0-No pain             The following portions of the patient's history were reviewed and updated as appropriate: allergies, current medications, past family history, past medical history, past social history, past surgical history and problem list.         Diagnoses and all orders for this visit:    1. Malignant neoplasm of right breast in female, estrogen receptor positive, unspecified site of breast (HCC) (Primary)         IMPRESSION:  Celsa Velez is a 46 y.o. female with recent diagnosis of a stage IB (pT1c, pN2a, cM0) ER/HI positive, HER2/kassie negative IDC of the right breast.  She underwent bilateral mastectomies with placement of bilateral tissue expanders for reconstruction.  She completed adjuvant chemotherapy of AC and Taxol with Dr. Cummins.  She has begun adjuvant hormone blockade of tamoxifen, and notes consideration of switching to exemestane in December when she follows up with her medical oncologist.  For now, she has decided not to proceed with adjuvant Verzenio.  She is now 1 month status post adjuvant " radiation therapy to the right chest wall and supraclavicular region.  She tolerated treatment well.  She developed the anticipated grade 1 radiation dermatitis, and no significant acute radiation related toxicities otherwise.  We discussed local massage, range of motion exercises, and continued physical activity to minimize the risk of lymphedema and fibrosis from treatment.  She is scheduled to see her plastic surgeon later this afternoon regarding timing of additional surgery for placement of permanent implants.  We reviewed follow-up intervals and clinical examinations.  We reviewed no routine imaging unless clinically indicated.      RECOMMENDATIONS:  Celsa Velez continues routine oncologic surveillance under the care of Dr. Cummins, with follow-up scheduled 12/1/2022.  Additionally, she continues follow-up with Dr. GARCIA in the surgical oncology breast clinic.  Given the close follow-up with her other physicians, we will be happy to remain available as needed.      Return if symptoms worsen or fail to improve, for Office Visit.    DONOVAN Koroma spent a total of 30 minutes on today's visit, with more than 12 minutes in direct face to face communication, and the remainder of the time spent in reviewing the relevant history, records, available imaging, and for documentation.

## 2022-10-10 ENCOUNTER — HOSPITAL ENCOUNTER (OUTPATIENT)
Dept: CARDIOLOGY | Facility: HOSPITAL | Age: 47
Discharge: HOME | End: 2022-10-10
Admitting: INTERNAL MEDICINE

## 2022-10-10 VITALS — HEIGHT: 67 IN | WEIGHT: 120 LBS | BODY MASS INDEX: 18.83 KG/M2

## 2022-10-10 DIAGNOSIS — Z17.0 MALIGNANT NEOPLASM OF RIGHT BREAST IN FEMALE, ESTROGEN RECEPTOR POSITIVE, UNSPECIFIED SITE OF BREAST: ICD-10-CM

## 2022-10-10 DIAGNOSIS — C50.911 MALIGNANT NEOPLASM OF RIGHT BREAST IN FEMALE, ESTROGEN RECEPTOR POSITIVE, UNSPECIFIED SITE OF BREAST: ICD-10-CM

## 2022-10-10 LAB
ASCENDING AORTA: 2.8 CM
BH CV ECHO LEFT VENTRICLE GLOBAL LONGITUDINAL STRAIN: 20.1 %
BH CV ECHO MEAS - AO MAX PG: 4.7 MMHG
BH CV ECHO MEAS - AO MEAN PG: 2 MMHG
BH CV ECHO MEAS - AO ROOT AREA (BSA CORRECTED): 1.7 CM2
BH CV ECHO MEAS - AO ROOT DIAM: 2.7 CM
BH CV ECHO MEAS - AO V2 MAX: 108 CM/SEC
BH CV ECHO MEAS - AO V2 VTI: 22.3 CM
BH CV ECHO MEAS - AVA(I,D): 2.7 CM2
BH CV ECHO MEAS - EDV(CUBED): 97.3 ML
BH CV ECHO MEAS - EDV(MOD-SP2): 97.6 ML
BH CV ECHO MEAS - EDV(MOD-SP4): 77.2 ML
BH CV ECHO MEAS - EF(MOD-BP): 61 %
BH CV ECHO MEAS - EF(MOD-SP2): 62.2 %
BH CV ECHO MEAS - EF(MOD-SP4): 57.9 %
BH CV ECHO MEAS - ESV(CUBED): 24.4 ML
BH CV ECHO MEAS - ESV(MOD-SP2): 36.9 ML
BH CV ECHO MEAS - ESV(MOD-SP4): 32.5 ML
BH CV ECHO MEAS - FS: 37 %
BH CV ECHO MEAS - IVS/LVPW: 0.83 CM
BH CV ECHO MEAS - IVSD: 0.5 CM
BH CV ECHO MEAS - LA DIMENSION: 3.5 CM
BH CV ECHO MEAS - LAT PEAK E' VEL: 15.6 CM/SEC
BH CV ECHO MEAS - LV DIASTOLIC VOL/BSA (35-75): 48.5 CM2
BH CV ECHO MEAS - LV MASS(C)D: 73.7 GRAMS
BH CV ECHO MEAS - LV MAX PG: 2.9 MMHG
BH CV ECHO MEAS - LV MEAN PG: 2 MMHG
BH CV ECHO MEAS - LV SYSTOLIC VOL/BSA (12-30): 20.4 CM2
BH CV ECHO MEAS - LV V1 MAX: 85.8 CM/SEC
BH CV ECHO MEAS - LV V1 VTI: 19.5 CM
BH CV ECHO MEAS - LVIDD: 4.6 CM
BH CV ECHO MEAS - LVIDS: 2.9 CM
BH CV ECHO MEAS - LVOT AREA: 3.1 CM2
BH CV ECHO MEAS - LVOT DIAM: 2 CM
BH CV ECHO MEAS - LVPWD: 0.6 CM
BH CV ECHO MEAS - MED PEAK E' VEL: 12.9 CM/SEC
BH CV ECHO MEAS - MV A MAX VEL: 89.1 CM/SEC
BH CV ECHO MEAS - MV DEC SLOPE: 502.5 CM/SEC2
BH CV ECHO MEAS - MV DEC TIME: 0.19 MSEC
BH CV ECHO MEAS - MV E MAX VEL: 93.8 CM/SEC
BH CV ECHO MEAS - MV E/A: 1.05
BH CV ECHO MEAS - MV MAX PG: 4.1 MMHG
BH CV ECHO MEAS - MV MEAN PG: 2 MMHG
BH CV ECHO MEAS - MV P1/2T: 63.5 MSEC
BH CV ECHO MEAS - MV V2 VTI: 27 CM
BH CV ECHO MEAS - MVA(P1/2T): 3.5 CM2
BH CV ECHO MEAS - MVA(VTI): 2.27 CM2
BH CV ECHO MEAS - PA ACC TIME: 0.1 SEC
BH CV ECHO MEAS - PA PR(ACCEL): 34.7 MMHG
BH CV ECHO MEAS - PA V2 MAX: 103 CM/SEC
BH CV ECHO MEAS - RAP SYSTOLE: 3 MMHG
BH CV ECHO MEAS - RVSP: 23 MMHG
BH CV ECHO MEAS - SI(MOD-SP2): 38.1 ML/M2
BH CV ECHO MEAS - SI(MOD-SP4): 28.1 ML/M2
BH CV ECHO MEAS - SV(LVOT): 61.3 ML
BH CV ECHO MEAS - SV(MOD-SP2): 60.7 ML
BH CV ECHO MEAS - SV(MOD-SP4): 44.7 ML
BH CV ECHO MEAS - TAPSE (>1.6): 2.15 CM
BH CV ECHO MEAS - TR MAX PG: 20.4 MMHG
BH CV ECHO MEAS - TR MAX VEL: 226 CM/SEC
BH CV ECHO MEASUREMENTS AVERAGE E/E' RATIO: 6.58
BH CV VAS BP LEFT ARM: NORMAL MMHG
BH CV XLRA - RV BASE: 2.8 CM
BH CV XLRA - RV LENGTH: 6.1 CM
BH CV XLRA - RV MID: 1.9 CM
BH CV XLRA - TDI S': 12.9 CM/SEC
IVRT: 116 MSEC
LEFT ATRIUM VOLUME INDEX: 18 ML/M2
MAXIMAL PREDICTED HEART RATE: 172 BPM
STRESS TARGET HR: 146 BPM

## 2022-10-10 PROCEDURE — 93306 TTE W/DOPPLER COMPLETE: CPT | Performed by: INTERNAL MEDICINE

## 2022-10-10 PROCEDURE — 93356 MYOCRD STRAIN IMG SPCKL TRCK: CPT | Performed by: INTERNAL MEDICINE

## 2022-10-10 PROCEDURE — 93306 TTE W/DOPPLER COMPLETE: CPT

## 2022-10-10 PROCEDURE — 93356 MYOCRD STRAIN IMG SPCKL TRCK: CPT

## 2022-10-11 ENCOUNTER — TELEPHONE (OUTPATIENT)
Dept: CARDIOLOGY | Facility: CLINIC | Age: 47
End: 2022-10-11

## 2022-10-11 NOTE — TELEPHONE ENCOUNTER
Called patient regarding NSK results above. Patient verbalizes understanding.             Patient is concerned about trace tricuspid valve regurgitation and would like to know if this will improve after she finish chemotherapy.          Please advise.

## 2022-10-11 NOTE — TELEPHONE ENCOUNTER
----- Message from Isaias Delacruz MD sent at 10/10/2022  6:04 PM EDT -----  Can we let patient know her echo is reassuring.  Normal pumping function of the heart and normal strain.  No heart related side effects from chemotherapy noted.

## 2022-10-11 NOTE — TELEPHONE ENCOUNTER
Isaias Delacruz MD Hurley, Bety Mendoza, RN  Caller: Unspecified (Today,  8:59 AM)  Trace tricuspid regurgitation I would say is a common and physiologic finding.  It will not  be adversely affected by continued chemo.  She should not have to worry about this.  Overall reassuring echo   __________________________________    Called pt and gave NSK note above. Pt verbalizes understanding.

## 2022-12-05 ENCOUNTER — LAB (OUTPATIENT)
Dept: LAB | Facility: HOSPITAL | Age: 47
End: 2022-12-05

## 2022-12-05 ENCOUNTER — OFFICE VISIT (OUTPATIENT)
Dept: INTERNAL MEDICINE | Facility: CLINIC | Age: 47
End: 2022-12-05

## 2022-12-05 VITALS
BODY MASS INDEX: 19.81 KG/M2 | HEIGHT: 67 IN | OXYGEN SATURATION: 98 % | HEART RATE: 78 BPM | WEIGHT: 126.2 LBS | SYSTOLIC BLOOD PRESSURE: 100 MMHG | TEMPERATURE: 97.9 F | DIASTOLIC BLOOD PRESSURE: 70 MMHG

## 2022-12-05 DIAGNOSIS — Z12.4 SCREENING FOR CERVICAL CANCER: ICD-10-CM

## 2022-12-05 DIAGNOSIS — N32.81 OAB (OVERACTIVE BLADDER): ICD-10-CM

## 2022-12-05 DIAGNOSIS — Z00.00 ROUTINE GENERAL MEDICAL EXAMINATION AT A HEALTH CARE FACILITY: Primary | ICD-10-CM

## 2022-12-05 DIAGNOSIS — Z17.0 MALIGNANT NEOPLASM OF UPPER-INNER QUADRANT OF RIGHT BREAST IN FEMALE, ESTROGEN RECEPTOR POSITIVE: ICD-10-CM

## 2022-12-05 DIAGNOSIS — Z23 NEED FOR INFLUENZA VACCINATION: ICD-10-CM

## 2022-12-05 DIAGNOSIS — C50.211 MALIGNANT NEOPLASM OF UPPER-INNER QUADRANT OF RIGHT BREAST IN FEMALE, ESTROGEN RECEPTOR POSITIVE: ICD-10-CM

## 2022-12-05 DIAGNOSIS — E55.9 VITAMIN D DEFICIENCY: ICD-10-CM

## 2022-12-05 DIAGNOSIS — Z00.00 ROUTINE GENERAL MEDICAL EXAMINATION AT A HEALTH CARE FACILITY: ICD-10-CM

## 2022-12-05 LAB
BILIRUB BLD-MCNC: NEGATIVE MG/DL
CLARITY, POC: CLEAR
COLOR UR: YELLOW
EXPIRATION DATE: NORMAL
GLUCOSE UR STRIP-MCNC: NEGATIVE MG/DL
KETONES UR QL: NEGATIVE
LEUKOCYTE EST, POC: NEGATIVE
Lab: NORMAL
NITRITE UR-MCNC: NEGATIVE MG/ML
PH UR: 6 [PH] (ref 5–8)
PROT UR STRIP-MCNC: NEGATIVE MG/DL
RBC # UR STRIP: NEGATIVE /UL
SP GR UR: 1.03 (ref 1–1.03)
UROBILINOGEN UR QL: NORMAL

## 2022-12-05 PROCEDURE — 90471 IMMUNIZATION ADMIN: CPT | Performed by: INTERNAL MEDICINE

## 2022-12-05 PROCEDURE — 81003 URINALYSIS AUTO W/O SCOPE: CPT | Performed by: INTERNAL MEDICINE

## 2022-12-05 PROCEDURE — 90686 IIV4 VACC NO PRSV 0.5 ML IM: CPT | Performed by: INTERNAL MEDICINE

## 2022-12-05 PROCEDURE — 99396 PREV VISIT EST AGE 40-64: CPT | Performed by: INTERNAL MEDICINE

## 2022-12-05 NOTE — PROGRESS NOTES
Chief Complaint   Patient presents with   • Annual Exam   • Back Pain       History of Present Illness  HM, Adult Female:  The patient is being seen for a health maintenance evaluation.   Social history:  to David with Children, 2 sons 18 yo and 14 yo. Occupation: .  General Health: The patient's health is described as good. She has regular dental visits. She denies vision problems. She denies hearing loss. Immunizations status: up to date.   Lifestyle:. She consumes a diverse and healthy diet. She does not have any weight concerns. She exercises regularly.Swims regularly and has had Yoga sessions at work.She does not use tobacco. She denies alcohol use. She denies drug use.   Reproductive health:. She reports normal menses. S/p Tubal.  Screening: Cancer screening reviewed and current.   Metabolic screening reviewed and current.   Risk screening reviewed and current. Mom had DCIS( late 60s- 70) and , maternal grandmother had Recurrent breast cancer( came back twice)  Mom's first cousin had Breast Cancer in her 70s..     HPI  The patient reports danica COVID-19 on the last day of radiation. She notes being she was prescribed an anti-viral which was effective. She has returned to swimming, walking and working. She was taking Mucinex. She swims twice a week, about 1300 meters. She walks 4-5 days a week. Her blood pressure is low.   She reports hot flashes on the tamoxifen    She was prescribed oxybutynin, she has not started it yet. She reports stress incontinence. She reports brain fogs, primarily after 3:00 PM while at work. The patient attended pelvic floor therapy for a short period of time. She notes intermittent leakage when bending over to lift a dog, or while walking a 30 mile walk for [employer].     The patient is s/p breast cancer. She had a bilateral mastectomy. She has a family history of breast cancer, her mothers was in situ. Her paternal grandmother was diagnosed with breast cancer  twice. She is in the process of reconstructive surgery, she has the expanders in place at this time and is scheduled to have the implants put in in 02/2023 or 03/2023. She mentions the possibility of being switched to Exemestane however, she does not wish to switch medications. She is taking Tamoxifen. She had her heart evaluated, she notes some mild valvular regurgitation which she was told was age appropriate, she just had surgery and was not positioned properly. She experienced bone pain during chemotherapy. She was evaluated last week and has a follow-up on 03/02/2023.   She notes low estradiol. Her last pap smear was 10/2019.     The patient denies any new concerns. She is watching her diet and limiting red meat and adding more fish. She eats venison.     She typically receives the influenza vaccine, she notes a mild headache after receiving the influenza vaccine. She has received all the COVID-19 vaccines, except for the Bivalent booster.     She notes pain in her SI joint, the chiropractor stated it was frozen. She notes some stiffness. She has not used any muscle relaxers.     Review of Systems   Constitutional: Negative for chills and fatigue.   HENT: Negative for congestion, ear pain and sore throat.    Eyes: Negative for pain, redness and visual disturbance.   Respiratory: Negative for cough and shortness of breath.    Cardiovascular: Negative for chest pain, palpitations and leg swelling.   Gastrointestinal: Negative for abdominal pain, diarrhea and nausea.   Endocrine: Negative for cold intolerance and heat intolerance.   Genitourinary: Positive for frequency. Negative for flank pain and urgency.   Musculoskeletal: Positive for back pain. Negative for arthralgias and gait problem.   Skin: Negative for pallor and rash.   Neurological: Negative for dizziness, weakness and headaches.   Psychiatric/Behavioral: Negative for dysphoric mood and sleep disturbance. The patient is not nervous/anxious.   "      Patient Active Problem List   Diagnosis   • IBS (irritable bowel syndrome)   • Vitamin D deficiency   • Hematuria   • Family history of breast cancer in mother   • Malignant neoplasm of right breast in female, estrogen receptor positive (HCC)   • Malignant neoplasm of upper-inner quadrant of right female breast (HCC)       Social History     Socioeconomic History   • Marital status:    Tobacco Use   • Smoking status: Never   • Smokeless tobacco: Never   Vaping Use   • Vaping Use: Never used   Substance and Sexual Activity   • Alcohol use: Yes     Comment: Occasionally   • Drug use: No   • Sexual activity: Yes     Partners: Male     Birth control/protection: Surgical       Current Outpatient Medications on File Prior to Visit   Medication Sig Dispense Refill   • Dextromethorphan HBr 10 MG/5ML liquid Take 10 mL by mouth every night at bedtime. 300 mL 11   • levocetirizine (XYZAL) 5 MG tablet Take 5 mg by mouth Every Evening.     • tamoxifen (NOLVADEX) 20 MG chemo tablet 20 mg Daily.       No current facility-administered medications on file prior to visit.       Allergies   Allergen Reactions   • Hydrocodone Nausea And Vomiting     Pt states she is unsure if reaction was due to waking up from anesthesia or from the pain med   • Penicillins Hives       /70   Pulse 78   Temp 97.9 °F (36.6 °C)   Ht 170 cm (66.93\")   Wt 57.2 kg (126 lb 3.2 oz)   SpO2 98% Comment: ra  BMI 19.81 kg/m²            The following portions of the patient's history were reviewed and updated as appropriate: allergies, current medications, past family history, past medical history, past social history, past surgical history and problem list.    Physical Exam  Constitutional:       General: She is not in acute distress.     Appearance: Normal appearance.   HENT:      Head: Normocephalic and atraumatic.      Right Ear: Tympanic membrane and external ear normal.      Left Ear: Tympanic membrane and external ear normal.      " Nose: Nose normal.      Mouth/Throat:      Mouth: Mucous membranes are moist.   Eyes:      General: No scleral icterus.  Neck:      Vascular: No carotid bruit.   Cardiovascular:      Rate and Rhythm: Normal rate and regular rhythm.      Pulses: Normal pulses.      Heart sounds: Normal heart sounds. No murmur heard.    No friction rub. No gallop.   Pulmonary:      Effort: Pulmonary effort is normal.      Breath sounds: Normal breath sounds. No rhonchi or rales.   Abdominal:      General: Bowel sounds are normal. There is no distension.      Palpations: Abdomen is soft.      Tenderness: There is no right CVA tenderness, left CVA tenderness, guarding or rebound.      Hernia: No hernia is present. There is no hernia in the left inguinal area or right inguinal area.   Genitourinary:     General: Normal vulva.      Exam position: Lithotomy position.      Pubic Area: No rash.       Labia:         Right: No lesion.         Left: No lesion.       Urethra: No prolapse, urethral pain or urethral swelling.      Vagina: No vaginal discharge or tenderness.      Cervix: No cervical motion tenderness, discharge or cervical bleeding.      Uterus: Normal. Not enlarged and not tender.       Adnexa: Right adnexa normal and left adnexa normal.      Rectum: No tenderness, external hemorrhoid or internal hemorrhoid.   Musculoskeletal:         General: Tenderness present. Normal range of motion.      Cervical back: Normal range of motion.      Right lower leg: No edema.      Left lower leg: No edema.   Lymphadenopathy:      Cervical: No cervical adenopathy.      Lower Body: No right inguinal adenopathy. No left inguinal adenopathy.   Skin:     General: Skin is warm.      Findings: No rash.   Neurological:      General: No focal deficit present.      Mental Status: She is alert and oriented to person, place, and time. Mental status is at baseline.      Cranial Nerves: No cranial nerve deficit.      Sensory: No sensory deficit.       Coordination: Coordination normal.      Gait: Gait normal.      Deep Tendon Reflexes: Reflexes normal.   Psychiatric:         Mood and Affect: Mood normal.         Behavior: Behavior normal.         Results for orders placed or performed in visit on 12/05/22   POCT urinalysis dipstick, automated    Specimen: Urine   Result Value Ref Range    Color Yellow Yellow, Straw, Dark Yellow, Clarissa    Clarity, UA Clear Clear    Specific Gravity  1.030 1.005 - 1.030    pH, Urine 6.0 5.0 - 8.0    Leukocytes Negative Negative    Nitrite, UA Negative Negative    Protein, POC Negative Negative mg/dL    Glucose, UA Negative Negative mg/dL    Ketones, UA Negative Negative    Urobilinogen, UA Normal Normal, 0.2 E.U./dL    Bilirubin Negative Negative    Blood, UA Negative Negative    Lot Number [id number]     Expiration Date 03/25/24        Diagnoses and all orders for this visit:    1. Routine general medical examination at a health care facility (Primary)  -     POCT urinalysis dipstick, automated  -     CBC (No Diff); Future  -     Comprehensive Metabolic Panel; Future  -     Lipid Panel; Future  -     TSH; Future  -     Hemoglobin A1c; Future    2. OAB (overactive bladder)  -     Vibegron 75 MG tablet; Take 1 tablet by mouth Daily.  Dispense: 21 tablet; Refill: 0    3. Malignant neoplasm of upper-inner quadrant of right breast in female, estrogen receptor positive (HCC)  Comments:  on Tamoxifen, followed by [facility]  Orders:  -     Vitamin D,25-Hydroxy; Future    4. Screening for cervical cancer  -     LIQUID-BASED PAP SMEAR, [facility] ([facility]); Future  -     LIQUID-BASED PAP SMEAR, [facility] ([facility])    5. Need for influenza vaccination  -     FluLaval/Fluarix/Fluzone >6 Months    6. Vitamin D deficiency  -     Vitamin D,25-Hydroxy; Future    1. Overactive bladder  - Will provide the patient with samples of Gemtesa.   - Recommend pelvic floor therapy.   2. Health maintenance  - Order bone density scan   - Recommend  influenza vaccine.   - Administer influenza vaccine today.   - Perform pap smear today.   3. Back pain  - Recommend muscle relaxer as needed.   - Alternate ice and heat.   4. History of breast cancer  - Continue Tamoxifen as prescribed.   - Continue follow-up in 03/2023.       Health Maintenance   Topic Date Due   • Pneumococcal Vaccine 0-64 (1 - PCV) Never done   • PAP SMEAR  10/14/2022   • COVID-19 Vaccine (3 - Moderna risk series) 12/24/2022 (Originally 3/19/2021)   • ANNUAL PHYSICAL  12/06/2023   • COLORECTAL CANCER SCREENING  02/16/2026   • TDAP/TD VACCINES (2 - Td or Tdap) 10/03/2026   • HEPATITIS C SCREENING  Completed   • INFLUENZA VACCINE  Completed       Discussion/Summary  Impression: health maintenance visit. Currently, she eats an adequate diet and has an adequate exercise regimen.   Cervical cancer screening:Pap smear is current.sent today.   Breast cancer screening: mammogram is current.   Colorectal cancer screening: colonoscopy is current.  Osteoporosis screening: Bone mineral density test is due- ordered.   CT low dose screen - not indicated  Screening lab work includes hemoglobin, glucose, lipid profile, thyroid function testing, 25-hydroxyvitamin D and urinalysis.   Immunizations are needed, immunizations will be given as outlined in the orders   Advice and education were given regarding cardiovascular risk reduction, healthy dietary habits, Seatbelt and helmet use and self skin examination.     Return in about 1 year (around 12/5/2023) for Annual.      Electronically signed by:    Yecenia Giang MD     Transcribed from ambient dictation for Yecenia Giang MD by Marilyn Menchaca.  12/05/22   12:38 EST    Patient or patient representative verbalized consent to the visit recording.  I have personally performed the services described in this document as transcribed by the above individual, and it is both accurate and complete.  Yecenia Giang MD  12/6/2022  22:16 EST

## 2022-12-06 LAB
25(OH)D3+25(OH)D2 SERPL-MCNC: 30.7 NG/ML (ref 30–100)
ALBUMIN SERPL-MCNC: 4.5 G/DL (ref 3.5–5.2)
ALBUMIN/GLOB SERPL: 1.9 G/DL
ALP SERPL-CCNC: 67 U/L (ref 39–117)
ALT SERPL-CCNC: 13 U/L (ref 1–33)
AST SERPL-CCNC: 20 U/L (ref 1–32)
BILIRUB SERPL-MCNC: <0.2 MG/DL (ref 0–1.2)
BUN SERPL-MCNC: 19 MG/DL (ref 6–20)
BUN/CREAT SERPL: 27.5 (ref 7–25)
CALCIUM SERPL-MCNC: 9.2 MG/DL (ref 8.6–10.5)
CHLORIDE SERPL-SCNC: 105 MMOL/L (ref 98–107)
CHOLEST SERPL-MCNC: 180 MG/DL (ref 0–200)
CO2 SERPL-SCNC: 23.8 MMOL/L (ref 22–29)
CREAT SERPL-MCNC: 0.69 MG/DL (ref 0.57–1)
EGFRCR SERPLBLD CKD-EPI 2021: 107.2 ML/MIN/1.73
ERYTHROCYTE [DISTWIDTH] IN BLOOD BY AUTOMATED COUNT: 11.9 % (ref 12.3–15.4)
GLOBULIN SER CALC-MCNC: 2.4 GM/DL
GLUCOSE SERPL-MCNC: 84 MG/DL (ref 65–99)
HBA1C MFR BLD: 4.8 % (ref 4.8–5.6)
HCT VFR BLD AUTO: 39.3 % (ref 34–46.6)
HDLC SERPL-MCNC: 102 MG/DL (ref 40–60)
HGB BLD-MCNC: 13.2 G/DL (ref 12–15.9)
LDLC SERPL CALC-MCNC: 64 MG/DL (ref 0–100)
MCH RBC QN AUTO: 30.6 PG (ref 26.6–33)
MCHC RBC AUTO-ENTMCNC: 33.6 G/DL (ref 31.5–35.7)
MCV RBC AUTO: 91 FL (ref 79–97)
PLATELET # BLD AUTO: 182 10*3/MM3 (ref 140–450)
POTASSIUM SERPL-SCNC: 4.5 MMOL/L (ref 3.5–5.2)
PROT SERPL-MCNC: 6.9 G/DL (ref 6–8.5)
RBC # BLD AUTO: 4.32 10*6/MM3 (ref 3.77–5.28)
SODIUM SERPL-SCNC: 140 MMOL/L (ref 136–145)
TRIGL SERPL-MCNC: 76 MG/DL (ref 0–150)
TSH SERPL DL<=0.005 MIU/L-ACNC: 1.57 UIU/ML (ref 0.27–4.2)
VLDLC SERPL CALC-MCNC: 14 MG/DL (ref 5–40)
WBC # BLD AUTO: 3.97 10*3/MM3 (ref 3.4–10.8)

## 2022-12-08 LAB — REF LAB TEST METHOD: NORMAL

## 2022-12-14 ENCOUNTER — TELEPHONE (OUTPATIENT)
Dept: INTERNAL MEDICINE | Facility: CLINIC | Age: 47
End: 2022-12-14

## 2022-12-14 NOTE — TELEPHONE ENCOUNTER
----- Message from Yecenia Giang MD sent at 12/14/2022 12:31 AM EST -----  Please let patient know that her Pap smear shows that it has an epithelial cell abnormality but the HPV is negative.  Therefore this is not significant but because of her recent history, I would recommend she have a repeat Pap in 6 months.  Please have her schedule an appointment for repeat Pap .

## 2023-08-03 ENCOUNTER — PATIENT OUTREACH (OUTPATIENT)
Dept: ONCOLOGY | Facility: CLINIC | Age: 48
End: 2023-08-03
Payer: COMMERCIAL

## 2023-08-04 ENCOUNTER — TRANSCRIBE ORDERS (OUTPATIENT)
Dept: OCCUPATIONAL THERAPY | Facility: HOSPITAL | Age: 48
End: 2023-08-04
Payer: COMMERCIAL

## 2023-08-04 ENCOUNTER — TRANSCRIBE ORDERS (OUTPATIENT)
Dept: PHYSICAL THERAPY | Facility: HOSPITAL | Age: 48
End: 2023-08-04
Payer: COMMERCIAL

## 2023-08-04 DIAGNOSIS — C50.211 MALIGNANT NEOPLASM OF UPPER-INNER QUADRANT OF RIGHT FEMALE BREAST, UNSPECIFIED ESTROGEN RECEPTOR STATUS: Primary | ICD-10-CM

## 2023-08-07 ENCOUNTER — TELEPHONE (OUTPATIENT)
Dept: CARDIOLOGY | Facility: CLINIC | Age: 48
End: 2023-08-07

## 2023-08-07 ENCOUNTER — OFFICE VISIT (OUTPATIENT)
Dept: INTERNAL MEDICINE | Facility: CLINIC | Age: 48
End: 2023-08-07
Payer: COMMERCIAL

## 2023-08-07 ENCOUNTER — HOSPITAL ENCOUNTER (OUTPATIENT)
Dept: CARDIOLOGY | Facility: HOSPITAL | Age: 48
Discharge: HOME | End: 2023-08-07
Admitting: INTERNAL MEDICINE
Payer: COMMERCIAL

## 2023-08-07 VITALS — BODY MASS INDEX: 22.04 KG/M2 | HEIGHT: 65 IN | WEIGHT: 132.28 LBS

## 2023-08-07 VITALS
BODY MASS INDEX: 22.06 KG/M2 | DIASTOLIC BLOOD PRESSURE: 60 MMHG | WEIGHT: 132.4 LBS | OXYGEN SATURATION: 98 % | HEIGHT: 65 IN | HEART RATE: 78 BPM | TEMPERATURE: 97.5 F | SYSTOLIC BLOOD PRESSURE: 102 MMHG

## 2023-08-07 DIAGNOSIS — R87.610 ATYPICAL SQUAMOUS CELLS OF UNDETERMINED SIGNIFICANCE ON CYTOLOGIC SMEAR OF CERVIX (ASC-US): Primary | ICD-10-CM

## 2023-08-07 DIAGNOSIS — N32.81 OAB (OVERACTIVE BLADDER): ICD-10-CM

## 2023-08-07 DIAGNOSIS — Z17.0 MALIGNANT NEOPLASM OF RIGHT BREAST IN FEMALE, ESTROGEN RECEPTOR POSITIVE, UNSPECIFIED SITE OF BREAST: ICD-10-CM

## 2023-08-07 DIAGNOSIS — Z12.4 SCREENING FOR CERVICAL CANCER: ICD-10-CM

## 2023-08-07 DIAGNOSIS — C50.911 MALIGNANT NEOPLASM OF RIGHT BREAST IN FEMALE, ESTROGEN RECEPTOR POSITIVE, UNSPECIFIED SITE OF BREAST: ICD-10-CM

## 2023-08-07 LAB
BH CV ECHO LEFT VENTRICLE GLOBAL LONGITUDINAL STRAIN: -18.6 %
BH CV ECHO MEAS - AO ROOT DIAM: 2.5 CM
BH CV ECHO MEAS - EDV(CUBED): 68.9 ML
BH CV ECHO MEAS - EDV(MOD-SP2): 26 ML
BH CV ECHO MEAS - EDV(MOD-SP4): 40 ML
BH CV ECHO MEAS - EF(MOD-BP): 62 %
BH CV ECHO MEAS - EF(MOD-SP2): 53.8 %
BH CV ECHO MEAS - EF(MOD-SP4): 65 %
BH CV ECHO MEAS - ESV(CUBED): 22.2 ML
BH CV ECHO MEAS - ESV(MOD-SP2): 12 ML
BH CV ECHO MEAS - ESV(MOD-SP4): 14 ML
BH CV ECHO MEAS - FS: 31.5 %
BH CV ECHO MEAS - IVS/LVPW: 0.94 CM
BH CV ECHO MEAS - IVSD: 0.85 CM
BH CV ECHO MEAS - LA DIMENSION: 3.3 CM
BH CV ECHO MEAS - LV MASS(C)D: 109.7 GRAMS
BH CV ECHO MEAS - LVIDD: 4.1 CM
BH CV ECHO MEAS - LVIDS: 2.8 CM
BH CV ECHO MEAS - LVOT AREA: 3.5 CM2
BH CV ECHO MEAS - LVOT DIAM: 2.1 CM
BH CV ECHO MEAS - LVPWD: 0.9 CM
BH CV ECHO MEAS - SV(MOD-SP2): 14 ML
BH CV ECHO MEAS - SV(MOD-SP4): 26 ML

## 2023-08-07 PROCEDURE — 93356 MYOCRD STRAIN IMG SPCKL TRCK: CPT | Performed by: INTERNAL MEDICINE

## 2023-08-07 PROCEDURE — 93308 TTE F-UP OR LMTD: CPT

## 2023-08-07 PROCEDURE — 93308 TTE F-UP OR LMTD: CPT | Performed by: INTERNAL MEDICINE

## 2023-08-07 PROCEDURE — 93356 MYOCRD STRAIN IMG SPCKL TRCK: CPT

## 2023-08-07 NOTE — TELEPHONE ENCOUNTER
----- Message from Isaias Delacruz MD sent at 8/7/2023  3:09 PM EDT -----  Patient's echo shows normal pumping function of the heart  Normal history and values.  No evidence of chemotherapy effects on the heart.

## 2023-08-07 NOTE — PROGRESS NOTES
"Gynecologic Exam    Subjective   Celsa Velez is a 47 y.o. female is here today for follow-up.    History of Present Illness  Here for a repeat eval of her abnl pap. No discomfort, spotting or vaginal d/c.  She is on the Emsella treatment for her incontinence and it has resolved    Current Outpatient Medications:     Ascorbic Acid (Vitamin C) 500 MG chewable tablet, Chew 2 each., Disp: , Rfl:     Cholecalciferol (VITAMIN D-3 PO), Take 2,000 each by mouth., Disp: , Rfl:     levocetirizine (XYZAL) 5 MG tablet, Take 1 tablet by mouth Every Evening., Disp: , Rfl:     tamoxifen (NOLVADEX) 20 MG chemo tablet, 1 tablet Daily., Disp: , Rfl:       The following portions of the patient's history were reviewed and updated as appropriate: allergies, current medications, past family history, past medical history, past social history, past surgical history and problem list.    Review of Systems   Constitutional: Negative.  Negative for chills and fever.   HENT:  Negative for ear discharge, ear pain, sinus pressure and sore throat.    Respiratory:  Negative for cough, chest tightness and shortness of breath.    Cardiovascular:  Negative for chest pain, palpitations and leg swelling.   Gastrointestinal:  Negative for diarrhea, nausea and vomiting.   Musculoskeletal:  Negative for arthralgias, back pain and myalgias.   Neurological:  Negative for dizziness, syncope and headaches.   Psychiatric/Behavioral:  Negative for confusion and sleep disturbance.      Objective   /60   Pulse 78   Temp 97.5 øF (36.4 øC)   Ht 165 cm (64.96\")   Wt 60.1 kg (132 lb 6.4 oz)   LMP 01/19/2022 (Exact Date)   SpO2 98% Comment: ra  BMI 22.06 kg/mý   Physical Exam  Constitutional:       Appearance: She is well-developed.   HENT:      Head: Normocephalic and atraumatic.   Eyes:      General: No scleral icterus.     Pupils: Pupils are equal, round, and reactive to light.   Pulmonary:      Effort: Pulmonary effort is normal. No " respiratory distress.   Abdominal:      Hernia: There is no hernia in the left inguinal area or right inguinal area.   Genitourinary:     General: Normal vulva.      Exam position: Lithotomy position.      Pubic Area: No rash.       Labia:         Right: No lesion.         Left: No lesion.       Urethra: No prolapse, urethral pain or urethral swelling.      Vagina: No vaginal discharge or tenderness.      Cervix: No cervical motion tenderness, discharge or cervical bleeding.      Uterus: Normal. Not enlarged and not tender.       Adnexa: Right adnexa normal and left adnexa normal.      Rectum: No tenderness, external hemorrhoid or internal hemorrhoid.   Musculoskeletal:      Cervical back: Normal range of motion and neck supple.   Lymphadenopathy:      Lower Body: No right inguinal adenopathy. No left inguinal adenopathy.   Neurological:      Mental Status: She is alert and oriented to person, place, and time.   Psychiatric:         Judgment: Judgment normal.         Results for orders placed or performed during the hospital encounter of 08/07/23   Adult Transthoracic Echo Limited W/ Cont if Necessary Per Protocol   Result Value Ref Range    EF(MOD-bp) 62.0 %    LVIDd 4.1 cm    LVIDs 2.8 cm    IVSd 0.85 cm    LVPWd 0.90 cm    FS 31.5 %    IVS/LVPW 0.94 cm    ESV(cubed) 22.2 ml    EDV(cubed) 68.9 ml    LVOT area 3.5 cm2    LV mass(C)d 109.7 grams    LVOT diam 2.10 cm    EDV(MOD-sp2) 26.0 ml    EDV(MOD-sp4) 40.0 ml    ESV(MOD-sp2) 12.0 ml    ESV(MOD-sp4) 14.0 ml    SV(MOD-sp2) 14.0 ml    SV(MOD-sp4) 26.0 ml    EF(MOD-sp2) 53.8 %    EF(MOD-sp4) 65.0 %    LA dimension (2D)  3.3 cm    Ao root diam 2.5 cm    LV GLOBAL STRAIN  -18.6 %             Assessment & Plan   Diagnoses and all orders for this visit:    Atypical squamous cells of undetermined significance on cytologic smear of cervix (ASC-US)  -     LIQUID-BASED PAP SMEAR WITH HPV GENOTYPING REGARDLESS OF INTERPRETATION ([facility]); Future  -     LIQUID-BASED PAP  SMEAR WITH HPV GENOTYPING REGARDLESS OF INTERPRETATION ([facility])    OAB (overactive bladder)  Comments:  resolved on emsella    Other orders  -     Ascorbic Acid (Vitamin C) 500 MG chewable tablet; Chew 2 each.  -     Cholecalciferol (VITAMIN D-3 PO); Take 2,000 each by mouth.       if abnl or non diagnostic.           Return for Next scheduled follow up.    Electronically signed by:    Yecenia Giang MD Answers submitted by the patient for this visit:  Other (Submitted on 7/31/2023)  Please describe your symptoms.: Recheck Pap smear  Have you had these symptoms before?: No  How long have you been having these symptoms?: 1-4 days  Please list any medications you are currently taking for this condition.: None  Please describe any probable cause for these symptoms. : No symptoms or abnormal discharge.  Primary Reason for Visit (Submitted on 7/31/2023)  What is the primary reason for your visit?: Other

## 2023-08-10 LAB — REF LAB TEST METHOD: NORMAL

## 2023-08-28 ENCOUNTER — HOSPITAL ENCOUNTER (OUTPATIENT)
Dept: OCCUPATIONAL THERAPY | Facility: HOSPITAL | Age: 48
Setting detail: THERAPIES SERIES
Discharge: HOME | End: 2023-08-28
Payer: COMMERCIAL

## 2023-08-28 DIAGNOSIS — C50.911 MALIGNANT NEOPLASM OF RIGHT FEMALE BREAST, UNSPECIFIED ESTROGEN RECEPTOR STATUS, UNSPECIFIED SITE OF BREAST: Primary | ICD-10-CM

## 2023-08-28 DIAGNOSIS — M25.60 RANGE OF MOTION DEFICIT: ICD-10-CM

## 2023-08-28 DIAGNOSIS — L90.5 ADHERENT SCAR, SKIN: ICD-10-CM

## 2023-08-28 PROCEDURE — 97140 MANUAL THERAPY 1/> REGIONS: CPT

## 2023-08-28 PROCEDURE — 97166 OT EVAL MOD COMPLEX 45 MIN: CPT

## 2023-08-28 NOTE — THERAPY EVALUATION
Outpatient Occupational Therapy Ortho Initial Evaluation   [facility]     Patient Name: Celsa Velez  : 1975  MRN: [id number]  Today's Date: 2023      Visit Date: 2023    Patient Active Problem List   Diagnosis    IBS (irritable bowel syndrome)    Vitamin D deficiency    Hematuria    Family history of breast cancer in mother    Malignant neoplasm of right breast in female, estrogen receptor positive    Malignant neoplasm of upper-inner quadrant of right female breast        Past Medical History:   Diagnosis Date    Acute sinusitis     Allergic     Arthritis     Headache     Just sporadic    History of radiation therapy 2022    right chestwall/supraclav    JRA (juvenile rheumatoid arthritis)     Malignant neoplasm of right breast in female, estrogen receptor positive 2021    Wild Breast Mastectomies on 22    Seizure disorder     Possibly puberty/hormonal related; has not had a seizure since her teens    UTI (urinary tract infection)     Last Impression: 2014  Send urine for culture. Treat with cipro until results are  available.  Almaz Cavazos (Internal Medicine)        Past Surgical History:   Procedure Laterality Date    BREAST BIOPSY Right     us bx    BREAST RECONSTRUCTION, BREAST TISSUE EXPANDER INSERTION Bilateral 2022    Procedure: IMMEDIATE BILATERAL BREAST RECONSTRUCTION WITH TISSUE EXPANDER;  Surgeon: Tim Brown MD;  Location:  [facility];  Service: Plastics;  Laterality: Bilateral;    COLONOSCOPY      DILATION AND CURETTAGE, DIAGNOSTIC / THERAPEUTIC      Dilation And Curettage Of Cervical Stump    KNEE SURGERY      LYMPH NODE BIOPSY  Right axillary BC positive 5 nodes    MASTECTOMY      MASTECTOMY W/ SENTINEL NODE BIOPSY N/A 2022    Procedure: BREAST MASTECTOMY WITH RIGHT SENTINEL NODE BIOPSY, LEFT PROPHYLATIC SKIN SPARRING MASTECTOMY;  Surgeon: Claudia Barahona MD;  Location:  [facility];  Service: General;  Laterality: N/A;    US  GUIDED LYMPH NODE BIOPSY  12/13/2021         Visit Dx:    ICD-10-CM ICD-9-CM   1. Malignant neoplasm of right female breast, unspecified estrogen receptor status, unspecified site of breast  C50.911 174.9   2. Adherent scar, skin  L90.5 709.2   3. 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 female found on mammogram to have a suspicious 1.3 cm irregular mass in the right 2:00 subareolar region.  Other nodular asymmetries were thought to be cysts and benign appearing oval masses were consistent with fibrocystic change.  She had unilateral right axillary adenopathy.   Ultrasound-guided biopsy of the breast mass was positive for invasive ductal carcinoma, histologic grade 2 with intermediate grade DCIS.  The tumor was ER/NM positive HER-2/kassie negative.  Biopsy of the right 2.2 cm axillary lymph node was positive for metastatic ductal carcinoma.  2/8/2022 she underwent bilateral mastectomies by Dr. Barahona with immediate reconstruction by Dr. Tim Brown.  He placed tissue expanders in the prepectoral position with the use of AlloDerm.  The left breast was negative.  Pathology from the right breast revealed multifocal invasive ductal adenocarcinoma, grade 2.  Tumor sizes were 1.1 cm, 0.8 cm and 0.4 cm. Margins were negative by 7 mm from the superficial margin.  5/5 lymph nodes were positive for tumor in the right axilla.  There was focal extracapsular extension of 1 mm. She had chemotherapy at [facility] in [place] (4 AC followed by 4 Taxol). Following, she had adjuvant radiotherapy.  She then had exchange surgery from expanders to implants approx 6 months following radiation.  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  She enjoys swimming, pilates, and does 1x week weight training for exercise.  She is having  pain/restriction in left shoulder and side body feels tight. She is experiencing what feels like tremor in left hand when driving and in sustained resting position. Left pain where port was placed w/ end point flexion (pain radiates up to neck) (notices nayeli w/ backstroke).  -SG      Patient/Caregiver Goals Relieve pain;Return to prior level of function;Improve mobility;Improve strength;Know what to do to help the symptoms  -SG      Hand Dominance right-handed  -SG      Occupation/sports/leisure activities Pt works as ; swimming, pilates, weight training  -SG         Pain     Pain Location Shoulder;Chest  -SG      Pain at Present 0  -SG      Pain at Best 0  -SG      Pain at Worst 6  -SG      Pain Description Tightness;Sharp;Discomfort  -SG      Difficulties with recreational activities? Yes  -SG         Daily Activities    Pt Participated in POC and Goals Yes  -SG                User Key  (r) = Recorded By, (t) = Taken By, (c) = Cosigned By      Initials Name Provider Type    Sherly Rosales OTR/L Occupational Therapist                           Lymphedema       Row Name 08/28/23 0900             Subjective Pain    Able to rate subjective pain? yes  -SG      Pre-Treatment Pain Level 0  -SG      Post-Treatment Pain Level 0  -SG         Lymphedema Assessment    Lymphedema Classification RUE:;at risk/stage 0  -SG      Lymphedema Cancer Related Sx right;axillary dissection;bilateral;simple mastectomy;reconstructive  -SG      Lymphedema Surgery Comments 2/8/2022  -SG      Lymph Nodes Removed # 5  -SG      Positive Lymph Nodes # 5  -SG      Chemo Received yes  -SG      Radiation Therapy Received yes  -SG      Infections or Cellulitis? no  -SG         Posture/Observations    Posture- WNL Posture is WNL  -SG         General ROM    GENERAL ROM COMMENTS BUE WFL--pt reports pain/restriction w/ forward reach on R side and end point flexion on left side  -SG         MMT (Manual Muscle Testing)    General MMT  Comments Pt participates in strength training 1 x week in addition to swimming and pilates.  -SG         Lymphedema Edema Assessment    Edema Assessment Comment Pt does not demo any edema at this time  -SG         Skin Changes/Observations    Location/Assessment Upper Extremity;Upper Quadrant  -SG      Upper Extremity Conditions bilateral:;normal  -SG      Upper Extremity Color/Pigment bilateral:;normal  -SG      Skin Observations Comment Tight soft tissue restrictions/scar tissue bilateral chest  -SG         Lymphedema Sensation    Lymphedema Sensation Comments Pt reports tingling in hands and feet  -SG         Manual Lymphatic Drainage    Manual Lymphatic Drainage astym  -SG      Astym upper traps;neck;UE sequence  -SG      Neck right:  -SG      Upper Traps right  -SG      UE Sequence right  -SG      Manual Therapy See manual rx for PORi protocol  -SG         L-Dex Bioimpedence Screening    L-Dex Measurement Extremity RUE  -SG      L-Dex Patient Position Standing  -SG      L-Dex UE Dominate Side Right  -SG      L-Dex UE At Risk Side Right  -SG      L-Dex UE Pre Surgical Value No  -SG      L-Dex UE Baseline Score -2.4  -SG      Skeletal Muscle Mass (%) 29.7 %  -SG      Fat Mass (%) 30.5 %  -SG      Hy-dex -12.4  -SG                User Key  (r) = Recorded By, (t) = Taken By, (c) = Cosigned By      Initials Name Provider Type    Sherly Rosales OTR/L Occupational Therapist                      Therapy Education  Education Details: We discussed pt's current symptoms; encouraged to continue w/ the exercise that she is doing in conjunction w/ started therapy again. Edu on goals, POC, and schedule.  Given: HEP, Symptoms/condition management, Posture/body mechanics  Program: New, Reinforced  How Provided: Verbal  Provided to: Patient  Level of Understanding: Verbalized     OT Goals       Row Name 08/28/23 0900          OT Short Term Goals    STG Date to Achieve 09/27/23  -SG     STG 1  Pt will be independent with HEP  for shoulder ROM and soft tissue flexibility.  -SG     STG 2  Bilateral chest  and shoulder restrictions will demo at least 50% improvement.  -SG        Long Term Goals    LTG 1  Pt will restore shoulder AROM to PLOF without pain to improve 1 and 2 handed functional activity ability  -SG     LTG 2  Pt will restore functional scoring using Quick DASH assessment tool to pre-operative amounts (per pt report, 0) to ensure full return to baseline function.  -SG     LTG 3  Pt will restore full upright posture per pt perception to promote greater functional movement.  -SG     LTG 4  Pt will demonstrate awareness of individualized lymphedema precautions based on personal risk factors and when to seek medical attn. for assessment of early signs of lymphedema or cellulitis of the affected region  -SG        Time Calculation    OT Goal Re-Cert Due Date 11/26/23  -SG               User Key  (r) = Recorded By, (t) = Taken By, (c) = Cosigned By      Initials Name Provider Type    Sherly Rosales, OTR/L Occupational Therapist                     OT Assessment/Plan       Row Name 08/28/23 0900          OT Assessment    Functional Limitations Limitations in functional capacity and performance;Performance in self-care ADL  -SG     Impairments Range of motion;Posture;Pain;Muscle strength;Joint mobility;Impaired flexibility  -SG     Assessment Comments Pt presents w the following: pain and stiffness and restriction in chest and shoulders which limits ROM for ADLs/IADLs and recreational activities; disrupted scapulo-humeral rhythm; increased tissue tightness over chest, stress across pectoralis major muscle, and associated ST through shoulder and trunk; w/ tight soft tissue restrictions and scar tissue causing tightness in chest/trunk esepcially on R side; at risk for lymphedema in RUE; tingling/tremor in R hand. She will benefit from continuing with OP OT to address symptoms and promote full return to PLOF  -SG     OT Diagnosis BrCA   -SG     OT Rehab Potential Excellent  -SG     Patient/caregiver participated in establishment of treatment plan and goals Yes  -SG     Patient would benefit from skilled therapy intervention Yes  -SG        OT Plan    OT Frequency 1x/week  -     Planned CPT's? OT EVAL MOD COMPLEXITY: 87246;OT THER ACT EA 15 MIN: 51825ME;OT THER PROC EA 15 MIN: [id number];OT SELF CARE/MGMT/TRAIN 15 MIN: 30945;OT MANUAL THERAPY EA 15 MIN: 24526;OT BIS XTRACELL FLUID ANALYSIS: 70777  -     Planned Therapy Interventions (Optional Details) home exercise program;joint mobilization;manual therapy techniques;patient/family education;postural re-education;ROM (Range of Motion);strengthening;stretching  -     OT Plan Comments Continue PORi protcol w/ ASTYM/STM as needed; progress ROM and HEP as tolerated.  -               User Key  (r) = Recorded By, (t) = Taken By, (c) = Cosigned By      Initials Name Provider Type    Sherly Roasles OTR/L Occupational Therapist                       Manual Rx (last 36 hours)       Manual Treatments       Row Name 08/28/23 0900             Total Minutes    84748 - OT Manual Therapy Minutes 20  -         Manual Rx 1    Manual Rx 1 Location Arm--Orthopedic: Forearm extensors, biceps muscles, lateral intermuscular septum tension line upper arm, pectoralis major  -      Manual Rx 1 Type Trigger Point Release, Myofascial Bending  -      Manual Rx 1 Duration Trigger Point Release for count of 3 seconds, repeat each section 5 times; Myofascial stretching w/ tissue/muscle bending 3-5 times  -         Manual Rx 2    Manual Rx 2 Location Axilla--Orthopedic: Medial Intermuscular Septum tension line upper arm, pectoralis minor, subscapularis, latissimus dorsi, teres major  -      Manual Rx 2 Type Trigger Point Release; Myofascial bending  -      Manual Rx 2 Duration Trigger Point Release for count of 3 seconds, repeat each section 5 times; Myofascial stretching w/ tissue/muscle bending 3-5 times  -          Manual Rx 4    Manual Rx 4 Location Forearm--Orthopedic: radius and ulna-interosseous membrane; carpals-joint capsules-metacarpals-interosseous membranes; thenar muscles  -      Manual Rx 4 Type Joint mobilization, Trigger Point Release, Drainage of forearm  -SG      Manual Rx 4 Duration Joint Mob: each section x5 in each position; drainage of forearm: perform x1 lines of treatment  -         Manual Rx 5    Manual Rx 5 Location Back--orthopedic: upper trapezius, infraspinatus, teres minor, rhomboids, quadratus lumborum; T12, L1, L2 mobilization  -SG      Manual Rx 5 Type Trigger Point Release, Joint mobilization  -SG      Manual Rx 5 Duration Trigger Point Release for count of 3 seconds, repeat each section 5 times; perform 5-10 oscillations of each vertebra successively  -                User Key  (r) = Recorded By, (t) = Taken By, (c) = Cosigned By      Initials Name Provider Type    Sherly Rosales OTR/L Occupational Therapist                    Outcome Measure Options: Quick DASH  Quick DASH  Open a tight or new jar.: Mild Difficulty  Do heavy household chores (e.g., wash walls, wash floors): Mild Difficulty  Carry a shopping bag or briefcase: Mild Difficulty  Wash your back: Mild Difficulty  Use a knife to cut food: No Difficulty  Recreational activities in which you take some force or impact through your arm, should or hand (e.g. golf, hammering, tennis, etc.): Mild Difficulty  During the past week, to what extent has your arm, shoulder, or hand problem interfered with your normal social activites with family, friends, neighbors or groups?: Slightly  During the past week, were you limited in your work or other regular daily activities as a result of your arm, shoulder or hand problem?: Slightly Limited  Arm, Shoulder, or hand pain: None  Tingling (pins and needles) in your arm, shoulder, or hand: Mild  During the past week, how much difficulty have you had sleeping because of the pain in your  arm, shoulder or hand?: No difficulty  Number of Questions Answered: 11  Quick DASH Score: 18.18         Time Calculation:    OT Start Time: 0900  Timed Charges  16701 - OT Manual Therapy Minutes: 20  Total Minutes  Timed Charges Total Minutes: 20   Total Minutes: 20     Therapy Charges for Today       Code Description Service Date Service Provider Modifiers Qty    [id number]  OT MANUAL THERAPY EA 15 MIN 8/28/2023 Sherly Lomas OTR/L GO 1    [id number]  OT EVAL MOD COMPLEXITY 4 8/28/2023 Sherly Lomas OTR/L GO 1                    Sherly Lomas OTR/RAMÓN  8/28/2023

## 2023-09-07 ENCOUNTER — HOSPITAL ENCOUNTER (OUTPATIENT)
Dept: OCCUPATIONAL THERAPY | Facility: HOSPITAL | Age: 48
Setting detail: THERAPIES SERIES
Discharge: HOME | End: 2023-09-07
Payer: COMMERCIAL

## 2023-09-07 DIAGNOSIS — L90.5 ADHERENT SCAR, SKIN: ICD-10-CM

## 2023-09-07 DIAGNOSIS — M25.60 RANGE OF MOTION DEFICIT: ICD-10-CM

## 2023-09-07 DIAGNOSIS — C50.911 MALIGNANT NEOPLASM OF RIGHT FEMALE BREAST, UNSPECIFIED ESTROGEN RECEPTOR STATUS, UNSPECIFIED SITE OF BREAST: Primary | ICD-10-CM

## 2023-09-07 PROCEDURE — 97140 MANUAL THERAPY 1/> REGIONS: CPT

## 2023-09-07 NOTE — THERAPY TREATMENT NOTE
Outpatient Occupational Therapy Ortho Treatment Note   Tristen     Patient Name: Celsa Velez  : 1975  MRN: [id number]  Today's Date: 2023        Visit Date: 2023    Patient Active Problem List   Diagnosis    IBS (irritable bowel syndrome)    Vitamin D deficiency    Hematuria    Family history of breast cancer in mother    Malignant neoplasm of right breast in female, estrogen receptor positive    Malignant neoplasm of upper-inner quadrant of right female breast        Past Medical History:   Diagnosis Date    Acute sinusitis     Allergic     Arthritis     Headache     Just sporadic    History of radiation therapy 2022    right chestwall/supraclav    JRA (juvenile rheumatoid arthritis)     Malignant neoplasm of right breast in female, estrogen receptor positive 2021    Wild Breast Mastectomies on 22    Seizure disorder     Possibly puberty/hormonal related; has not had a seizure since her teens    UTI (urinary tract infection)     Last Impression: 2014  Send urine for culture. Treat with cipro until results are  available.  Almaz Cavazos (Internal Medicine)        Past Surgical History:   Procedure Laterality Date    BREAST BIOPSY Right     us bx    BREAST RECONSTRUCTION, BREAST TISSUE EXPANDER INSERTION Bilateral 2022    Procedure: IMMEDIATE BILATERAL BREAST RECONSTRUCTION WITH TISSUE EXPANDER;  Surgeon: Tim Brown MD;  Location: [facility] OR;  Service: Plastics;  Laterality: Bilateral;    COLONOSCOPY      DILATION AND CURETTAGE, DIAGNOSTIC / THERAPEUTIC      Dilation And Curettage Of Cervical Stump    KNEE SURGERY      LYMPH NODE BIOPSY  Right axillary BC positive 5 nodes    MASTECTOMY      MASTECTOMY W/ SENTINEL NODE BIOPSY N/A 2022    Procedure: BREAST MASTECTOMY WITH RIGHT SENTINEL NODE BIOPSY, LEFT PROPHYLATIC SKIN SPARRING MASTECTOMY;  Surgeon: Claudia Barahona MD;  Location:  [facility] OR;  Service: General;  Laterality: N/A;    US  GUIDED LYMPH NODE BIOPSY  12/13/2021         Visit Dx:    ICD-10-CM ICD-9-CM   1. Malignant neoplasm of right female breast, unspecified estrogen receptor status, unspecified site of breast  C50.911 174.9   2. Adherent scar, skin  L90.5 709.2   3. Range of motion deficit  M25.60 719.50              Lymphedema       Row Name 09/07/23 1400             Subjective Pain    Able to rate subjective pain? yes  -SG      Pre-Treatment Pain Level 0  -SG      Post-Treatment Pain Level 0  -SG         Subjective Comments    Subjective Comments Pt reports that she is feeling better, less tight. She was able to do butterfly in the pool today better than before.  -SG         Lymphedema Assessment    Lymphedema Classification RUE:;at risk/stage 0  -SG      Lymphedema Cancer Related Sx right;axillary dissection;bilateral;simple mastectomy;reconstructive  -SG      Lymphedema Surgery Comments 2/8/2022  -SG      Lymph Nodes Removed # 5  -SG      Positive Lymph Nodes # 5  -SG      Chemo Received yes  -SG      Radiation Therapy Received yes  -SG      Infections or Cellulitis? no  -SG         Posture/Observations    Posture- WNL Posture is WNL  -SG         General ROM    GENERAL ROM COMMENTS BUE WFL--pt reports pain/restriction w/ forward reach on R side and end point flexion on left side  -SG         MMT (Manual Muscle Testing)    General MMT Comments Pt participates in strength training 1 x week in addition to swimming and pilates.  -SG         Lymphedema Edema Assessment    Edema Assessment Comment Pt does not demo any edema at this time  -SG         Skin Changes/Observations    Location/Assessment Upper Extremity;Upper Quadrant  -SG      Upper Extremity Conditions bilateral:;normal  -SG      Upper Extremity Color/Pigment bilateral:;normal  -SG      Upper Quadrant Skin Details Tight soft tissue restrictions/scar tissue bilateral chest  -SG         Lymphedema Sensation    Lymphedema Sensation Comments Pt reports tingling in hands and feet   -SG         Manual Lymphatic Drainage    Manual Lymphatic Drainage astym  -SG      Astym upper traps;neck;UE sequence  -SG      Neck right:  -SG      Upper Traps right  -SG      UE Sequence right  -SG      Manual Therapy See manual rx for PORi protocol  -SG         L-Dex Bioimpedence Screening    L-Dex Measurement Extremity RUE  -SG      L-Dex Patient Position Standing  -SG      L-Dex UE Dominate Side Right  -SG      L-Dex UE At Risk Side Right  -SG      L-Dex UE Pre Surgical Value No  -SG      L-Dex UE Baseline Score -2.4  -SG      Skeletal Muscle Mass (%) 29.7 %  -SG      Fat Mass (%) 30.5 %  -SG      Hy-dex -12.4  -SG                User Key  (r) = Recorded By, (t) = Taken By, (c) = Cosigned By      Initials Name Provider Type    Sherly Rosales, OTR/L Occupational Therapist                      Therapy Education  Education Details: Continue HEP  Given: HEP, Symptoms/condition management, Posture/body mechanics  Program: New, Reinforced  How Provided: Verbal  Provided to: Patient  Level of Understanding: Verbalized     OT Assessment/Plan       Row Name 09/07/23 1400          OT Assessment    Functional Limitations Limitations in functional capacity and performance;Performance in self-care ADL  -SG     Impairments Range of motion;Posture;Pain;Muscle strength;Joint mobility;Impaired flexibility  -SG     Assessment Comments Pt presents w the following: pain and stiffness and restriction in chest and shoulders which limits ROM for ADLs/IADLs and recreational activities; disrupted scapulo-humeral rhythm; increased tissue tightness over chest, stress across pectoralis major muscle, and associated ST through shoulder and trunk; w/ tight soft tissue restrictions and scar tissue causing tightness in chest/trunk esepcially on R side; at risk for lymphedema in RUE; tingling/tremor in R hand. She will benefit from continuing with OP OT to address symptoms and promote full return to PLOF  -SG     OT Diagnosis BrCA  -SG      OT Rehab Potential Excellent  -SG     Patient/caregiver participated in establishment of treatment plan and goals Yes  -SG     Patient would benefit from skilled therapy intervention Yes  -SG        OT Plan    OT Frequency 1x/week  -SG     Planned CPT's? OT EVAL MOD COMPLEXITY: 90791;OT THER ACT EA 15 MIN: 20117ZD;OT THER PROC EA 15 MIN: 11160MN;OT SELF CARE/MGMT/TRAIN 15 MIN: 84077;OT MANUAL THERAPY EA 15 MIN: 74316;OT BIS XTRACELL FLUID ANALYSIS: 56888  -     Planned Therapy Interventions (Optional Details) home exercise program;joint mobilization;manual therapy techniques;patient/family education;postural re-education;ROM (Range of Motion);strengthening;stretching  -     OT Plan Comments Continue PORi protcol w/ ASTYM/STM as needed; progress ROM and HEP as tolerated.  -               User Key  (r) = Recorded By, (t) = Taken By, (c) = Cosigned By      Initials Name Provider Type    Sherly Rosales OTR/L Occupational Therapist                             Manual Rx (last 36 hours)       Manual Treatments       Row Name 09/07/23 1400             Total Minutes    80568 - OT Manual Therapy Minutes 53  -         Manual Rx 1    Manual Rx 1 Location Arm--Orthopedic: Forearm extensors, biceps muscles, lateral intermuscular septum tension line upper arm, pectoralis major  -      Manual Rx 1 Type Trigger Point Release, Myofascial Bending  -      Manual Rx 1 Duration Trigger Point Release for count of 3 seconds, repeat each section 5 times; Myofascial stretching w/ tissue/muscle bending 3-5 times  -         Manual Rx 2    Manual Rx 2 Location Axilla--Orthopedic: Medial Intermuscular Septum tension line upper arm, pectoralis minor, subscapularis, latissimus dorsi, teres major  -      Manual Rx 2 Type Trigger Point Release; Myofascial bending  -      Manual Rx 2 Duration Trigger Point Release for count of 3 seconds, repeat each section 5 times; Myofascial stretching w/ tissue/muscle bending 3-5 times  -          Manual Rx 3    Manual Rx 3 Location Upper Arm: Lymph System  -      Manual Rx 3 Type Upper arm fluid clearance of axillary pathway; medial confluence fluid clearance--antecubital fossa; lateral upper arm-cephalic pathway  -      Manual Rx 3 Duration x1 line of treatment  -         Manual Rx 4    Manual Rx 4 Location Forearm--Orthopedic: radius and ulna-interosseous membrane; carpals-joint capsules-metacarpals-interosseous membranes; thenar muscles  -      Manual Rx 4 Type Joint mobilization, Trigger Point Release, Drainage of forearm  -SG      Manual Rx 4 Duration Joint Mob: each section x5 in each position; drainage of forearm: perform x1 lines of treatment  -         Manual Rx 5    Manual Rx 5 Location Back--orthopedic: upper trapezius, infraspinatus, teres minor, rhomboids, quadratus lumborum; T12, L1, L2 mobilization  -      Manual Rx 5 Type Trigger Point Release, Joint mobilization  -      Manual Rx 5 Duration Trigger Point Release for count of 3 seconds, repeat each section 5 times; perform 5-10 oscillations of each vertebra successively  -         Manual Rx 6    Manual Rx 6 Location Back--lymph system  -SG      Manual Rx 6 Type Fluid movement; paraspinal lymph node pumps  -      Manual Rx 6 Duration Repeat each line x1 and corresponding paraspinal node pumps x5; repeat each zone x1  -SG                User Key  (r) = Recorded By, (t) = Taken By, (c) = Cosigned By      Initials Name Provider Type    Sherly Rosales OTR/L Occupational Therapist                                Time Calculation:   OT Start Time: 1400  Timed Charges  19382 - OT Manual Therapy Minutes: 53  Total Minutes  Timed Charges Total Minutes: 53   Total Minutes: 53     Therapy Charges for Today       Code Description Service Date Service Provider Modifiers Qty    [id number]  OT MANUAL THERAPY EA 15 MIN 9/7/2023 Sherly Lomas OTR/L GO 4                      ALMA Singh/RAMÓN  9/7/2023

## 2023-09-18 ENCOUNTER — HOSPITAL ENCOUNTER (OUTPATIENT)
Dept: OCCUPATIONAL THERAPY | Facility: HOSPITAL | Age: 48
Setting detail: THERAPIES SERIES
Discharge: HOME | End: 2023-09-18
Payer: COMMERCIAL

## 2023-09-18 DIAGNOSIS — C50.911 MALIGNANT NEOPLASM OF RIGHT FEMALE BREAST, UNSPECIFIED ESTROGEN RECEPTOR STATUS, UNSPECIFIED SITE OF BREAST: Primary | ICD-10-CM

## 2023-09-18 DIAGNOSIS — M25.60 RANGE OF MOTION DEFICIT: ICD-10-CM

## 2023-09-18 DIAGNOSIS — L90.5 ADHERENT SCAR, SKIN: ICD-10-CM

## 2023-09-18 PROCEDURE — 97140 MANUAL THERAPY 1/> REGIONS: CPT

## 2023-09-18 NOTE — THERAPY TREATMENT NOTE
Outpatient Occupational Therapy Ortho Treatment Note   Tristen     Patient Name: Celsa Velez  : 1975  MRN: [id number]  Today's Date: 2023        Visit Date: 2023    Patient Active Problem List   Diagnosis    IBS (irritable bowel syndrome)    Vitamin D deficiency    Hematuria    Family history of breast cancer in mother    Malignant neoplasm of right breast in female, estrogen receptor positive    Malignant neoplasm of upper-inner quadrant of right female breast        Past Medical History:   Diagnosis Date    Acute sinusitis     Allergic     Arthritis     Headache     Just sporadic    History of radiation therapy 2022    right chestwall/supraclav    JRA (juvenile rheumatoid arthritis)     Malignant neoplasm of right breast in female, estrogen receptor positive 2021    Wild Breast Mastectomies on 22    Seizure disorder     Possibly puberty/hormonal related; has not had a seizure since her teens    UTI (urinary tract infection)     Last Impression: 2014  Send urine for culture. Treat with cipro until results are  available.  Almaz Cavazos (Internal Medicine)        Past Surgical History:   Procedure Laterality Date    BREAST BIOPSY Right     us bx    BREAST RECONSTRUCTION, BREAST TISSUE EXPANDER INSERTION Bilateral 2022    Procedure: IMMEDIATE BILATERAL BREAST RECONSTRUCTION WITH TISSUE EXPANDER;  Surgeon: Tim Brown MD;  Location: [facility] OR;  Service: Plastics;  Laterality: Bilateral;    COLONOSCOPY      DILATION AND CURETTAGE, DIAGNOSTIC / THERAPEUTIC      Dilation And Curettage Of Cervical Stump    KNEE SURGERY      LYMPH NODE BIOPSY  Right axillary BC positive 5 nodes    MASTECTOMY      MASTECTOMY W/ SENTINEL NODE BIOPSY N/A 2022    Procedure: BREAST MASTECTOMY WITH RIGHT SENTINEL NODE BIOPSY, LEFT PROPHYLATIC SKIN SPARRING MASTECTOMY;  Surgeon: Claudia Barahona MD;  Location:  [facility] OR;  Service: General;  Laterality: N/A;    US  GUIDED LYMPH NODE BIOPSY  12/13/2021         Visit Dx:    ICD-10-CM ICD-9-CM   1. Malignant neoplasm of right female breast, unspecified estrogen receptor status, unspecified site of breast  C50.911 174.9   2. Adherent scar, skin  L90.5 709.2   3. Range of motion deficit  M25.60 719.50              Lymphedema       Row Name 09/18/23 1300             Subjective Pain    Able to rate subjective pain? yes  -SG      Pre-Treatment Pain Level 0  -SG      Post-Treatment Pain Level 0  -SG         Subjective    Subjective Comments Pt reports that she noticed a little more stiffness in RUE since we had to miss session last week. Otherwise, she has been exercising and also did acupuncture this morning.  -SG         Lymphedema Assessment    Lymphedema Classification RUE:;at risk/stage 0  -SG      Lymphedema Cancer Related Sx right;axillary dissection;bilateral;simple mastectomy;reconstructive  -SG      Lymphedema Surgery Comments 2/8/2022  -SG      Lymph Nodes Removed # 5  -SG      Positive Lymph Nodes # 5  -SG      Chemo Received yes  -SG      Radiation Therapy Received yes  -SG      Infections or Cellulitis? no  -SG         Posture/Observations    Posture- WNL Posture is WNL  -SG         General ROM    GENERAL ROM COMMENTS BUE WFL--pt reports pain/restriction w/ forward reach on R side and end point flexion on left side  -SG         MMT (Manual Muscle Testing)    General MMT Comments Pt participates in strength training 1 x week in addition to swimming and pilates.  -SG         Lymphedema Edema Assessment    Edema Assessment Comment Pt does not demo any edema at this time  -SG         Skin Changes/Observations    Location/Assessment Upper Extremity;Upper Quadrant  -SG      Upper Extremity Conditions bilateral:;normal  -SG      Upper Extremity Color/Pigment bilateral:;normal  -SG      Skin Observations Comment Tight soft tissue restrictions/scar tissue bilateral chest  -SG         Lymphedema Sensation    Lymphedema Sensation  Comments Pt reports tingling in hands and feet  -SG         Manual Lymphatic Drainage    Manual Lymphatic Drainage astym  -SG      Astym UE sequence;anterior-lateral chest  -SG      Anterior-Lateral Chest right  -SG      UE Sequence right  -SG      Manual Lymphatic Drainage Comments astym to left chest where port used to be  -SG      Manual Therapy See manual rx for PORi protocol  -SG         L-Dex Bioimpedence Screening    L-Dex Measurement Extremity RUE  -SG      L-Dex Patient Position Standing  -SG      L-Dex UE Dominate Side Right  -SG      L-Dex UE At Risk Side Right  -SG      L-Dex UE Pre Surgical Value No  -SG      L-Dex UE Baseline Score -2.4  -SG      Skeletal Muscle Mass (%) 29.7 %  -SG      Fat Mass (%) 30.5 %  -SG      Hy-dex -12.4  -SG                User Key  (r) = Recorded By, (t) = Taken By, (c) = Cosigned By      Initials Name Provider Type    Sherly Rosales OTR/L Occupational Therapist                      Therapy Education  Education Details: Continue HEP  Given: HEP, Symptoms/condition management, Posture/body mechanics  Program: Reinforced  How Provided: Verbal  Provided to: Patient  Level of Understanding: Verbalized     OT Assessment/Plan       Row Name 09/18/23 1300          OT Assessment    Functional Limitations Limitations in functional capacity and performance;Performance in self-care ADL  -SG     Impairments Range of motion;Posture;Pain;Muscle strength;Joint mobility;Impaired flexibility  -SG     OT Diagnosis BrCA  -SG     OT Rehab Potential Excellent  -SG     Patient/caregiver participated in establishment of treatment plan and goals Yes  -SG     Patient would benefit from skilled therapy intervention Yes  -SG        OT Plan    OT Frequency 1x/week  -SG     Planned CPT's? OT EVAL MOD COMPLEXITY: 50783;OT THER ACT EA 15 MIN: 66516OH;OT THER PROC EA 15 MIN: 44405FT;OT SELF CARE/MGMT/TRAIN 15 MIN: 37603;OT MANUAL THERAPY EA 15 MIN: 13507;OT BIS XTRACELL FLUID ANALYSIS: 73968  -SG      Planned Therapy Interventions (Optional Details) home exercise program;joint mobilization;manual therapy techniques;patient/family education;postural re-education;ROM (Range of Motion);strengthening;stretching  -     OT Plan Comments Continue PORi protcol w/ ASTYM/STM as needed; progress ROM and HEP as tolerated.  -               User Key  (r) = Recorded By, (t) = Taken By, (c) = Cosigned By      Initials Name Provider Type    Sherly Rosales OTR/L Occupational Therapist                             Manual Rx (last 36 hours)       Manual Treatments       Row Name 09/18/23 1300             Total Minutes    85144 - OT Manual Therapy Minutes 53  -         Manual Rx 1    Manual Rx 1 Location Arm--Orthopedic: Forearm extensors, biceps muscles, lateral intermuscular septum tension line upper arm, pectoralis major  -      Manual Rx 1 Type Trigger Point Release, Myofascial Bending  -      Manual Rx 1 Duration Trigger Point Release for count of 3 seconds, repeat each section 5 times; Myofascial stretching w/ tissue/muscle bending 3-5 times  -         Manual Rx 2    Manual Rx 2 Location Axilla--Orthopedic: Medial Intermuscular Septum tension line upper arm, pectoralis minor, subscapularis, latissimus dorsi, teres major  -      Manual Rx 2 Type Trigger Point Release; Myofascial bending  -      Manual Rx 2 Duration Trigger Point Release for count of 3 seconds, repeat each section 5 times; Myofascial stretching w/ tissue/muscle bending 3-5 times  -         Manual Rx 3    Manual Rx 3 Location Upper Arm: Lymph System  -      Manual Rx 3 Type Upper arm fluid clearance of axillary pathway; medial confluence fluid clearance--antecubital fossa; lateral upper arm-cephalic pathway  -      Manual Rx 3 Duration x1 line of treatment  -         Manual Rx 4    Manual Rx 4 Location Forearm--Orthopedic: radius and ulna-interosseous membrane; carpals-joint capsules-metacarpals-interosseous membranes; thenar muscles  -       Manual Rx 4 Type Joint mobilization, Trigger Point Release, Drainage of forearm  -      Manual Rx 4 Duration Joint Mob: each section x5 in each position; drainage of forearm: perform x1 lines of treatment  -         Manual Rx 5    Manual Rx 5 Location Back--orthopedic: upper trapezius, infraspinatus, teres minor, rhomboids, quadratus lumborum; T12, L1, L2 mobilization  -      Manual Rx 5 Type Trigger Point Release, Joint mobilization  -      Manual Rx 5 Duration Trigger Point Release for count of 3 seconds, repeat each section 5 times; perform 5-10 oscillations of each vertebra successively  -         Manual Rx 6    Manual Rx 6 Location Back--lymph system  -      Manual Rx 6 Type Fluid movement; paraspinal lymph node pumps  -      Manual Rx 6 Duration Repeat each line x1 and corresponding paraspinal node pumps x5; repeat each zone x1  -SG                User Key  (r) = Recorded By, (t) = Taken By, (c) = Cosigned By      Initials Name Provider Type    Sherly Rosales OTR/L Occupational Therapist                                Time Calculation:   OT Start Time: 1300  Timed Charges  38195 - OT Manual Therapy Minutes: 53  Total Minutes  Timed Charges Total Minutes: 53   Total Minutes: 53     Therapy Charges for Today       Code Description Service Date Service Provider Modifiers Qty    [id number]  OT MANUAL THERAPY EA 15 MIN 9/18/2023 Sherly Lomas OTR/L GO 4                      ALMA Singh/RAMÓN  9/18/2023

## 2023-09-25 ENCOUNTER — HOSPITAL ENCOUNTER (OUTPATIENT)
Dept: OCCUPATIONAL THERAPY | Facility: HOSPITAL | Age: 48
Setting detail: THERAPIES SERIES
Discharge: HOME | End: 2023-09-25
Payer: COMMERCIAL

## 2023-09-25 DIAGNOSIS — L90.5 ADHERENT SCAR, SKIN: ICD-10-CM

## 2023-09-25 DIAGNOSIS — M25.60 RANGE OF MOTION DEFICIT: ICD-10-CM

## 2023-09-25 DIAGNOSIS — C50.911 MALIGNANT NEOPLASM OF RIGHT FEMALE BREAST, UNSPECIFIED ESTROGEN RECEPTOR STATUS, UNSPECIFIED SITE OF BREAST: Primary | ICD-10-CM

## 2023-09-25 PROCEDURE — 97140 MANUAL THERAPY 1/> REGIONS: CPT

## 2023-09-25 NOTE — THERAPY TREATMENT NOTE
Outpatient Occupational Therapy Ortho Treatment Note   Tristen     Patient Name: Celsa Velez  : 1975  MRN: [id number]  Today's Date: 2023        Visit Date: 2023    Patient Active Problem List   Diagnosis    IBS (irritable bowel syndrome)    Vitamin D deficiency    Hematuria    Family history of breast cancer in mother    Malignant neoplasm of right breast in female, estrogen receptor positive    Malignant neoplasm of upper-inner quadrant of right female breast        Past Medical History:   Diagnosis Date    Acute sinusitis     Allergic     Arthritis     Headache     Just sporadic    History of radiation therapy 2022    right chestwall/supraclav    JRA (juvenile rheumatoid arthritis)     Malignant neoplasm of right breast in female, estrogen receptor positive 2021    Wild Breast Mastectomies on 22    Seizure disorder     Possibly puberty/hormonal related; has not had a seizure since her teens    UTI (urinary tract infection)     Last Impression: 2014  Send urine for culture. Treat with cipro until results are  available.  Almaz Cavazos (Internal Medicine)        Past Surgical History:   Procedure Laterality Date    BREAST BIOPSY Right     us bx    BREAST RECONSTRUCTION, BREAST TISSUE EXPANDER INSERTION Bilateral 2022    Procedure: IMMEDIATE BILATERAL BREAST RECONSTRUCTION WITH TISSUE EXPANDER;  Surgeon: Tim Brown MD;  Location: [facility] OR;  Service: Plastics;  Laterality: Bilateral;    COLONOSCOPY      DILATION AND CURETTAGE, DIAGNOSTIC / THERAPEUTIC      Dilation And Curettage Of Cervical Stump    KNEE SURGERY      LYMPH NODE BIOPSY  Right axillary BC positive 5 nodes    MASTECTOMY      MASTECTOMY W/ SENTINEL NODE BIOPSY N/A 2022    Procedure: BREAST MASTECTOMY WITH RIGHT SENTINEL NODE BIOPSY, LEFT PROPHYLATIC SKIN SPARRING MASTECTOMY;  Surgeon: Claudia Barahona MD;  Location:  [facility];  Service: General;  Laterality: N/A;    US  GUIDED LYMPH NODE BIOPSY  12/13/2021         Visit Dx:    ICD-10-CM ICD-9-CM   1. Malignant neoplasm of right female breast, unspecified estrogen receptor status, unspecified site of breast  C50.911 174.9   2. Adherent scar, skin  L90.5 709.2   3. Range of motion deficit  M25.60 719.50              Lymphedema       Row Name 09/25/23 1300             Subjective Pain    Able to rate subjective pain? yes  -SG      Pre-Treatment Pain Level 0  -SG      Post-Treatment Pain Level 0  -SG         Subjective    Subjective Comments Pt reports that she is doing well, tired but no new pain.  -SG         Lymphedema Assessment    Lymphedema Classification RUE:;at risk/stage 0  -SG      Lymphedema Cancer Related Sx right;axillary dissection;bilateral;simple mastectomy;reconstructive  -SG      Lymphedema Surgery Comments 2/8/2022  -SG      Lymph Nodes Removed # 5  -SG      Positive Lymph Nodes # 5  -SG      Chemo Received yes  -SG      Radiation Therapy Received yes  -SG      Infections or Cellulitis? no  -SG         Posture/Observations    Posture- WNL Posture is WNL  -SG         General ROM    GENERAL ROM COMMENTS BUE WFL--pt reports pain/restriction w/ forward reach on R side and end point flexion on left side  -SG         MMT (Manual Muscle Testing)    General MMT Comments Pt participates in strength training 1 x week in addition to swimming and pilates.  -SG         Lymphedema Edema Assessment    Edema Assessment Comment Pt does not demo any edema at this time  -SG         Skin Changes/Observations    Location/Assessment Upper Extremity;Upper Quadrant  -SG      Upper Extremity Conditions bilateral:;normal  -SG      Upper Extremity Color/Pigment bilateral:;normal  -SG      Skin Observations Comment Tight soft tissue restrictions/scar tissue bilateral chest  -SG         Lymphedema Sensation    Lymphedema Sensation Comments Pt reports tingling in hands and feet  -SG         Manual Lymphatic Drainage    Manual Lymphatic Drainage  astym  -SG      Astym UE sequence;anterior-lateral chest  -SG      Anterior-Lateral Chest right  -SG      UE Sequence right  -SG      Manual Lymphatic Drainage Comments astym to left chest where port used to be  -SG      Manual Therapy See manual rx for PORi protocol  -SG         L-Dex Bioimpedence Screening    L-Dex Measurement Extremity RUE  -SG      L-Dex Patient Position Standing  -SG      L-Dex UE Dominate Side Right  -SG      L-Dex UE At Risk Side Right  -SG      L-Dex UE Pre Surgical Value No  -SG      L-Dex UE Baseline Score -2.4  -SG      Skeletal Muscle Mass (%) 29.7 %  -SG      Fat Mass (%) 30.5 %  -SG      Hy-dex -12.4  -SG                User Key  (r) = Recorded By, (t) = Taken By, (c) = Cosigned By      Initials Name Provider Type    Sherly Rosales OTR/L Occupational Therapist                      Therapy Education  Education Details: Continue HEP  Given: HEP, Symptoms/condition management, Posture/body mechanics  Program: Reinforced  How Provided: Verbal  Provided to: Patient  Level of Understanding: Verbalized     OT Assessment/Plan       Row Name 09/25/23 1300          OT Assessment    Functional Limitations Limitations in functional capacity and performance;Performance in self-care ADL  -SG     Impairments Range of motion;Posture;Pain;Muscle strength;Joint mobility;Impaired flexibility  -SG     OT Diagnosis BrCA  -SG     OT Rehab Potential Excellent  -SG     Patient/caregiver participated in establishment of treatment plan and goals Yes  -SG     Patient would benefit from skilled therapy intervention Yes  -SG        OT Plan    OT Frequency 1x/week  -SG     Planned CPT's? OT EVAL MOD COMPLEXITY: 25875;OT THER ACT EA 15 MIN: 07371NI;OT THER PROC EA 15 MIN: 28857FB;OT SELF CARE/MGMT/TRAIN 15 MIN: 28262;OT MANUAL THERAPY EA 15 MIN: 33471;OT BIS XTRACELL FLUID ANALYSIS: 71134  -SG     Planned Therapy Interventions (Optional Details) home exercise program;joint mobilization;manual therapy  techniques;patient/family education;postural re-education;ROM (Range of Motion);strengthening;stretching  -     OT Plan Comments Continue PORi protcol w/ ASTYM/STM as needed; progress ROM and HEP as tolerated.  -               User Key  (r) = Recorded By, (t) = Taken By, (c) = Cosigned By      Initials Name Provider Type    Sherly Rosales, OTR/L Occupational Therapist                             Manual Rx (last 36 hours)       Manual Treatments       Row Name 09/25/23 1300             Total Minutes    45441 - OT Manual Therapy Minutes 53  -         Manual Rx 1    Manual Rx 1 Location Arm--Orthopedic: Forearm extensors, biceps muscles, lateral intermuscular septum tension line upper arm, pectoralis major  -      Manual Rx 1 Type Trigger Point Release, Myofascial Bending  -      Manual Rx 1 Duration Trigger Point Release for count of 3 seconds, repeat each section 5 times; Myofascial stretching w/ tissue/muscle bending 3-5 times  -         Manual Rx 2    Manual Rx 2 Location Axilla--Orthopedic: Medial Intermuscular Septum tension line upper arm, pectoralis minor, subscapularis, latissimus dorsi, teres major  -      Manual Rx 2 Type Trigger Point Release; Myofascial bending  -      Manual Rx 2 Duration Trigger Point Release for count of 3 seconds, repeat each section 5 times; Myofascial stretching w/ tissue/muscle bending 3-5 times  -         Manual Rx 3    Manual Rx 3 Location Upper Arm: Lymph System  -      Manual Rx 3 Type Upper arm fluid clearance of axillary pathway; medial confluence fluid clearance--antecubital fossa; lateral upper arm-cephalic pathway  -      Manual Rx 3 Duration x1 line of treatment  -         Manual Rx 4    Manual Rx 4 Location Forearm--Orthopedic: radius and ulna-interosseous membrane; carpals-joint capsules-metacarpals-interosseous membranes; thenar muscles  -      Manual Rx 4 Type Joint mobilization, Trigger Point Release, Drainage of forearm  -      Manual Rx  4 Duration Joint Mob: each section x5 in each position; drainage of forearm: perform x1 lines of treatment  -         Manual Rx 5    Manual Rx 5 Location Back--orthopedic: upper trapezius, infraspinatus, teres minor, rhomboids, quadratus lumborum; T12, L1, L2 mobilization  -      Manual Rx 5 Type Trigger Point Release, Joint mobilization  -      Manual Rx 5 Duration Trigger Point Release for count of 3 seconds, repeat each section 5 times; perform 5-10 oscillations of each vertebra successively  -         Manual Rx 6    Manual Rx 6 Location Back--lymph system  -      Manual Rx 6 Type Fluid movement; paraspinal lymph node pumps  -      Manual Rx 6 Duration Repeat each line x1 and corresponding paraspinal node pumps x5; repeat each zone x1  -SG                User Key  (r) = Recorded By, (t) = Taken By, (c) = Cosigned By      Initials Name Provider Type    Sherly Rosales OTR/L Occupational Therapist                                Time Calculation:   OT Start Time: 1300  Timed Charges  53879 - OT Manual Therapy Minutes: 53  Total Minutes  Timed Charges Total Minutes: 53   Total Minutes: 53     Therapy Charges for Today       Code Description Service Date Service Provider Modifiers Qty    [id number]  OT MANUAL THERAPY EA 15 MIN 9/25/2023 Sherly Lomas OTR/L GO 4                      ALMA Singh/RAMÓN  9/25/2023

## 2023-10-02 ENCOUNTER — HOSPITAL ENCOUNTER (OUTPATIENT)
Dept: OCCUPATIONAL THERAPY | Facility: HOSPITAL | Age: 48
Setting detail: THERAPIES SERIES
Discharge: HOME | End: 2023-10-02
Payer: COMMERCIAL

## 2023-10-02 DIAGNOSIS — C50.911 MALIGNANT NEOPLASM OF RIGHT FEMALE BREAST, UNSPECIFIED ESTROGEN RECEPTOR STATUS, UNSPECIFIED SITE OF BREAST: Primary | ICD-10-CM

## 2023-10-02 DIAGNOSIS — L90.5 ADHERENT SCAR, SKIN: ICD-10-CM

## 2023-10-02 DIAGNOSIS — M25.60 RANGE OF MOTION DEFICIT: ICD-10-CM

## 2023-10-02 PROCEDURE — 97140 MANUAL THERAPY 1/> REGIONS: CPT

## 2023-10-02 NOTE — THERAPY PROGRESS REPORT/RE-CERT
Outpatient Occupational Therapy Ortho Treatment Note   Tristen     Patient Name: Celsa Velez  : 1975  MRN: [id number]  Today's Date: 10/2/2023        Visit Date: 10/02/2023    Patient Active Problem List   Diagnosis    IBS (irritable bowel syndrome)    Vitamin D deficiency    Hematuria    Family history of breast cancer in mother    Malignant neoplasm of right breast in female, estrogen receptor positive    Malignant neoplasm of upper-inner quadrant of right female breast        Past Medical History:   Diagnosis Date    Acute sinusitis     Allergic     Arthritis     Headache     Just sporadic    History of radiation therapy 2022    right chestwall/supraclav    JRA (juvenile rheumatoid arthritis)     Malignant neoplasm of right breast in female, estrogen receptor positive 2021    Wild Breast Mastectomies on 22    Seizure disorder     Possibly puberty/hormonal related; has not had a seizure since her teens    UTI (urinary tract infection)     Last Impression: 2014  Send urine for culture. Treat with cipro until results are  available.  Almaz Cavazos (Internal Medicine)        Past Surgical History:   Procedure Laterality Date    BREAST BIOPSY Right     us bx    BREAST RECONSTRUCTION, BREAST TISSUE EXPANDER INSERTION Bilateral 2022    Procedure: IMMEDIATE BILATERAL BREAST RECONSTRUCTION WITH TISSUE EXPANDER;  Surgeon: Tim Brown MD;  Location: [facility] OR;  Service: Plastics;  Laterality: Bilateral;    COLONOSCOPY      DILATION AND CURETTAGE, DIAGNOSTIC / THERAPEUTIC      Dilation And Curettage Of Cervical Stump    KNEE SURGERY      LYMPH NODE BIOPSY  Right axillary BC positive 5 nodes    MASTECTOMY      MASTECTOMY W/ SENTINEL NODE BIOPSY N/A 2022    Procedure: BREAST MASTECTOMY WITH RIGHT SENTINEL NODE BIOPSY, LEFT PROPHYLATIC SKIN SPARRING MASTECTOMY;  Surgeon: Claudia Barahona MD;  Location:  [facility];  Service: General;  Laterality: N/A;    US  "GUIDED LYMPH NODE BIOPSY  12/13/2021         Visit Dx:    ICD-10-CM ICD-9-CM   1. Malignant neoplasm of right female breast, unspecified estrogen receptor status, unspecified site of breast  C50.911 174.9   2. Adherent scar, skin  L90.5 709.2   3. Range of motion deficit  M25.60 719.50              Lymphedema       Row Name 10/02/23 1400             Subjective Pain    Able to rate subjective pain? yes  -SG      Pre-Treatment Pain Level 0  -SG      Post-Treatment Pain Level 0  -SG         Subjective    Subjective Comments Pt reports that she continues to notice guarding with her right UE and the \"tremors\" occassionally when she is sitting and not doing anything. She reports that she will continue to work on all of the stretches and exercises, continue w/ her , and all exercise classes she is doing.  -SG         Lymphedema Assessment    Lymphedema Classification RUE:;at risk/stage 0  -SG      Lymphedema Cancer Related Sx right;axillary dissection;bilateral;simple mastectomy;reconstructive  -SG      Lymphedema Surgery Comments 2/8/2022  -SG      Lymph Nodes Removed # 5  -SG      Positive Lymph Nodes # 5  -SG      Chemo Received yes  -SG      Radiation Therapy Received yes  -SG      Infections or Cellulitis? no  -SG         Posture/Observations    Posture- WNL Posture is WNL  -SG         General ROM    GENERAL ROM COMMENTS BUE WFL  -SG         MMT (Manual Muscle Testing)    General MMT Comments Pt participates in strength training 1 x week in addition to swimming and pilates.  -SG         Lymphedema Edema Assessment    Edema Assessment Comment Pt does not demo any edema at this time  -SG         Skin Changes/Observations    Location/Assessment Upper Extremity;Upper Quadrant  -SG      Upper Extremity Conditions bilateral:;normal  -SG      Upper Extremity Color/Pigment bilateral:;normal  -SG      Skin Observations Comment Tight soft tissue restrictions/scar tissue bilateral chest  -SG         Lymphedema " Sensation    Lymphedema Sensation Comments Pt reports tingling in hands and feet  -SG         Manual Lymphatic Drainage    Manual Lymphatic Drainage astym  -SG      Astym UE sequence;anterior-lateral chest  -SG      Anterior-Lateral Chest right  -SG      UE Sequence right  -SG      Manual Lymphatic Drainage Comments astym to left chest where port used to be  -SG      Manual Therapy See manual rx for PORi protocol  -SG         L-Dex Bioimpedence Screening    L-Dex Measurement Extremity RUE  -SG      L-Dex Patient Position Standing  -SG      L-Dex UE Dominate Side Right  -SG      L-Dex UE At Risk Side Right  -SG      L-Dex UE Pre Surgical Value No  -SG      L-Dex UE Baseline Score -2.4  -SG      Skeletal Muscle Mass (%) 29.7 %  -SG      Fat Mass (%) 30.5 %  -SG      Hy-dex -12.4  -SG                User Key  (r) = Recorded By, (t) = Taken By, (c) = Cosigned By      Initials Name Provider Type    Sherly Rosales, OTR/L Occupational Therapist                      Therapy Education  Education Details: Continue HEP and all exercising  Given: HEP, Symptoms/condition management, Posture/body mechanics  Program: Reinforced  How Provided: Verbal  Provided to: Patient  Level of Understanding: Verbalized     OT Assessment/Plan       Row Name 10/02/23 1400          OT Assessment    Functional Limitations Limitations in functional capacity and performance;Performance in self-care ADL  -SG     Impairments Range of motion;Posture;Pain;Muscle strength;Joint mobility;Impaired flexibility  -SG     OT Diagnosis BrCA  -SG     OT Rehab Potential Excellent  -SG     Patient/caregiver participated in establishment of treatment plan and goals Yes  -SG     Patient would benefit from skilled therapy intervention Yes  -SG        OT Plan    OT Frequency 1x/week  -SG     Planned CPT's? OT EVAL MOD COMPLEXITY: 31743;OT THER ACT EA 15 MIN: 90666AB;OT THER PROC EA 15 MIN: 65182YH;OT SELF CARE/MGMT/TRAIN 15 MIN: 79633;OT MANUAL THERAPY EA 15 MIN:  12937;OT BIS XTRACELL FLUID ANALYSIS: 61183  -     Planned Therapy Interventions (Optional Details) home exercise program;joint mobilization;manual therapy techniques;patient/family education;postural re-education;ROM (Range of Motion);strengthening;stretching  -     OT Plan Comments Continue PORi protcol w/ ASTYM/STM as needed; progress ROM and HEP as tolerated.  -               User Key  (r) = Recorded By, (t) = Taken By, (c) = Cosigned By      Initials Name Provider Type    Sherly Rosales OTR/L Occupational Therapist                             Manual Rx (last 36 hours)       Manual Treatments       Row Name 10/02/23 1400 10/02/23 1300          Total Minutes    59605 - OT Manual Therapy Minutes 45  -SG --        Manual Rx 1    Manual Rx 1 Location -- Arm--Orthopedic: Forearm extensors, biceps muscles, lateral intermuscular septum tension line upper arm, pectoralis major  -     Manual Rx 1 Type -- Trigger Point Release, Myofascial Bending  -     Manual Rx 1 Duration -- Trigger Point Release for count of 3 seconds, repeat each section 5 times; Myofascial stretching w/ tissue/muscle bending 3-5 times  -        Manual Rx 2    Manual Rx 2 Location -- Axilla--Orthopedic: Medial Intermuscular Septum tension line upper arm, pectoralis minor, subscapularis, latissimus dorsi, teres major  -     Manual Rx 2 Type -- Trigger Point Release; Myofascial bending  -     Manual Rx 2 Duration -- Trigger Point Release for count of 3 seconds, repeat each section 5 times; Myofascial stretching w/ tissue/muscle bending 3-5 times  -        Manual Rx 3    Manual Rx 3 Location -- Upper Arm: Lymph System  -     Manual Rx 3 Type -- Upper arm fluid clearance of axillary pathway; medial confluence fluid clearance--antecubital fossa; lateral upper arm-cephalic pathway  -     Manual Rx 3 Duration -- x1 line of treatment  -        Manual Rx 4    Manual Rx 4 Location -- Forearm--Orthopedic: radius and ulna-interosseous  membrane; carpals-joint capsules-metacarpals-interosseous membranes; thenar muscles  -     Manual Rx 4 Type -- Joint mobilization, Trigger Point Release, Drainage of forearm  -     Manual Rx 4 Duration -- Joint Mob: each section x5 in each position; drainage of forearm: perform x1 lines of treatment  -        Manual Rx 5    Manual Rx 5 Location -- Back--orthopedic: upper trapezius, infraspinatus, teres minor, rhomboids, quadratus lumborum; T12, L1, L2 mobilization  -     Manual Rx 5 Type -- Trigger Point Release, Joint mobilization  -     Manual Rx 5 Duration -- Trigger Point Release for count of 3 seconds, repeat each section 5 times; perform 5-10 oscillations of each vertebra successively  -        Manual Rx 6    Manual Rx 6 Location -- Back--lymph system  -     Manual Rx 6 Type -- Fluid movement; paraspinal lymph node pumps  -     Manual Rx 6 Duration -- Repeat each line x1 and corresponding paraspinal node pumps x5; repeat each zone x1  -               User Key  (r) = Recorded By, (t) = Taken By, (c) = Cosigned By      Initials Name Provider Type     Sherly Lomas OTR/L Occupational Therapist                                Time Calculation:   OT Start Time: 1400  Timed Charges  66884 - OT Manual Therapy Minutes: 45  Total Minutes  Timed Charges Total Minutes: 45   Total Minutes: 45     Therapy Charges for Today       Code Description Service Date Service Provider Modifiers Qty    [id number]  OT MANUAL THERAPY EA 15 MIN 10/2/2023 Sherly Lomas OTR/L GO 3                      ALMA Singh/RAMÓN  10/2/2023

## 2024-02-09 ENCOUNTER — DOCUMENTATION (OUTPATIENT)
Dept: OCCUPATIONAL THERAPY | Facility: HOSPITAL | Age: 49
End: 2024-02-09
Payer: COMMERCIAL

## 2024-02-09 DIAGNOSIS — L90.5 ADHERENT SCAR, SKIN: ICD-10-CM

## 2024-02-09 DIAGNOSIS — M25.60 RANGE OF MOTION DEFICIT: ICD-10-CM

## 2024-02-09 DIAGNOSIS — C50.911 MALIGNANT NEOPLASM OF RIGHT FEMALE BREAST, UNSPECIFIED ESTROGEN RECEPTOR STATUS, UNSPECIFIED SITE OF BREAST: Primary | ICD-10-CM

## 2024-10-15 ENCOUNTER — PATIENT OUTREACH (OUTPATIENT)
Dept: ONCOLOGY | Facility: CLINIC | Age: 49
End: 2024-10-15
Payer: COMMERCIAL

## 2024-10-15 NOTE — PROGRESS NOTES
Patient called to say that she has palpated an area near her right pectoralis muscle. 2022 she underwent bilateral mastectomies. 5/5 right axillary lymph nodes were positive- chemotherapy - radiation and reconstruction with tissue expanders - She will see Dr GARCIA in his Office Thursday 10/17/2024 @ 9005 I gave her directions to [address] [place] [address] 2nd floor. Patient verbalized understanding

## 2024-12-06 ENCOUNTER — OFFICE VISIT (OUTPATIENT)
Dept: INTERNAL MEDICINE | Facility: CLINIC | Age: 49
End: 2024-12-06
Payer: COMMERCIAL

## 2024-12-06 ENCOUNTER — LAB (OUTPATIENT)
Dept: LAB | Facility: HOSPITAL | Age: 49
End: 2024-12-06
Payer: COMMERCIAL

## 2024-12-06 VITALS
BODY MASS INDEX: 22.66 KG/M2 | TEMPERATURE: 97.3 F | OXYGEN SATURATION: 99 % | DIASTOLIC BLOOD PRESSURE: 60 MMHG | HEART RATE: 70 BPM | SYSTOLIC BLOOD PRESSURE: 112 MMHG | HEIGHT: 65 IN | WEIGHT: 136 LBS

## 2024-12-06 DIAGNOSIS — Z00.00 ROUTINE GENERAL MEDICAL EXAMINATION AT A HEALTH CARE FACILITY: ICD-10-CM

## 2024-12-06 DIAGNOSIS — Z00.00 ROUTINE GENERAL MEDICAL EXAMINATION AT A HEALTH CARE FACILITY: Primary | ICD-10-CM

## 2024-12-06 DIAGNOSIS — E55.9 VITAMIN D DEFICIENCY: ICD-10-CM

## 2024-12-06 DIAGNOSIS — Z78.0 POSTMENOPAUSAL: ICD-10-CM

## 2024-12-06 LAB
25(OH)D3 SERPL-MCNC: 34.8 NG/ML (ref 30–100)
ALBUMIN SERPL-MCNC: 4.5 G/DL (ref 3.5–5.2)
ALBUMIN/GLOB SERPL: 1.5 G/DL
ALP SERPL-CCNC: 63 U/L (ref 39–117)
ALT SERPL W P-5'-P-CCNC: 13 U/L (ref 1–33)
ANION GAP SERPL CALCULATED.3IONS-SCNC: 11.5 MMOL/L (ref 5–15)
AST SERPL-CCNC: 26 U/L (ref 1–32)
BILIRUB BLD-MCNC: NEGATIVE MG/DL
BILIRUB SERPL-MCNC: 0.3 MG/DL (ref 0–1.2)
BUN SERPL-MCNC: 18 MG/DL (ref 6–20)
BUN/CREAT SERPL: 19.6 (ref 7–25)
CALCIUM SPEC-SCNC: 9.6 MG/DL (ref 8.6–10.5)
CHLORIDE SERPL-SCNC: 103 MMOL/L (ref 98–107)
CHOLEST SERPL-MCNC: 175 MG/DL (ref 0–200)
CLARITY, POC: CLEAR
CO2 SERPL-SCNC: 25.5 MMOL/L (ref 22–29)
COLOR UR: YELLOW
CREAT SERPL-MCNC: 0.92 MG/DL (ref 0.57–1)
EGFRCR SERPLBLD CKD-EPI 2021: 76 ML/MIN/1.73
EXPIRATION DATE: ABNORMAL
GLOBULIN UR ELPH-MCNC: 3 GM/DL
GLUCOSE SERPL-MCNC: 77 MG/DL (ref 65–99)
GLUCOSE UR STRIP-MCNC: NEGATIVE MG/DL
HBA1C MFR BLD: 4.9 % (ref 4.8–5.6)
HDLC SERPL-MCNC: 102 MG/DL (ref 40–60)
KETONES UR QL: ABNORMAL
LDLC SERPL CALC-MCNC: 62 MG/DL (ref 0–100)
LDLC/HDLC SERPL: 0.61 {RATIO}
LEUKOCYTE EST, POC: NEGATIVE
Lab: ABNORMAL
NITRITE UR-MCNC: NEGATIVE MG/ML
PH UR: 6 [PH] (ref 5–8)
POTASSIUM SERPL-SCNC: 4.4 MMOL/L (ref 3.5–5.2)
PROT SERPL-MCNC: 7.5 G/DL (ref 6–8.5)
PROT UR STRIP-MCNC: NEGATIVE MG/DL
RBC # UR STRIP: ABNORMAL /UL
SODIUM SERPL-SCNC: 140 MMOL/L (ref 136–145)
SP GR UR: 1.03 (ref 1–1.03)
TRIGL SERPL-MCNC: 54 MG/DL (ref 0–150)
TSH SERPL DL<=0.05 MIU/L-ACNC: 1.91 UIU/ML (ref 0.27–4.2)
UROBILINOGEN UR QL: NORMAL
VIT B12 BLD-MCNC: 404 PG/ML (ref 211–946)
VLDLC SERPL-MCNC: 11 MG/DL (ref 5–40)

## 2024-12-06 PROCEDURE — 83036 HEMOGLOBIN GLYCOSYLATED A1C: CPT

## 2024-12-06 PROCEDURE — 80061 LIPID PANEL: CPT

## 2024-12-06 PROCEDURE — 81003 URINALYSIS AUTO W/O SCOPE: CPT | Performed by: INTERNAL MEDICINE

## 2024-12-06 PROCEDURE — 82306 VITAMIN D 25 HYDROXY: CPT

## 2024-12-06 PROCEDURE — 82607 VITAMIN B-12: CPT

## 2024-12-06 PROCEDURE — 80050 GENERAL HEALTH PANEL: CPT

## 2024-12-06 PROCEDURE — 99396 PREV VISIT EST AGE 40-64: CPT | Performed by: INTERNAL MEDICINE

## 2024-12-06 NOTE — PROGRESS NOTES
Chief Complaint   Patient presents with    Annual Exam    Weight Gain       History of Present Illness  HM, Adult Female:  The patient is being seen for a health maintenance evaluation.   Social history:  to David with Children, 2 sons 16 yo and 14 yo. Occupation: .  General Health: The patient's health is described as good. She has regular dental visits. She denies vision problems. She denies hearing loss. Immunizations status: up to date.   Lifestyle:. She consumes a diverse and healthy diet. She does not have any weight concerns. She exercises regularly.Swims regularly and has had Yoga sessions at work.She does not use tobacco. She denies alcohol use. She denies drug use.   Reproductive health:. She reports normal menses. S/p Tubal.[facility] and helped her significantly.  Screening: Cancer screening reviewed and current.   Metabolic screening reviewed and current.   Risk screening reviewed and current. Mom had DCIS( late 60s- 70) and , maternal grandmother had Recurrent breast cancer( came back twice)    HPI  Trying pilates, started yesterday. Doing well overall.    Review of Systems   Constitutional:  Negative for chills and fatigue.   HENT:  Negative for congestion, ear pain and sore throat.    Eyes:  Negative for pain, redness and visual disturbance.   Respiratory:  Negative for cough and shortness of breath.    Cardiovascular:  Negative for chest pain, palpitations and leg swelling.   Gastrointestinal:  Negative for abdominal pain, diarrhea and nausea.   Endocrine: Negative for cold intolerance and heat intolerance.   Genitourinary:  Negative for flank pain and urgency.   Musculoskeletal:  Negative for arthralgias and gait problem.   Skin:  Negative for pallor and rash.   Neurological:  Negative for dizziness, weakness and headaches.   Psychiatric/Behavioral:  Negative for dysphoric mood and sleep disturbance. The patient is not nervous/anxious.   "      Patient Active Problem List   Diagnosis    IBS (irritable bowel syndrome)    Vitamin D deficiency    Hematuria    Family history of breast cancer in mother    Malignant neoplasm of right breast in female, estrogen receptor positive    Malignant neoplasm of upper-inner quadrant of right female breast       Social History     Socioeconomic History    Marital status:    Tobacco Use    Smoking status: Never     Passive exposure: Past    Smokeless tobacco: Never    Tobacco comments:     Mother smoked for years.   Vaping Use    Vaping status: Never Used   Substance and Sexual Activity    Alcohol use: Yes     Alcohol/week: 4.0 standard drinks of alcohol     Types: 4 Drinks containing 0.5 oz of alcohol per week     Comment: Occasionally    Drug use: No    Sexual activity: Yes     Partners: Male     Birth control/protection: Surgical       Current Outpatient Medications on File Prior to Visit   Medication Sig Dispense Refill    Ascorbic Acid (Vitamin C) 500 MG chewable tablet Chew 2 each.      Bacillus Coagulans-Inulin (PROBIOTIC-PREBIOTIC PO) Take  by mouth.      Cholecalciferol (VITAMIN D-3 PO) Take 2,000 each by mouth.      levocetirizine (XYZAL) 5 MG tablet Take 1 tablet by mouth Every Evening.      tamoxifen (NOLVADEX) 20 MG chemo tablet 1 tablet Daily.       No current facility-administered medications on file prior to visit.       Allergies   Allergen Reactions    Hydrocodone Nausea And Vomiting     Pt states she is unsure if reaction was due to waking up from anesthesia or from the pain med    Penicillins Hives       /60   Pulse 70   Temp 97.3 °F (36.3 °C)   Ht 165 cm (64.96\")   Wt 61.7 kg (136 lb)   LMP 01/19/2022 (Exact Date)   SpO2 99% Comment: ra  BMI 22.66 kg/m²            The following portions of the patient's history were reviewed and updated as appropriate: allergies, current medications, past family history, past medical history, past social history, past surgical history, and " problem list.    Physical Exam  Constitutional:       General: She is not in acute distress.     Appearance: Normal appearance.   HENT:      Head: Normocephalic and atraumatic.      Right Ear: Tympanic membrane and external ear normal.      Left Ear: Tympanic membrane and external ear normal.      Nose: Nose normal.      Mouth/Throat:      Mouth: Mucous membranes are moist.   Eyes:      General: No scleral icterus.  Neck:      Vascular: No carotid bruit.   Cardiovascular:      Rate and Rhythm: Normal rate and regular rhythm.      Pulses: Normal pulses.      Heart sounds: Normal heart sounds. No murmur heard.     No friction rub. No gallop.   Pulmonary:      Effort: Pulmonary effort is normal.      Breath sounds: Normal breath sounds. No rhonchi or rales.   Abdominal:      General: Bowel sounds are normal. There is no distension.      Palpations: Abdomen is soft.      Tenderness: There is no right CVA tenderness, left CVA tenderness, guarding or rebound.      Hernia: No hernia is present.   Musculoskeletal:         General: No tenderness. Normal range of motion.      Cervical back: Normal range of motion.      Right lower leg: No edema.      Left lower leg: No edema.   Lymphadenopathy:      Cervical: No cervical adenopathy.   Skin:     General: Skin is warm.      Findings: No rash.   Neurological:      General: No focal deficit present.      Mental Status: She is alert and oriented to person, place, and time. Mental status is at baseline.      Cranial Nerves: No cranial nerve deficit.      Sensory: No sensory deficit.      Coordination: Coordination normal.      Gait: Gait normal.      Deep Tendon Reflexes: Reflexes normal.      Comments: Balance wnl   Psychiatric:         Mood and Affect: Mood normal.         Behavior: Behavior normal.       BMI is within normal parameters. No other follow-up for BMI required.       Results for orders placed or performed in visit on 12/06/24   POCT urinalysis dipstick, automated     Collection Time: 12/06/24  1:38 PM    Specimen: Urine   Result Value Ref Range    Color Yellow Yellow, Straw, Dark Yellow, Clarissa    Clarity, UA Clear Clear    Specific Gravity  1.030 1.005 - 1.030    pH, Urine 6.0 5.0 - 8.0    Leukocytes Negative Negative    Nitrite, UA Negative Negative    Protein, POC Negative Negative mg/dL    Glucose, UA Negative Negative mg/dL    Ketones, UA 40 mg/dL (A) Negative    Urobilinogen, UA Normal Normal, 0.2 E.U./dL    Bilirubin Negative Negative    Blood, UA Trace (A) Negative    Lot Number [id number]     Expiration Date 6/4/26        Diagnoses and all orders for this visit:    1. Routine general medical examination at a health care facility (Primary)  -     POCT urinalysis dipstick, automated  -     CBC (No Diff); Future  -     Comprehensive Metabolic Panel; Future  -     Lipid Panel; Future  -     Hemoglobin A1c; Future  -     TSH Rfx On Abnormal To Free T4; Future  -     Vitamin B12; Future    2. Vitamin D deficiency  -     Vitamin D,25-Hydroxy; Future    3. Postmenopausal  -     DEXA Bone Density Axial; Future          Health Maintenance   Topic Date Due    ZOSTER VACCINE (1 of 2) 12/06/2024 (Originally 10/[birth year]/1993)    INFLUENZA VACCINE  03/31/2025 (Originally 7/1/2024)    COVID-19 Vaccine (3 - Moderna risk series) 12/05/2025 (Originally 3/19/2021)    Pneumococcal Vaccine 0-64 (1 of 2 - PCV) 12/06/2025 (Originally 10/3/1980)    ANNUAL PHYSICAL  12/06/2025    COLORECTAL CANCER SCREENING  02/16/2026    PAP SMEAR  08/07/2026    TDAP/TD VACCINES (2 - Td or Tdap) 10/03/2026    HEPATITIS C SCREENING  Completed    MAMMOGRAM  Discontinued       Discussion/Summary  Impression: health maintenance visit. Currently, she eats an adequate diet and has an adequate exercise regimen.   Cervical cancer screening:Pap smear is current.   Breast cancer screening: mammogram is current.   Colorectal cancer screening: colonoscopy is current.  Osteoporosis screening: Bone mineral density test is  due-ordered.   CT low dose screen -not indicated  Screening lab work includes hemoglobin, glucose, lipid profile, thyroid function testing, 25-hydroxyvitamin D and urinalysis.   Immunizations are needed, immunizations will be given as outlined in the orders   Advice and education were given regarding cardiovascular risk reduction, healthy dietary habits, Seatbelt and helmet use and self skin examination.     No follow-ups on file.      Electronically signed by:    Yecenia Giang MD

## 2024-12-07 LAB
DEPRECATED RDW RBC AUTO: 40.2 FL (ref 37–54)
ERYTHROCYTE [DISTWIDTH] IN BLOOD BY AUTOMATED COUNT: 12.4 % (ref 12.3–15.4)
HCT VFR BLD AUTO: 38.8 % (ref 34–46.6)
HGB BLD-MCNC: 13.3 G/DL (ref 12–15.9)
MCH RBC QN AUTO: 31.1 PG (ref 26.6–33)
MCHC RBC AUTO-ENTMCNC: 34.3 G/DL (ref 31.5–35.7)
MCV RBC AUTO: 90.7 FL (ref 79–97)
PLATELET # BLD AUTO: 199 10*3/MM3 (ref 140–450)
PMV BLD AUTO: 11.1 FL (ref 6–12)
RBC # BLD AUTO: 4.28 10*6/MM3 (ref 3.77–5.28)
WBC NRBC COR # BLD AUTO: 5.64 10*3/MM3 (ref 3.4–10.8)

## 2024-12-19 ENCOUNTER — HOSPITAL ENCOUNTER (OUTPATIENT)
Dept: BONE DENSITY | Facility: HOSPITAL | Age: 49
Discharge: HOME | End: 2024-12-19
Admitting: INTERNAL MEDICINE
Payer: COMMERCIAL

## 2024-12-19 DIAGNOSIS — Z78.0 POSTMENOPAUSAL: ICD-10-CM

## 2024-12-19 PROCEDURE — 77080 DXA BONE DENSITY AXIAL: CPT

## (undated) DEVICE — SUT SILK 2/0 SH CR8 18IN CR8 C012D

## (undated) DEVICE — SYRINGE, LUER LOCK, 60ML: Brand: MEDLINE

## (undated) DEVICE — BOWL UTIL STRL 32OZ

## (undated) DEVICE — BLANKT WARM LOWR/BDY 100X120CM

## (undated) DEVICE — CVR PROB ULTRASND/TRANSD W/GEL LNG 18X250CM STRL

## (undated) DEVICE — TP PLSTC CLR TRNSPORE 1IN SURG

## (undated) DEVICE — SOL IRR H2O BTL 1000ML STRL

## (undated) DEVICE — ANTIBACTERIAL UNDYED BRAIDED (POLYGLACTIN 910), SYNTHETIC ABSORBABLE SUTURE: Brand: COATED VICRYL

## (undated) DEVICE — SUT MNCRYL 3/0 SH 27IN UD MCP416H

## (undated) DEVICE — GLV SURG SENSICARE W/ALOE PF LF 7.5 STRL

## (undated) DEVICE — TBG PENCL TELESCP MEGADYNE SMOKE EVAC 10FT

## (undated) DEVICE — TOTAL TRAY, 16FR 10ML SIL FOLEY, URN: Brand: MEDLINE

## (undated) DEVICE — 3M™ IOBAN™ 2 ANTIMICROBIAL INCISE DRAPE 6650EZ: Brand: IOBAN™ 2

## (undated) DEVICE — BANDAGE,GAUZE,BULKEE II,4.5"X4.1YD,STRL: Brand: MEDLINE

## (undated) DEVICE — SUT ETHLN 3/0 PC5 18IN 1893G

## (undated) DEVICE — SUT MNCRYL PLS ANTIB UD 3/0 PS2 27IN

## (undated) DEVICE — SUT SILK 2/0 PS 18IN 1588H

## (undated) DEVICE — JACKSON-PRATT 100CC BULB RESERVOIR: Brand: CARDINAL HEALTH

## (undated) DEVICE — SPNG LAP PREWSH SFTPK 18X18IN STRL PK/5

## (undated) DEVICE — DRSNG SURESITE WNDW 2.38X2.75

## (undated) DEVICE — PATIENT RETURN ELECTRODE, SINGLE-USE, CONTACT QUALITY MONITORING, ADULT, WITH 9FT CORD, FOR PATIENTS WEIGING OVER 33LBS. (15KG): Brand: MEGADYNE

## (undated) DEVICE — TOWEL,OR,DSP,ST,BLUE,STD,4/PK,20PK/CS: Brand: MEDLINE

## (undated) DEVICE — PK CHST BRST 10

## (undated) DEVICE — TRY SKINPREP DRYPREP

## (undated) DEVICE — BIOPATCH™ ANTIMICROBIAL DRESSING WITH CHLORHEXIDINE GLUCONATE IS A HYDROPHILLIC POLYURETHANE ABSORPTIVE FOAM WITH CHLORHEXIDINE GLUCONATE (CHG) WHICH INHIBITS BACTERIAL GROWTH UNDER THE DRESSING. THE DRESSING IS INTENDED TO BE USED TO ABSORB EXUDATE, COVER A WOUND CAUSED BY VASCULAR AND NONVASCULAR PERCUTANEOUS MEDICAL DEVICES DURING SURGERY, AS WELL AS REDUCE LOCAL INFECTION AND COLONIZATION OF MICROORGANISMS.: Brand: BIOPATCH

## (undated) DEVICE — STERILE PVP: Brand: MEDLINE INDUSTRIES, INC.

## (undated) DEVICE — GLV SURG SENSICARE PI MIC PF SZ7.5 LF STRL

## (undated) DEVICE — TRAP FLD MINIVAC MEGADYNE 100ML

## (undated) DEVICE — LEX GENERAL BREAST: Brand: MEDLINE INDUSTRIES, INC.

## (undated) DEVICE — SUT ETHLN 2/0 PS 18IN 585H

## (undated) DEVICE — BNDG ELAS W/CLIP 6IN 10YD LF STRL

## (undated) DEVICE — SUT SILK 3/0 TIES 18IN A184H

## (undated) DEVICE — APPL CHLORAPREP W/TINT 26ML BLU

## (undated) DEVICE — SWABSTK SKINPREP PVPI PRE/SAT 8IN STRL

## (undated) DEVICE — SUT MONOCRYL PLS ANTIB UND 3/0  PS1 27IN

## (undated) DEVICE — UNDERGLV SURG BIOGEL INDICAT PI SZ8 BLU

## (undated) DEVICE — BLAKE SILICONE DRAIN, 15 FR ROUND, HUBLESS WITH 3/16" TROCAR: Brand: BLAKE

## (undated) DEVICE — PREVENA DUO PEEL & PLACE SYSTEM KIT- 13 CM: Brand: PREVENA DUO™ PEEL & PLACE™

## (undated) DEVICE — DISH PETRI 3.5IN MD STRL LF

## (undated) DEVICE — GOWN,NON-REINFORCED,SIRUS,SET IN SLV,XL: Brand: MEDLINE

## (undated) DEVICE — GLV SURG SENSICARE PI ORTHO SZ7.5 LF STRL

## (undated) DEVICE — ELECTRD BLD EZ CLN MOD XLNG 2.75IN

## (undated) DEVICE — PROXIMATE RH ROTATING HEAD SKIN STAPLERS (35 WIDE) CONTAINS 35 STAINLESS STEEL STAPLES: Brand: PROXIMATE

## (undated) DEVICE — SOL IRR NACL 0.9PCT BT 1000ML